# Patient Record
Sex: MALE | Race: OTHER | HISPANIC OR LATINO | URBAN - METROPOLITAN AREA
[De-identification: names, ages, dates, MRNs, and addresses within clinical notes are randomized per-mention and may not be internally consistent; named-entity substitution may affect disease eponyms.]

---

## 2016-12-23 RX ORDER — GENTAMICIN SULFATE 40 MG/ML
100 VIAL (ML) INJECTION
Qty: 0 | Refills: 0 | COMMUNITY
Start: 2016-12-23

## 2017-01-02 ENCOUNTER — EMERGENCY (EMERGENCY)
Facility: HOSPITAL | Age: 37
LOS: 1 days | Discharge: HOME | End: 2017-01-02
Attending: EMERGENCY MEDICINE | Admitting: EMERGENCY MEDICINE
Payer: MEDICARE

## 2017-01-02 VITALS
OXYGEN SATURATION: 97 % | TEMPERATURE: 99 F | RESPIRATION RATE: 18 BRPM | HEART RATE: 60 BPM | DIASTOLIC BLOOD PRESSURE: 88 MMHG | SYSTOLIC BLOOD PRESSURE: 208 MMHG

## 2017-01-02 VITALS
RESPIRATION RATE: 20 BRPM | OXYGEN SATURATION: 96 % | DIASTOLIC BLOOD PRESSURE: 76 MMHG | SYSTOLIC BLOOD PRESSURE: 179 MMHG | TEMPERATURE: 98 F | HEART RATE: 71 BPM

## 2017-01-02 DIAGNOSIS — E78.5 HYPERLIPIDEMIA, UNSPECIFIED: ICD-10-CM

## 2017-01-02 DIAGNOSIS — Z79.891 LONG TERM (CURRENT) USE OF OPIATE ANALGESIC: ICD-10-CM

## 2017-01-02 DIAGNOSIS — R06.02 SHORTNESS OF BREATH: ICD-10-CM

## 2017-01-02 DIAGNOSIS — N18.6 END STAGE RENAL DISEASE: ICD-10-CM

## 2017-01-02 DIAGNOSIS — I12.0 HYPERTENSIVE CHRONIC KIDNEY DISEASE WITH STAGE 5 CHRONIC KIDNEY DISEASE OR END STAGE RENAL DISEASE: ICD-10-CM

## 2017-01-02 DIAGNOSIS — Z91.013 ALLERGY TO SEAFOOD: ICD-10-CM

## 2017-01-02 DIAGNOSIS — Z88.5 ALLERGY STATUS TO NARCOTIC AGENT: ICD-10-CM

## 2017-01-02 DIAGNOSIS — I77.0 ARTERIOVENOUS FISTULA, ACQUIRED: ICD-10-CM

## 2017-01-02 DIAGNOSIS — Z88.8 ALLERGY STATUS TO OTHER DRUGS, MEDICAMENTS AND BIOLOGICAL SUBSTANCES STATUS: ICD-10-CM

## 2017-01-02 DIAGNOSIS — Z79.82 LONG TERM (CURRENT) USE OF ASPIRIN: ICD-10-CM

## 2017-01-02 DIAGNOSIS — Z98.89 OTHER SPECIFIED POSTPROCEDURAL STATES: Chronic | ICD-10-CM

## 2017-01-02 DIAGNOSIS — Z79.899 OTHER LONG TERM (CURRENT) DRUG THERAPY: ICD-10-CM

## 2017-01-02 LAB
ALBUMIN SERPL ELPH-MCNC: 2.8 G/DL — LOW (ref 3.4–5)
ALP SERPL-CCNC: 56 U/L — SIGNIFICANT CHANGE UP (ref 40–120)
ALT FLD-CCNC: 14 U/L — SIGNIFICANT CHANGE UP (ref 12–42)
ANION GAP SERPL CALC-SCNC: 10 MMOL/L — SIGNIFICANT CHANGE UP (ref 9–16)
APTT BLD: 55.8 SEC — HIGH (ref 27.5–37.4)
AST SERPL-CCNC: 23 U/L — SIGNIFICANT CHANGE UP (ref 15–37)
BASOPHILS NFR BLD AUTO: 0.9 % — SIGNIFICANT CHANGE UP (ref 0–2)
BILIRUB SERPL-MCNC: 1.3 MG/DL — HIGH (ref 0.2–1.2)
BUN SERPL-MCNC: 41 MG/DL — HIGH (ref 7–23)
CALCIUM SERPL-MCNC: 8.3 MG/DL — LOW (ref 8.5–10.5)
CHLORIDE SERPL-SCNC: 97 MMOL/L — SIGNIFICANT CHANGE UP (ref 96–108)
CK MB CFR SERPL CALC: <1 NG/ML — SIGNIFICANT CHANGE UP (ref 0.5–3.6)
CO2 SERPL-SCNC: 29 MMOL/L — SIGNIFICANT CHANGE UP (ref 22–31)
CREAT SERPL-MCNC: 9.49 MG/DL — HIGH (ref 0.5–1.3)
EOSINOPHIL NFR BLD AUTO: 8.5 % — HIGH (ref 0–6)
GLUCOSE SERPL-MCNC: 91 MG/DL — SIGNIFICANT CHANGE UP (ref 70–99)
HCT VFR BLD CALC: 28 % — LOW (ref 39–50)
HGB BLD-MCNC: 9.3 G/DL — LOW (ref 13–17)
INR BLD: 1.37 — HIGH (ref 0.88–1.16)
LYMPHOCYTES # BLD AUTO: 24.9 % — SIGNIFICANT CHANGE UP (ref 13–44)
MCHC RBC-ENTMCNC: 33.2 G/DL — SIGNIFICANT CHANGE UP (ref 32–36)
MCHC RBC-ENTMCNC: 33.6 PG — SIGNIFICANT CHANGE UP (ref 27–34)
MCV RBC AUTO: 101.1 FL — HIGH (ref 80–100)
MONOCYTES NFR BLD AUTO: 10.3 % — SIGNIFICANT CHANGE UP (ref 2–14)
NEUTROPHILS NFR BLD AUTO: 55.4 % — SIGNIFICANT CHANGE UP (ref 43–77)
NT-PROBNP SERPL-SCNC: HIGH PG/ML
PLATELET # BLD AUTO: 114 K/UL — LOW (ref 150–400)
POTASSIUM SERPL-MCNC: 4.1 MMOL/L — SIGNIFICANT CHANGE UP (ref 3.5–5.3)
POTASSIUM SERPL-SCNC: 4.1 MMOL/L — SIGNIFICANT CHANGE UP (ref 3.5–5.3)
PROT SERPL-MCNC: 7.9 G/DL — SIGNIFICANT CHANGE UP (ref 6.4–8.2)
PROTHROM AB SERPL-ACNC: 15.3 SEC — HIGH (ref 10–13.1)
RBC # BLD: 2.77 M/UL — LOW (ref 4.2–5.8)
RBC # FLD: 20.1 % — HIGH (ref 10.3–16.9)
SODIUM SERPL-SCNC: 136 MMOL/L — SIGNIFICANT CHANGE UP (ref 135–145)
TROPONIN I SERPL-MCNC: 0.06 NG/ML — HIGH (ref 0.01–0.04)
WBC # BLD: 3.3 K/UL — LOW (ref 3.8–10.5)
WBC # FLD AUTO: 3.3 K/UL — LOW (ref 3.8–10.5)

## 2017-01-02 PROCEDURE — 36415 COLL VENOUS BLD VENIPUNCTURE: CPT

## 2017-01-02 PROCEDURE — 85610 PROTHROMBIN TIME: CPT

## 2017-01-02 PROCEDURE — 85730 THROMBOPLASTIN TIME PARTIAL: CPT

## 2017-01-02 PROCEDURE — 93010 ELECTROCARDIOGRAM REPORT: CPT

## 2017-01-02 PROCEDURE — 85025 COMPLETE CBC W/AUTO DIFF WBC: CPT

## 2017-01-02 PROCEDURE — 87040 BLOOD CULTURE FOR BACTERIA: CPT

## 2017-01-02 PROCEDURE — 93005 ELECTROCARDIOGRAM TRACING: CPT | Mod: 77

## 2017-01-02 PROCEDURE — 80053 COMPREHEN METABOLIC PANEL: CPT

## 2017-01-02 PROCEDURE — 71010: CPT | Mod: 26

## 2017-01-02 PROCEDURE — 99284 EMERGENCY DEPT VISIT MOD MDM: CPT | Mod: 25

## 2017-01-02 PROCEDURE — 83880 ASSAY OF NATRIURETIC PEPTIDE: CPT

## 2017-01-02 PROCEDURE — 99285 EMERGENCY DEPT VISIT HI MDM: CPT | Mod: 25

## 2017-01-02 PROCEDURE — 82550 ASSAY OF CK (CPK): CPT

## 2017-01-02 PROCEDURE — 82553 CREATINE MB FRACTION: CPT

## 2017-01-02 PROCEDURE — 84484 ASSAY OF TROPONIN QUANT: CPT

## 2017-01-02 PROCEDURE — 71045 X-RAY EXAM CHEST 1 VIEW: CPT

## 2017-01-02 RX ORDER — SODIUM CHLORIDE 9 MG/ML
3 INJECTION INTRAMUSCULAR; INTRAVENOUS; SUBCUTANEOUS ONCE
Qty: 0 | Refills: 0 | Status: COMPLETED | OUTPATIENT
Start: 2017-01-02 | End: 2017-01-02

## 2017-01-02 RX ADMIN — SODIUM CHLORIDE 3 MILLILITER(S): 9 INJECTION INTRAMUSCULAR; INTRAVENOUS; SUBCUTANEOUS at 02:00

## 2017-01-02 NOTE — ED PROVIDER NOTE - CARE PLAN
Principal Discharge DX:	AV fistula  Secondary Diagnosis:	Chronic renal disease  Secondary Diagnosis:	SOB (shortness of breath)

## 2017-01-02 NOTE — ED PROVIDER NOTE - CONDUCTED A DETAILED DISCUSSION WITH PATIENT AND/OR GUARDIAN REGARDING, MDM
need for outpatient follow-up/lab results/return to ED if symptoms worsen, persist or questions arise/radiology results

## 2017-01-02 NOTE — ED PROVIDER NOTE - PSYCHIATRIC, MLM
Alert and oriented to person, place, time/situation. pt appears under influence of drugs - sleeping comfortable, when wakes up demanding narcotics

## 2017-01-02 NOTE — ED PROVIDER NOTE - ATTENDING CONTRIBUTION TO CARE
37 y/o M, BIBA c/o chronic SOB and L fistula infection. Pt had AV fistula revision on 12/23 by vascular and claims that for the past few days he has seen some redness and purulent dc. ?fevers. Also c/o chronic pain and has taken percocet for it but is asking for dilaudid now. Pt is AAO, NAD. RRR, CTA b/l, AV fistula site C/D/I.. Labs noted. Vascular consulted and saw pt in ED with no acute issues. Pt to be discharged home with outpt f/up.

## 2017-01-02 NOTE — ED ADULT NURSE NOTE - OBJECTIVE STATEMENT
Pt c/o if sob since yesterday. C/o chronic chest pain near old surgical site with breathing. Pt states his pain med doesn't give him any relief. Pt states he is due for Dialysis tomorrow and goes Monday, Wednesday, and Friday. O2 sat on RA is 97%. Pt has a left arm dialysis shunt and Right arm single lumen PICC line.

## 2017-01-02 NOTE — ED PROVIDER NOTE - MEDICAL DECISION MAKING DETAILS
pt claiming fistula inf -- dressing removed and no pus, no objective signs of inf - no pus/swelling/warmth, + sutures in place from recent revised fistula, seen by vasc and agree no acute inf; also claiming chronic sob and pain - pt appears to be under influence of drugs and admits to taking narcotics - requesting further pain meds - offered tyl or motrin which he refused, is in NAD. ekg unchanged from prior, labs baseline for pt (trop is less then pt's baseline - always high risk 2/2 to kidney failure and bnp elevated but in line w/prior value), no fluid overload on exam and cxr w/o failure, was hypertensive but bp decreased w/o meds and pt asymptomatic, no indication for admission at this time for all values stable and pt's baseline, behavior highly concerning for drug seeking, discussed w/pt's renal md and advised to d/c for outpatient dialysis, pt angry about not getting pain meds but not objecting to d/c plan

## 2017-01-02 NOTE — ED PROVIDER NOTE - MUSCULOSKELETAL, MLM
L UE: + av fistula in place w/revised fistula below it w/sutures in place, + thrill, no erythema, no pus, no bleeding, no tend, soft compartments

## 2017-01-02 NOTE — ED PROVIDER NOTE - PROGRESS NOTE DETAILS
spoke to dr kovacs who is covering for dr ledbetter -- all labs / findings discussed, and pt deemed stable to d/c for outpatient dialysis in am, f/u w/office; fistula jacquelyn'd by vascular - to f/u in clinic for suture removal and further f/u - agree that no inf

## 2017-01-02 NOTE — ED ADULT TRIAGE NOTE - CHIEF COMPLAINT QUOTE
pt BIBA coming from home ,is a dialysis pt , last had dialysis on Friday , pt c/o SOB since yesterday had chest yesterday but not today , pt received radioactive isotope GA67 2 weeks ago , [employer] sensor In the ambulance went off pt BIBA coming from home ,is a dialysis pt , last had dialysis on Friday , pt c/o SOB since yesterday had chest yesterday but not today , pt received radioactive isotope GA67 2 weeks ago , [employer] sensor In the ambulance went off, left AV fistula look infected

## 2017-01-02 NOTE — ED ADULT NURSE NOTE - CHIEF COMPLAINT QUOTE
pt BIBA coming from home ,is a dialysis pt , last had dialysis on Friday , pt c/o SOB since yesterday had chest yesterday but not today , pt received radioactive isotope GA67 2 weeks ago , [employer] sensor In the ambulance went off, left AV fistula look infected
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
## 2017-01-02 NOTE — ED PROVIDER NOTE - OBJECTIVE STATEMENT
The pt is a 37 y/o M, BIBA, c/o L fistula infection - had av fistula revised on 12/23 by dr macedo, claims that it's infected for few d. Claims purulent d/c and redness, also reports chronic sob - no acute changes today, HD on Mon - Wed - Fri, and demanding narcotics for his "pain" -- states chronic pain to L side of body, admits to taking percocet PTA. Denies fever, cp, cough, leg swelling, dizziness, syncope, n/v/d, abd pain

## 2017-01-07 LAB
CULTURE RESULTS: SIGNIFICANT CHANGE UP
CULTURE RESULTS: SIGNIFICANT CHANGE UP
SPECIMEN SOURCE: SIGNIFICANT CHANGE UP
SPECIMEN SOURCE: SIGNIFICANT CHANGE UP

## 2017-01-09 ENCOUNTER — RESULT REVIEW (OUTPATIENT)
Age: 37
End: 2017-01-09

## 2017-01-09 PROBLEM — Z09 SURGICAL FOLLOW-UP CARE: Status: ACTIVE | Noted: 2017-01-09

## 2017-01-09 PROBLEM — A49.01 MSSA (METHICILLIN SUSCEPTIBLE STAPHYLOCOCCUS AUREUS) INFECTION: Status: ACTIVE | Noted: 2017-01-09

## 2017-01-09 PROBLEM — Z98.890 S/P AORTIC DISSECTION REPAIR: Status: ACTIVE | Noted: 2017-01-09

## 2017-01-10 ENCOUNTER — APPOINTMENT (OUTPATIENT)
Dept: VASCULAR SURGERY | Facility: CLINIC | Age: 37
End: 2017-01-10

## 2017-01-10 ENCOUNTER — INPATIENT (INPATIENT)
Facility: HOSPITAL | Age: 37
LOS: 3 days | Discharge: HOME | DRG: 252 | End: 2017-01-14
Attending: SURGERY | Admitting: SURGERY
Payer: MEDICARE

## 2017-01-10 VITALS
OXYGEN SATURATION: 100 % | RESPIRATION RATE: 16 BRPM | SYSTOLIC BLOOD PRESSURE: 179 MMHG | DIASTOLIC BLOOD PRESSURE: 82 MMHG | TEMPERATURE: 98 F | HEIGHT: 71 IN | HEART RATE: 60 BPM | WEIGHT: 192.9 LBS
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
## 2017-01-10 DIAGNOSIS — N18.9 CHRONIC KIDNEY DISEASE, UNSPECIFIED: ICD-10-CM
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
## 2017-01-10 DIAGNOSIS — Z98.89 OTHER SPECIFIED POSTPROCEDURAL STATES: Chronic | ICD-10-CM

## 2017-01-10 LAB
ALBUMIN SERPL ELPH-MCNC: 2.9 G/DL — LOW (ref 3.4–5)
ALP SERPL-CCNC: 50 U/L — SIGNIFICANT CHANGE UP (ref 40–120)
ALT FLD-CCNC: 13 U/L — SIGNIFICANT CHANGE UP (ref 12–42)
ANION GAP SERPL CALC-SCNC: 10 MMOL/L — SIGNIFICANT CHANGE UP (ref 9–16)
APTT BLD: 54.9 SEC — HIGH (ref 27.5–37.4)
AST SERPL-CCNC: 21 U/L — SIGNIFICANT CHANGE UP (ref 15–37)
BASOPHILS NFR BLD AUTO: 1 % — SIGNIFICANT CHANGE UP (ref 0–2)
BILIRUB SERPL-MCNC: 1.2 MG/DL — SIGNIFICANT CHANGE UP (ref 0.2–1.2)
BLD GP AB SCN SERPL QL: NEGATIVE — SIGNIFICANT CHANGE UP
BUN SERPL-MCNC: 29 MG/DL — HIGH (ref 7–23)
CALCIUM SERPL-MCNC: 8.6 MG/DL — SIGNIFICANT CHANGE UP (ref 8.5–10.5)
CHLORIDE SERPL-SCNC: 100 MMOL/L — SIGNIFICANT CHANGE UP (ref 96–108)
CO2 SERPL-SCNC: 29 MMOL/L — SIGNIFICANT CHANGE UP (ref 22–31)
CREAT SERPL-MCNC: 6.6 MG/DL — HIGH (ref 0.5–1.3)
EOSINOPHIL NFR BLD AUTO: 6.1 % — HIGH (ref 0–6)
GLUCOSE SERPL-MCNC: 95 MG/DL — SIGNIFICANT CHANGE UP (ref 70–99)
HCT VFR BLD CALC: 28.9 % — LOW (ref 39–50)
HGB BLD-MCNC: 9.5 G/DL — LOW (ref 13–17)
INR BLD: 1.39 — HIGH (ref 0.88–1.16)
LYMPHOCYTES # BLD AUTO: 19.7 % — SIGNIFICANT CHANGE UP (ref 13–44)
MAGNESIUM SERPL-MCNC: 2.2 MG/DL — SIGNIFICANT CHANGE UP (ref 1.6–2.4)
MCHC RBC-ENTMCNC: 32.9 G/DL — SIGNIFICANT CHANGE UP (ref 32–36)
MCHC RBC-ENTMCNC: 33.5 PG — SIGNIFICANT CHANGE UP (ref 27–34)
MCV RBC AUTO: 101.8 FL — HIGH (ref 80–100)
MONOCYTES NFR BLD AUTO: 14.8 % — HIGH (ref 2–14)
NEUTROPHILS NFR BLD AUTO: 58.4 % — SIGNIFICANT CHANGE UP (ref 43–77)
NT-PROBNP SERPL-SCNC: HIGH PG/ML
PHOSPHATE SERPL-MCNC: 4.9 MG/DL — HIGH (ref 2.5–4.5)
PLATELET # BLD AUTO: 108 K/UL — LOW (ref 150–400)
POTASSIUM SERPL-MCNC: 4 MMOL/L — SIGNIFICANT CHANGE UP (ref 3.5–5.3)
POTASSIUM SERPL-SCNC: 4 MMOL/L — SIGNIFICANT CHANGE UP (ref 3.5–5.3)
PROT SERPL-MCNC: 7.4 G/DL — SIGNIFICANT CHANGE UP (ref 6.4–8.2)
PROTHROM AB SERPL-ACNC: 15.5 SEC — HIGH (ref 10–13.1)
RBC # BLD: 2.84 M/UL — LOW (ref 4.2–5.8)
RBC # FLD: 16.9 % — SIGNIFICANT CHANGE UP (ref 10.3–16.9)
RH IG SCN BLD-IMP: POSITIVE — SIGNIFICANT CHANGE UP
SODIUM SERPL-SCNC: 139 MMOL/L — SIGNIFICANT CHANGE UP (ref 135–145)
WBC # BLD: 3.1 K/UL — LOW (ref 3.8–10.5)
WBC # FLD AUTO: 3.1 K/UL — LOW (ref 3.8–10.5)

## 2017-01-10 PROCEDURE — 71010: CPT | Mod: 26

## 2017-01-10 PROCEDURE — 99285 EMERGENCY DEPT VISIT HI MDM: CPT | Mod: 25

## 2017-01-10 PROCEDURE — 93010 ELECTROCARDIOGRAM REPORT: CPT

## 2017-01-10 PROCEDURE — 99222 1ST HOSP IP/OBS MODERATE 55: CPT | Mod: 57

## 2017-01-10 RX ORDER — LOSARTAN POTASSIUM 100 MG/1
50 TABLET, FILM COATED ORAL DAILY
Qty: 0 | Refills: 0 | Status: DISCONTINUED | OUTPATIENT
Start: 2017-01-10 | End: 2017-01-11

## 2017-01-10 RX ORDER — GABAPENTIN 400 MG/1
300 CAPSULE ORAL DAILY
Qty: 0 | Refills: 0 | Status: DISCONTINUED | OUTPATIENT
Start: 2017-01-10 | End: 2017-01-11

## 2017-01-10 RX ORDER — DOCUSATE SODIUM 100 MG
100 CAPSULE ORAL DAILY
Qty: 0 | Refills: 0 | Status: DISCONTINUED | OUTPATIENT
Start: 2017-01-10 | End: 2017-01-11

## 2017-01-10 RX ORDER — CARVEDILOL PHOSPHATE 80 MG/1
12.5 CAPSULE, EXTENDED RELEASE ORAL EVERY 12 HOURS
Qty: 0 | Refills: 0 | Status: DISCONTINUED | OUTPATIENT
Start: 2017-01-10 | End: 2017-01-11

## 2017-01-10 RX ORDER — HYDROMORPHONE HYDROCHLORIDE 2 MG/ML
0.5 INJECTION INTRAMUSCULAR; INTRAVENOUS; SUBCUTANEOUS ONCE
Qty: 0 | Refills: 0 | Status: DISCONTINUED | OUTPATIENT
Start: 2017-01-10 | End: 2017-01-10

## 2017-01-10 RX ORDER — ATORVASTATIN CALCIUM 80 MG/1
10 TABLET, FILM COATED ORAL AT BEDTIME
Qty: 0 | Refills: 0 | Status: DISCONTINUED | OUTPATIENT
Start: 2017-01-10 | End: 2017-01-11

## 2017-01-10 RX ORDER — HYDRALAZINE HCL 50 MG
25 TABLET ORAL DAILY
Qty: 0 | Refills: 0 | Status: DISCONTINUED | OUTPATIENT
Start: 2017-01-10 | End: 2017-01-11

## 2017-01-10 RX ORDER — ASPIRIN/CALCIUM CARB/MAGNESIUM 324 MG
81 TABLET ORAL DAILY
Qty: 0 | Refills: 0 | Status: DISCONTINUED | OUTPATIENT
Start: 2017-01-10 | End: 2017-01-11

## 2017-01-10 RX ORDER — AMLODIPINE BESYLATE 2.5 MG/1
10 TABLET ORAL DAILY
Qty: 0 | Refills: 0 | Status: DISCONTINUED | OUTPATIENT
Start: 2017-01-10 | End: 2017-01-11

## 2017-01-10 RX ORDER — SENNA PLUS 8.6 MG/1
1 TABLET ORAL DAILY
Qty: 0 | Refills: 0 | Status: DISCONTINUED | OUTPATIENT
Start: 2017-01-10 | End: 2017-01-11

## 2017-01-10 RX ORDER — SEVELAMER CARBONATE 2400 MG/1
1600 POWDER, FOR SUSPENSION ORAL
Qty: 0 | Refills: 0 | Status: DISCONTINUED | OUTPATIENT
Start: 2017-01-10 | End: 2017-01-11

## 2017-01-10 RX ADMIN — ATORVASTATIN CALCIUM 10 MILLIGRAM(S): 80 TABLET, FILM COATED ORAL at 23:26

## 2017-01-10 RX ADMIN — HYDROMORPHONE HYDROCHLORIDE 0.5 MILLIGRAM(S): 2 INJECTION INTRAMUSCULAR; INTRAVENOUS; SUBCUTANEOUS at 22:23

## 2017-01-10 RX ADMIN — HYDROMORPHONE HYDROCHLORIDE 0.5 MILLIGRAM(S): 2 INJECTION INTRAMUSCULAR; INTRAVENOUS; SUBCUTANEOUS at 22:08

## 2017-01-10 RX ADMIN — Medication 0.3 MILLIGRAM(S): at 22:09

## 2017-01-10 RX ADMIN — HYDROMORPHONE HYDROCHLORIDE 0.5 MILLIGRAM(S): 2 INJECTION INTRAMUSCULAR; INTRAVENOUS; SUBCUTANEOUS at 16:36

## 2017-01-10 RX ADMIN — HYDROMORPHONE HYDROCHLORIDE 0.5 MILLIGRAM(S): 2 INJECTION INTRAMUSCULAR; INTRAVENOUS; SUBCUTANEOUS at 18:21

## 2017-01-10 RX ADMIN — HYDROMORPHONE HYDROCHLORIDE 0.5 MILLIGRAM(S): 2 INJECTION INTRAMUSCULAR; INTRAVENOUS; SUBCUTANEOUS at 17:43

## 2017-01-10 NOTE — H&P ADULT. - HISTORY OF PRESENT ILLNESS
Pt is a 36M with ESRD on HD (M/W/F - last dialyzed yesterday), malignant HTN, s/p Type A dissection repair with Dr. Moreno in 2015, pericardial window, sternal wound reconstruction with pec flap, and type B dissection repair at [facility] in 04/2016, recent admission in December 2016 for malfunctioning Left AVF, s/p AVF revision 12/22/16 who was sent in from Dr. Riley's office today with Left 1st to 3rd finger numbness and pain at the LUE AVF sight.  Pt states that LUE pain and numbness has been present for about one month.  Pt was diagnosed with LUE steal syndrome at the office and sent to ED for admission and pre-op for LUE AVF ligation. Pt currently has perm-cath that was place during his last admission.  Pt states that LUE "falls asleep after inactivity" denies chest pain, SOB, fever or chills. Pt is a 36M with ESRD on HD (M/W/F - last dialyzed yesterday), malignant HTN, s/p Type A dissection repair with Dr. Moreno in 2015, pericardial window, sternal wound reconstruction with pec flap, and type B dissection repair at [facility] in 04/2016, recent admission in December 2016 for malfunctioning Left AVF, s/p AVF revision 12/22/16 who was sent in from Dr. Riley's office today with Left 1st to 3rd finger numbness and pain at the LUE AVF sight.  Pt states that LUE pain and numbness has been present for about one month.  Pt was diagnosed with LUE steal syndrome at the office and sent to ED for admission and pre-op for LUE AVF ligation. Pt currently has perm-cath that was place during his last admission.  Pt states that LUE "falls asleep after inactivity" denies chest pain, SOB, dizziness, fever or chills.

## 2017-01-10 NOTE — ED PROVIDER NOTE - OBJECTIVE STATEMENT
36M with ESRD on HD (M/W/F - last dialyzed yday), malignant HTN, s/p Type A dissection repair with Dr. Moreno in 2015, pericardial window, sternal wound reconstruction with pec flap, and type B dissection repair at [facility] in 04/2016, recent admission in December 2016 for malfunctioning LAVF, placement of HD catheter placed who was sent in from Dr. Riley's office today for intractable pain, steal syndrome in fistula, and need for LAVF repair again. Pt complains of 10/10 pain over thoracotomy scar, and numbness to left arm and first 3 fingers x 1 month.  Denies SOb, fevers, or chills.

## 2017-01-10 NOTE — ED PROVIDER NOTE - MEDICAL DECISION MAKING DETAILS
37M with above PMHx sent in for admission to vascular for LAVF repair and pain control. Will give Dilaudid for pain (pt reports no allergy to Dilaudid) and check preop labs. Pt HTN've but otherwise stable VS, no resp distress, pt received full dialysis yesterday.

## 2017-01-10 NOTE — ED PROVIDER NOTE - MUSCULOSKELETAL, MLM
Spine appears normal, range of motion is not limited, no muscle or joint tenderness. LUE AV fistula with +thrill. R chest dialysis cath c/d/i.

## 2017-01-10 NOTE — H&P ADULT. - ASSESSMENT
38 y/o M with PMHx malignant HTN, s/p Type A dissection repair, ESRD on HD (M/W/F - last dialyzed yesterday) with LUE AVF now with LUE steal syndrome

## 2017-01-10 NOTE — ED ADULT NURSE NOTE - OBJECTIVE STATEMENT
37 year old male patient sent in from PMD for admission to [facility] fistula repair and pain management.  He states his fistula was recently repaired, stitches noted, but he is experiencing pain to L arm and numbness to his 1st 2nd and 3rd digit.  Positive bruit noted to fistula, last dialysis yesterday. PICC noted to R upper arm, flushing well with good blood return, states placed 1 month ago.  NO distress noted.  Breathing easily and unlabored.  Denies n/v/d/f, chills.

## 2017-01-10 NOTE — ED ADULT TRIAGE NOTE - CHIEF COMPLAINT QUOTE
sent in by dr. higgins s/p avf revision of uppper left arm requires admission for HD and AVF ligation pain management

## 2017-01-10 NOTE — H&P ADULT. - EXTREMITIES COMMENTS
LUE AVF with palpable thrill and sutures in place at the surgical site, AVF site with pseudoaneurysm, LUE with palpable radial and ulna pulse and warm hand, no sensation left 1st to 3rd fingers.

## 2017-01-11 ENCOUNTER — TRANSCRIPTION ENCOUNTER (OUTPATIENT)
Age: 37
End: 2017-01-11

## 2017-01-11 ENCOUNTER — APPOINTMENT (OUTPATIENT)
Dept: CARDIOTHORACIC SURGERY | Facility: CLINIC | Age: 37
End: 2017-01-11

## 2017-01-11 DIAGNOSIS — N18.6 END STAGE RENAL DISEASE: ICD-10-CM

## 2017-01-11 DIAGNOSIS — E78.5 HYPERLIPIDEMIA, UNSPECIFIED: ICD-10-CM

## 2017-01-11 DIAGNOSIS — T82.590A OTHER MECHANICAL COMPLICATION OF SURGICALLY CREATED ARTERIOVENOUS FISTULA, INITIAL ENCOUNTER: ICD-10-CM

## 2017-01-11 DIAGNOSIS — A49.01 METHICILLIN SUSCEPTIBLE STAPHYLOCOCCUS AUREUS INFECTION, UNSPECIFIED SITE: ICD-10-CM

## 2017-01-11 DIAGNOSIS — Z98.890 OTHER SPECIFIED POSTPROCEDURAL STATES: ICD-10-CM

## 2017-01-11 DIAGNOSIS — I71.9 AORTIC ANEURYSM OF UNSPECIFIED SITE, W/OUT RUPTURE: ICD-10-CM

## 2017-01-11 DIAGNOSIS — T82.898A OTHER SPECIFIED COMPLICATION OF VASCULAR PROSTHETIC DEVICES, IMPLANTS AND GRAFTS, INITIAL ENCOUNTER: ICD-10-CM

## 2017-01-11 DIAGNOSIS — Z09 ENCOUNTER FOR FOLLOW-UP EXAMINATION AFTER COMPLETED TREATMENT FOR CONDITIONS OTHER THAN MALIGNANT NEOPLASM: ICD-10-CM

## 2017-01-11 DIAGNOSIS — I10 ESSENTIAL (PRIMARY) HYPERTENSION: ICD-10-CM

## 2017-01-11 DIAGNOSIS — R20.8 OTHER DISTURBANCES OF SKIN SENSATION: ICD-10-CM

## 2017-01-11 LAB
ANION GAP SERPL CALC-SCNC: 10 MMOL/L — SIGNIFICANT CHANGE UP (ref 9–16)
APTT BLD: 52.4 SEC — HIGH (ref 27.5–37.4)
BUN SERPL-MCNC: 35 MG/DL — HIGH (ref 7–23)
CALCIUM SERPL-MCNC: 7.9 MG/DL — LOW (ref 8.5–10.5)
CHLORIDE SERPL-SCNC: 98 MMOL/L — SIGNIFICANT CHANGE UP (ref 96–108)
CO2 SERPL-SCNC: 29 MMOL/L — SIGNIFICANT CHANGE UP (ref 22–31)
CREAT SERPL-MCNC: 7.63 MG/DL — HIGH (ref 0.5–1.3)
GLUCOSE SERPL-MCNC: 88 MG/DL — SIGNIFICANT CHANGE UP (ref 70–99)
HBV SURFACE AG SER-ACNC: SIGNIFICANT CHANGE UP
HCT VFR BLD CALC: 28.1 % — LOW (ref 39–50)
HCV AB S/CO SERPL IA: 0.15 S/CO — SIGNIFICANT CHANGE UP
HCV AB SERPL-IMP: SIGNIFICANT CHANGE UP
HGB BLD-MCNC: 9.1 G/DL — LOW (ref 13–17)
INR BLD: 1.44 — HIGH (ref 0.88–1.16)
MAGNESIUM SERPL-MCNC: 2.2 MG/DL — SIGNIFICANT CHANGE UP (ref 1.6–2.4)
MCHC RBC-ENTMCNC: 32.4 G/DL — SIGNIFICANT CHANGE UP (ref 32–36)
MCHC RBC-ENTMCNC: 33.1 PG — SIGNIFICANT CHANGE UP (ref 27–34)
MCV RBC AUTO: 102.2 FL — HIGH (ref 80–100)
PHOSPHATE SERPL-MCNC: 5.5 MG/DL — HIGH (ref 2.5–4.5)
PLATELET # BLD AUTO: 105 K/UL — LOW (ref 150–400)
POTASSIUM SERPL-MCNC: 4.1 MMOL/L — SIGNIFICANT CHANGE UP (ref 3.5–5.3)
POTASSIUM SERPL-SCNC: 4.1 MMOL/L — SIGNIFICANT CHANGE UP (ref 3.5–5.3)
PROTHROM AB SERPL-ACNC: 16 SEC — HIGH (ref 10–13.1)
RBC # BLD: 2.75 M/UL — LOW (ref 4.2–5.8)
RBC # FLD: 16.9 % — SIGNIFICANT CHANGE UP (ref 10.3–16.9)
SODIUM SERPL-SCNC: 137 MMOL/L — SIGNIFICANT CHANGE UP (ref 135–145)
WBC # BLD: 3.2 K/UL — LOW (ref 3.8–10.5)
WBC # FLD AUTO: 3.2 K/UL — LOW (ref 3.8–10.5)

## 2017-01-11 PROCEDURE — 37607 LIG/BANDING ANGIOACS AV FSTL: CPT | Mod: GC

## 2017-01-11 RX ORDER — HYDRALAZINE HCL 50 MG
25 TABLET ORAL DAILY
Qty: 0 | Refills: 0 | Status: DISCONTINUED | OUTPATIENT
Start: 2017-01-11 | End: 2017-01-13

## 2017-01-11 RX ORDER — GENTAMICIN SULFATE 40 MG/ML
100 VIAL (ML) INJECTION ONCE
Qty: 0 | Refills: 0 | Status: COMPLETED | OUTPATIENT
Start: 2017-01-11 | End: 2017-01-12

## 2017-01-11 RX ORDER — HYDROMORPHONE HYDROCHLORIDE 2 MG/ML
1.5 INJECTION INTRAMUSCULAR; INTRAVENOUS; SUBCUTANEOUS ONCE
Qty: 0 | Refills: 0 | Status: DISCONTINUED | OUTPATIENT
Start: 2017-01-11 | End: 2017-01-11

## 2017-01-11 RX ORDER — HEPARIN SODIUM 5000 [USP'U]/ML
5000 INJECTION INTRAVENOUS; SUBCUTANEOUS EVERY 8 HOURS
Qty: 0 | Refills: 0 | Status: DISCONTINUED | OUTPATIENT
Start: 2017-01-13 | End: 2017-01-14

## 2017-01-11 RX ORDER — GABAPENTIN 400 MG/1
300 CAPSULE ORAL DAILY
Qty: 0 | Refills: 0 | Status: DISCONTINUED | OUTPATIENT
Start: 2017-01-11 | End: 2017-01-14

## 2017-01-11 RX ORDER — HYDROMORPHONE HYDROCHLORIDE 2 MG/ML
0.5 INJECTION INTRAMUSCULAR; INTRAVENOUS; SUBCUTANEOUS EVERY 4 HOURS
Qty: 0 | Refills: 0 | Status: DISCONTINUED | OUTPATIENT
Start: 2017-01-11 | End: 2017-01-11

## 2017-01-11 RX ORDER — DOCUSATE SODIUM 100 MG
100 CAPSULE ORAL DAILY
Qty: 0 | Refills: 0 | Status: DISCONTINUED | OUTPATIENT
Start: 2017-01-11 | End: 2017-01-14

## 2017-01-11 RX ORDER — OXYCODONE HYDROCHLORIDE 5 MG/1
5 TABLET ORAL EVERY 4 HOURS
Qty: 0 | Refills: 0 | Status: DISCONTINUED | OUTPATIENT
Start: 2017-01-11 | End: 2017-01-13

## 2017-01-11 RX ORDER — OXACILLIN SODIUM 2 G
2 INTRAVENOUS SOLUTION, PIGGYBACK (EA) INTRAVENOUS ONCE
Qty: 0 | Refills: 0 | Status: COMPLETED | OUTPATIENT
Start: 2017-01-11 | End: 2017-01-11

## 2017-01-11 RX ORDER — HYDROMORPHONE HYDROCHLORIDE 2 MG/ML
1 INJECTION INTRAMUSCULAR; INTRAVENOUS; SUBCUTANEOUS ONCE
Qty: 0 | Refills: 0 | Status: DISCONTINUED | OUTPATIENT
Start: 2017-01-11 | End: 2017-01-11

## 2017-01-11 RX ORDER — LOSARTAN POTASSIUM 100 MG/1
50 TABLET, FILM COATED ORAL DAILY
Qty: 0 | Refills: 0 | Status: DISCONTINUED | OUTPATIENT
Start: 2017-01-11 | End: 2017-01-14

## 2017-01-11 RX ORDER — SEVELAMER CARBONATE 2400 MG/1
1600 POWDER, FOR SUSPENSION ORAL
Qty: 0 | Refills: 0 | Status: DISCONTINUED | OUTPATIENT
Start: 2017-01-11 | End: 2017-01-14

## 2017-01-11 RX ORDER — OXACILLIN SODIUM 2 G
2 INTRAVENOUS SOLUTION, PIGGYBACK (EA) INTRAVENOUS EVERY 4 HOURS
Qty: 0 | Refills: 0 | Status: DISCONTINUED | OUTPATIENT
Start: 2017-01-11 | End: 2017-01-14

## 2017-01-11 RX ORDER — HYDROMORPHONE HYDROCHLORIDE 2 MG/ML
0.5 INJECTION INTRAMUSCULAR; INTRAVENOUS; SUBCUTANEOUS ONCE
Qty: 0 | Refills: 0 | Status: DISCONTINUED | OUTPATIENT
Start: 2017-01-11 | End: 2017-01-11

## 2017-01-11 RX ORDER — OXYCODONE HYDROCHLORIDE 5 MG/1
10 TABLET ORAL EVERY 4 HOURS
Qty: 0 | Refills: 0 | Status: DISCONTINUED | OUTPATIENT
Start: 2017-01-11 | End: 2017-01-13

## 2017-01-11 RX ORDER — FAMOTIDINE 10 MG/ML
40 INJECTION INTRAVENOUS DAILY
Qty: 0 | Refills: 0 | Status: DISCONTINUED | OUTPATIENT
Start: 2017-01-11 | End: 2017-01-11

## 2017-01-11 RX ORDER — HYDROMORPHONE HYDROCHLORIDE 2 MG/ML
0.5 INJECTION INTRAMUSCULAR; INTRAVENOUS; SUBCUTANEOUS
Qty: 0 | Refills: 0 | Status: DISCONTINUED | OUTPATIENT
Start: 2017-01-11 | End: 2017-01-11

## 2017-01-11 RX ORDER — FAMOTIDINE 10 MG/ML
40 INJECTION INTRAVENOUS DAILY
Qty: 0 | Refills: 0 | Status: DISCONTINUED | OUTPATIENT
Start: 2017-01-11 | End: 2017-01-14

## 2017-01-11 RX ORDER — OXACILLIN SODIUM 2 G
INTRAVENOUS SOLUTION, PIGGYBACK (EA) INTRAVENOUS
Qty: 0 | Refills: 0 | Status: DISCONTINUED | OUTPATIENT
Start: 2017-01-11 | End: 2017-01-14

## 2017-01-11 RX ORDER — CARVEDILOL PHOSPHATE 80 MG/1
12.5 CAPSULE, EXTENDED RELEASE ORAL EVERY 12 HOURS
Qty: 0 | Refills: 0 | Status: DISCONTINUED | OUTPATIENT
Start: 2017-01-11 | End: 2017-01-14

## 2017-01-11 RX ORDER — HYDROMORPHONE HYDROCHLORIDE 2 MG/ML
1 INJECTION INTRAMUSCULAR; INTRAVENOUS; SUBCUTANEOUS EVERY 4 HOURS
Qty: 0 | Refills: 0 | Status: DISCONTINUED | OUTPATIENT
Start: 2017-01-11 | End: 2017-01-11

## 2017-01-11 RX ORDER — HYDROMORPHONE HYDROCHLORIDE 2 MG/ML
2 INJECTION INTRAMUSCULAR; INTRAVENOUS; SUBCUTANEOUS EVERY 4 HOURS
Qty: 0 | Refills: 0 | Status: DISCONTINUED | OUTPATIENT
Start: 2017-01-11 | End: 2017-01-13

## 2017-01-11 RX ORDER — HYDROMORPHONE HYDROCHLORIDE 2 MG/ML
0.5 INJECTION INTRAMUSCULAR; INTRAVENOUS; SUBCUTANEOUS ONCE
Qty: 0 | Refills: 0 | Status: DISCONTINUED | OUTPATIENT
Start: 2017-01-11 | End: 2017-01-13

## 2017-01-11 RX ORDER — ASPIRIN/CALCIUM CARB/MAGNESIUM 324 MG
81 TABLET ORAL DAILY
Qty: 0 | Refills: 0 | Status: DISCONTINUED | OUTPATIENT
Start: 2017-01-11 | End: 2017-01-14

## 2017-01-11 RX ORDER — AMLODIPINE BESYLATE 2.5 MG/1
10 TABLET ORAL DAILY
Qty: 0 | Refills: 0 | Status: DISCONTINUED | OUTPATIENT
Start: 2017-01-11 | End: 2017-01-14

## 2017-01-11 RX ORDER — SENNA PLUS 8.6 MG/1
1 TABLET ORAL DAILY
Qty: 0 | Refills: 0 | Status: DISCONTINUED | OUTPATIENT
Start: 2017-01-11 | End: 2017-01-14

## 2017-01-11 RX ORDER — ATORVASTATIN CALCIUM 80 MG/1
10 TABLET, FILM COATED ORAL AT BEDTIME
Qty: 0 | Refills: 0 | Status: DISCONTINUED | OUTPATIENT
Start: 2017-01-11 | End: 2017-01-14

## 2017-01-11 RX ADMIN — HYDROMORPHONE HYDROCHLORIDE 1.5 MILLIGRAM(S): 2 INJECTION INTRAMUSCULAR; INTRAVENOUS; SUBCUTANEOUS at 20:26

## 2017-01-11 RX ADMIN — Medication 100 GRAM(S): at 23:11

## 2017-01-11 RX ADMIN — Medication 25 MILLIGRAM(S): at 23:56

## 2017-01-11 RX ADMIN — HYDROMORPHONE HYDROCHLORIDE 2 MILLIGRAM(S): 2 INJECTION INTRAMUSCULAR; INTRAVENOUS; SUBCUTANEOUS at 23:11

## 2017-01-11 RX ADMIN — AMLODIPINE BESYLATE 10 MILLIGRAM(S): 2.5 TABLET ORAL at 05:16

## 2017-01-11 RX ADMIN — HYDROMORPHONE HYDROCHLORIDE 0.5 MILLIGRAM(S): 2 INJECTION INTRAMUSCULAR; INTRAVENOUS; SUBCUTANEOUS at 05:31

## 2017-01-11 RX ADMIN — HYDROMORPHONE HYDROCHLORIDE 0.5 MILLIGRAM(S): 2 INJECTION INTRAMUSCULAR; INTRAVENOUS; SUBCUTANEOUS at 13:40

## 2017-01-11 RX ADMIN — HYDROMORPHONE HYDROCHLORIDE 1 MILLIGRAM(S): 2 INJECTION INTRAMUSCULAR; INTRAVENOUS; SUBCUTANEOUS at 10:03

## 2017-01-11 RX ADMIN — HYDROMORPHONE HYDROCHLORIDE 1 MILLIGRAM(S): 2 INJECTION INTRAMUSCULAR; INTRAVENOUS; SUBCUTANEOUS at 15:00

## 2017-01-11 RX ADMIN — Medication 25 MILLIGRAM(S): at 05:16

## 2017-01-11 RX ADMIN — HYDROMORPHONE HYDROCHLORIDE 0.5 MILLIGRAM(S): 2 INJECTION INTRAMUSCULAR; INTRAVENOUS; SUBCUTANEOUS at 13:20

## 2017-01-11 RX ADMIN — CARVEDILOL PHOSPHATE 12.5 MILLIGRAM(S): 80 CAPSULE, EXTENDED RELEASE ORAL at 20:50

## 2017-01-11 RX ADMIN — Medication 0.3 MILLIGRAM(S): at 05:16

## 2017-01-11 RX ADMIN — LOSARTAN POTASSIUM 50 MILLIGRAM(S): 100 TABLET, FILM COATED ORAL at 05:16

## 2017-01-11 RX ADMIN — CARVEDILOL PHOSPHATE 12.5 MILLIGRAM(S): 80 CAPSULE, EXTENDED RELEASE ORAL at 05:16

## 2017-01-11 RX ADMIN — HYDROMORPHONE HYDROCHLORIDE 2 MILLIGRAM(S): 2 INJECTION INTRAMUSCULAR; INTRAVENOUS; SUBCUTANEOUS at 23:25

## 2017-01-11 RX ADMIN — HYDROMORPHONE HYDROCHLORIDE 0.5 MILLIGRAM(S): 2 INJECTION INTRAMUSCULAR; INTRAVENOUS; SUBCUTANEOUS at 05:16

## 2017-01-11 RX ADMIN — Medication 0.3 MILLIGRAM(S): at 23:59

## 2017-01-11 RX ADMIN — HYDROMORPHONE HYDROCHLORIDE 0.5 MILLIGRAM(S): 2 INJECTION INTRAMUSCULAR; INTRAVENOUS; SUBCUTANEOUS at 20:23

## 2017-01-11 NOTE — DISCHARGE NOTE ADULT - CARE PROVIDERS DIRECT ADDRESSES
,[email or web address],[email or web address] ,[email or web address],[email or web address] ,[email or web address],DirectAddress_Unknown,DirectAddress_Unknown

## 2017-01-11 NOTE — PROGRESS NOTE ADULT - SUBJECTIVE AND OBJECTIVE BOX
Pre-op Diagnosis: ESRD  Procedure: LUE AVF ligation  Surgeon: Juan Jose    Consent in chart                          9.1    3.2   )-----------( 105      ( 11 Jan 2017 02:49 )             28.1     11 Jan 2017 02:49    137    |  98     |  35     ----------------------------<  88     4.1     |  29     |  7.63     Ca    7.9        11 Jan 2017 02:49  Phos  5.5       11 Jan 2017 02:49  Mg     2.2       11 Jan 2017 02:49    TPro  7.4    /  Alb  2.9    /  TBili  1.2    /  DBili  x      /  AST  21     /  ALT  13     /  AlkPhos  50     10 Chucho 2017 16:55    PT/INR - ( 11 Jan 2017 02:49 )   PT: 16.0 sec;   INR: 1.44     PTT - ( 11 Jan 2017 02:49 )  PTT:52.4 sec    Type & Screen: A+ Ab Neg  CXR: Mild pulmonary vascular prominence. No definite pleural effusion. No pneumothorax. No other change.  EKG: Normal sinus rhythm; Possible Left atrial enlargement; Left axis deviation; Pulmonary disease pattern; ST & T wave abnormality, consider lateral ischemia    A/P: 37yMale planned for above procedure  1. NPO   2. Pain control  3. Home medication  4. [x ] Blood on hold, Units: 2u PRBC

## 2017-01-11 NOTE — DISCHARGE NOTE ADULT - CARE PLAN
Principal Discharge DX:	Dialysis AV fistula malfunction  Goal:	see below  Instructions for follow-up, activity and diet:	see below  Secondary Diagnosis:	Aneurysm of thoracic aorta  Secondary Diagnosis:	Aortic aneurysm  Secondary Diagnosis:	End-stage renal disease on hemodialysis  Secondary Diagnosis:	Essential hypertension  Secondary Diagnosis:	Hyperlipidemia
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
## 2017-01-11 NOTE — DISCHARGE NOTE ADULT - CARE PROVIDER_API CALL
Anil Ingram), Surgery; Vascular Surgery  [address] [place] [address]  Phone: [phone]  Fax: [phone] Anil Ingram), Surgery; Vascular Surgery  [address] [place] [address]  Phone: [phone]  Fax: [phone]    Nakul Moreno (MD), Surgery; Thoracic and Cardiac Surgery  [address] [place] [address]  Phone: [phone]  Fax: [phone]    Israel Francisco), Critical Care Medicine; Infectious Disease; Internal Medicine  [address] [place] [address]  Phone: [phone]  Fax: [phone] Anil Ingram), Surgery; Vascular Surgery  [address] [place] [address]  Phone: [phone]  Fax: [phone]    Nakul Moreno), Surgery; Thoracic and Cardiac Surgery  [address] [place] [address]  Phone: [phone]  Fax: [phone]    Israel Francisco), Critical Care Medicine; Infectious Disease; Internal Medicine  [address] [place] [address]  Phone: [phone]  Fax: [phone]    Andi Marr), Anesthesiology [facility]  [address]  [place] [address]  Phone: [phone]  Fax: [phone]

## 2017-01-11 NOTE — DISCHARGE NOTE ADULT - HOSPITAL COURSE
Pt is a 36M with ESRD on HD (M/W/F - last dialyzed yesterday), malignant HTN, s/p Type A dissection repair with Dr. Moreno in 2015, pericardial window, sternal wound reconstruction with pec flap, and type B dissection repair at [facility] in 04/2016, recent admission in December 2016 for malfunctioning Left AVF, s/p AVF revision 12/22/16 who was sent in from Dr. Riley's office today with Left 1st to 3rd finger numbness and pain at the LUE AVF sight.  Pt states that LUE pain and numbness has been present for about one month.  Pt was diagnosed with LUE steal syndrome at the office and sent to ED for admission and pre-op for LUE AVF ligation. Pt currently has perm-cath that was place during his last admission.  Pt states that LUE "falls asleep after inactivity" denies chest pain, SOB, dizziness, fever or chills.  On 1/11/17, his AVfistula was ligated. He was then dialyzed through his permcath and discharged home when medically stable. Pt is a 36M with ESRD on HD (M/W/F - last dialyzed yesterday), malignant HTN, s/p Type A dissection repair with Dr. Moreno in 2015, pericardial window, sternal wound reconstruction with pec flap, and type B dissection repair at [facility] in 04/2016, recent admission in December 2016 for malfunctioning Left AVF, s/p AVF revision 12/22/16 who was sent in from Dr. Riley's office today with Left 1st to 3rd finger numbness and pain at the LUE AVF sight.  Pt states that LUE pain and numbness has been present for about one month.  Pt was diagnosed with LUE steal syndrome at the office and sent to ED for admission and pre-op for LUE AVF ligation. Pt currently has perm-cath that was place during his last admission.  Pt states that LUE "falls asleep after inactivity" denies chest pain, SOB, dizziness, fever or chills.  On 1/11/17, his AVfistula was ligated. He was then dialyzed through his permcath and discharged home when medically stable. His Home IV infusion was reinstated through [facility]. Dialysis was reinstated for Fri 1/13/17 at [facility]. Pt is a 36M with ESRD on HD (M/W/F - last dialyzed yesterday), malignant HTN, s/p Type A dissection repair with Dr. Moreno in 2015, pericardial window, sternal wound reconstruction with pec flap, and type B dissection repair at [facility] in 04/2016, recent admission in December 2016 for malfunctioning Left AVF, s/p AVF revision 12/22/16 who was sent in from Dr. Riley's office today with Left 1st to 3rd finger numbness and pain at the LUE AVF sight.  Pt states that LUE pain and numbness has been present for about one month.  Pt was diagnosed with LUE steal syndrome at the office and sent to ED for admission and pre-op for LUE AVF ligation. Pt currently has perm-cath that was place during his last admission.  Pt states that LUE "falls asleep after inactivity" denies chest pain, SOB, dizziness, fever or chills.  On 1/11/17, his AVfistula was ligated. He was then dialyzed through his permcath and discharged home when medically stable. His Home IV infusion was reinstated through [facility] for 1/13/17. Dialysis was reinstated for Mon 1/16/17 at [facility]. Pt is a 36M with ESRD on HD (M/W/F - last dialyzed yesterday), malignant HTN, s/p Type A dissection repair with Dr. Moreno in 2015, pericardial window, sternal wound reconstruction with pec flap, and type B dissection repair at [facility] in 04/2016, recent admission in December 2016 for malfunctioning Left AVF, s/p AVF revision 12/22/16 who was sent in from Dr. Riley's office today with Left 1st to 3rd finger numbness and pain at the LUE AVF sight.  Pt states that LUE pain and numbness has been present for about one month.  Pt was diagnosed with LUE steal syndrome at the office and sent to ED for admission and pre-op for LUE AVF ligation. Pt currently has perm-cath that was place during his last admission.  Pt states that LUE "falls asleep after inactivity" denies chest pain, SOB, dizziness, fever or chills.  On 1/11/17, his AVfistula was ligated. He was then dialyzed through his permcath and discharged home when medically stable. His Home IV infusion was reinstated through [facility] for 1/13/17. Dialysis had been reinstated for Mon 1/16/17 at [facility]. Just when patient was going to leave, his blood pressure went up. Hydralazine was given, but patient refused to leave. Pt is a 36M with ESRD on HD (M/W/F - last dialyzed yesterday), malignant HTN, s/p Type A dissection repair with Dr. Moreno in 2015, pericardial window, sternal wound reconstruction with pec flap, and type B dissection repair at [facility] in 04/2016, recent admission in December 2016 for malfunctioning Left AVF, s/p AVF revision 12/22/16 who was sent in from Dr. Riley's office today with Left 1st to 3rd finger numbness and pain at the LUE AVF sight.  Pt states that LUE pain and numbness has been present for about one month.  Pt was diagnosed with LUE steal syndrome at the office and sent to ED for admission and pre-op for LUE AVF ligation. Pt currently has perm-cath that was place during his last admission.  Pt states that LUE "falls asleep after inactivity" denies chest pain, SOB, dizziness, fever or chills.  On 1/11/17, his AVfistula was ligated. He was then dialyzed through his permcath and discharged home when medically stable. His Home IV infusion was reinstated through [facility] for 1/13/17. Dialysis had been reinstated for Mon 1/16/17 at [facility]. Just when patient was going to leave, his blood pressure went up. Hydralazine was given. On 1/14, he refused dialysis, but his blood pressure normalized, and he was discharged home in stable condition.

## 2017-01-11 NOTE — PROGRESS NOTE ADULT - SUBJECTIVE AND OBJECTIVE BOX
24Hr Events:  O/N: Admitted, NPO, pain control, pre-oped, added on to OR schedule    Assessment/Plan:  36 y/o M with PMHx malignant HTN, s/p Type A dissection repair, ESRD on HD (M/W/F - last dialyzed yesterday) with LUE AVF now with LUE steal syndrome    Pain control  NPO for LUE AVF ligation  Home medication  F/u AM labs.

## 2017-01-11 NOTE — DISCHARGE NOTE ADULT - ADDITIONAL INSTRUCTIONS
Follow up with Dr. Ingram in 1-2 weeks in office at [phone]. Dry gauze to incisions daily. Please call your doctor if you have a fever of over 101.9 or swelling/bleeding at incisions or puncture sites. Follow-up with your regular dialysis unit regarding your next session. You will be dialyzed through your permcath. Follow up with Dr. Ingram in 1-2 weeks in office at [phone]. Dry gauze to incisions daily. Please call your doctor if you have a fever of over 101.9 or swelling/bleeding at incisions or puncture sites. Follow-up with your regular dialysis unit regarding your next session. You will be dialyzed through your permcath. Follow-up with your Home IV infusion Team about finishing the present course of antibiotics via PICC line (Oxacillin). Follow-up with Pain Medicine for your pain needs Follow up with Dr. Ingram in 1-2 weeks in office at [phone]. Dry gauze to incisions daily. Please call your doctor if you have a fever of over 101.9 or swelling/bleeding at incisions or puncture sites. Follow-up with your regular dialysis unit regarding your next session. You will be dialyzed through your permcath. Follow-up with your Home IV infusion Team about finishing the present course of antibiotics via PICC line (Oxacillin). Follow-up with Pain Medicine for your pain needs. Follow-up with Corrine Moreno /Nolan re: PICC line antibiotics Follow up with Dr. Ingram in 1-2 weeks in office at [phone]. Dry gauze to incisions daily. Please call your doctor if you have a fever of over 101.9 or swelling/bleeding at incisions or puncture sites. Follow-up with your regular dialysis unit regarding your next session. You will be dialyzed through your permcath. Follow-up with your Home IV infusion Team about finishing the present course of antibiotics via PICC line (Oxacillin). Follow-up with Pain Medicine for your pain needs (Dr. Marr-phone # below) Follow-up with Corrine Moreno /Nolan re: PICC line antibiotics

## 2017-01-11 NOTE — DISCHARGE NOTE ADULT - MEDICATION SUMMARY - MEDICATIONS TO TAKE
I will START or STAY ON the medications listed below when I get home from the hospital:    gentamicin  -- 100 milligram(s) intravenous every 48 hours at dialysis  -- Indication: For Antibiotics    aspirin 81 mg oral delayed release tablet  -- 1 tab(s) by mouth once a day  -- Indication: For circulation    losartan 50 mg oral tablet  -- 2 tab(s) by mouth once a day  -- Do not take this drug if you are pregnant.  It is very important that you take or use this exactly as directed.  Do not skip doses or discontinue unless directed by your doctor.  Some non-prescription drugs may aggravate your condition.  Read all labels carefully.  If a warning appears, check with your doctor before taking.    -- Indication: For BP    Catapres 0.3 mg oral tablet  -- 1 tab(s) by mouth 3 times a day  -- It is very important that you take or use this exactly as directed.  Do not skip doses or discontinue unless directed by your doctor.  May cause drowsiness.  Alcohol may intensify this effect.  Use care when operating dangerous machinery.  Some non-prescription drugs may aggravate your condition.  Read all labels carefully.  If a warning appears, check with your doctor before taking.    -- Indication: For cardiac    doxazosin 2 mg oral tablet  -- 1 tab(s) by mouth once a day (at bedtime)  -- Indication: For cardiac    isosorbide mononitrate 60 mg oral tablet, extended release  -- 1 tab(s) by mouth once a day  -- Indication: For cardiac    gabapentin 300 mg oral capsule  -- 1 cap(s) by mouth once a day  -- It is very important that you take or use this exactly as directed.  Do not skip doses or discontinue unless directed by your doctor.  May cause drowsiness.  Alcohol may intensify this effect.  Use care when operating dangerous machinery.    -- Indication: For neuropathy    diphenhydrAMINE 50 mg oral capsule  -- 1 cap(s) by mouth once a day (at bedtime), As needed, Insomnia  -- Indication: For vertigo    atorvastatin 10 mg oral tablet  -- 1 tab(s) by mouth once a day (at bedtime)  -- Indication: For cholesterol    rifAMPin 300 mg oral capsule  -- 2 cap(s) by mouth once a day  -- Indication: For Anti infective    carvedilol 25 mg oral tablet  -- 2 tab(s) by mouth every 12 hours  -- Indication: For cardiac    amLODIPine 10 mg oral tablet  -- 1 tab(s) by mouth once a day  -- Indication: For BP    famotidine 20 mg oral tablet  -- 1 tab(s) by mouth once a day  -- Indication: For GI    docusate sodium 100 mg oral capsule  -- 1 cap(s) by mouth once a day  -- Indication: For constipation    polyethylene glycol 3350 oral powder for reconstitution  -- 17 gram(s) by mouth once a day, As needed, Constipation  -- Indication: For constipation    senna oral tablet  -- 2 tab(s) by mouth once a day (at bedtime)  -- Indication: For constipation    oxacillin 2 g/50 mL intravenous solution  -- 2 gram(s) intravenous every 4 hours  -- Indication: For Antibiotic    Renagel 800 mg oral tablet  -- 2 tab(s) by mouth 3 times a day  -- Indication: For phosphate    minoxidil 10 mg oral tablet  -- 1 tab(s) by mouth 3 times a day  -- Indication: For cardiac    hydrALAZINE 25 mg oral tablet  -- 4 tab(s) by mouth every 8 hours  -- It is very important that you take or use this exactly as directed.  Do not skip doses or discontinue unless directed by your doctor.  Some non-prescription drugs may aggravate your condition.  Read all labels carefully.  If a warning appears, check with your doctor before taking.    -- Indication: For cardiac I will START or STAY ON the medications listed below when I get home from the hospital:    gentamicin  -- 100 milligram(s) intravenous every 48 hours at dialysis  -- Indication: For Antibiotics    oxyCODONE 5 mg oral tablet  -- 1 tab(s) by mouth every 6 hours MDD:4 tabs  Patient has been on this while in hospital without any allergic problems  -- Caution federal law prohibits the transfer of this drug to any person other  than the person for whom it was prescribed.  It is very important that you take or use this exactly as directed.  Do not skip doses or discontinue unless directed by your doctor.  May cause drowsiness.  Alcohol may intensify this effect.  Use care when operating dangerous machinery.  This prescription cannot be refilled.  Using more of this medication than prescribed may cause serious breathing problems.    -- Indication: For pain-eprescribed    aspirin 81 mg oral delayed release tablet  -- 1 tab(s) by mouth once a day  -- Indication: For circulation    losartan 50 mg oral tablet  -- 2 tab(s) by mouth once a day  -- Do not take this drug if you are pregnant.  It is very important that you take or use this exactly as directed.  Do not skip doses or discontinue unless directed by your doctor.  Some non-prescription drugs may aggravate your condition.  Read all labels carefully.  If a warning appears, check with your doctor before taking.    -- Indication: For BP    Catapres 0.3 mg oral tablet  -- 1 tab(s) by mouth 3 times a day  -- It is very important that you take or use this exactly as directed.  Do not skip doses or discontinue unless directed by your doctor.  May cause drowsiness.  Alcohol may intensify this effect.  Use care when operating dangerous machinery.  Some non-prescription drugs may aggravate your condition.  Read all labels carefully.  If a warning appears, check with your doctor before taking.    -- Indication: For cardiac    doxazosin 2 mg oral tablet  -- 1 tab(s) by mouth once a day (at bedtime)  -- Indication: For cardiac    isosorbide mononitrate 60 mg oral tablet, extended release  -- 1 tab(s) by mouth once a day  -- Indication: For cardiac    gabapentin 300 mg oral capsule  -- 1 cap(s) by mouth once a day  -- It is very important that you take or use this exactly as directed.  Do not skip doses or discontinue unless directed by your doctor.  May cause drowsiness.  Alcohol may intensify this effect.  Use care when operating dangerous machinery.    -- Indication: For neuropathy    diphenhydrAMINE 50 mg oral capsule  -- 1 cap(s) by mouth once a day (at bedtime), As needed, Insomnia  -- Indication: For vertigo    atorvastatin 10 mg oral tablet  -- 1 tab(s) by mouth once a day (at bedtime)  -- Indication: For cholesterol    rifAMPin 300 mg oral capsule  -- 2 cap(s) by mouth once a day  -- Indication: For Anti infective    carvedilol 25 mg oral tablet  -- 2 tab(s) by mouth every 12 hours  -- Indication: For cardiac    amLODIPine 10 mg oral tablet  -- 1 tab(s) by mouth once a day  -- Indication: For BP    famotidine 20 mg oral tablet  -- 1 tab(s) by mouth once a day  -- Indication: For GI    docusate sodium 100 mg oral capsule  -- 1 cap(s) by mouth once a day  -- Indication: For constipation    polyethylene glycol 3350 oral powder for reconstitution  -- 17 gram(s) by mouth once a day, As needed, Constipation  -- Indication: For constipation    senna oral tablet  -- 2 tab(s) by mouth once a day (at bedtime)  -- Indication: For constipation    oxacillin 2 g/50 mL intravenous solution  -- 2 gram(s) intravenous every 4 hours  -- Indication: For Antibiotic    Renagel 800 mg oral tablet  -- 2 tab(s) by mouth 3 times a day  -- Indication: For phosphate    minoxidil 10 mg oral tablet  -- 1 tab(s) by mouth 3 times a day  -- Indication: For cardiac    hydrALAZINE 25 mg oral tablet  -- 4 tab(s) by mouth every 8 hours  -- It is very important that you take or use this exactly as directed.  Do not skip doses or discontinue unless directed by your doctor.  Some non-prescription drugs may aggravate your condition.  Read all labels carefully.  If a warning appears, check with your doctor before taking.    -- Indication: For cardiac I will START or STAY ON the medications listed below when I get home from the hospital:    gentamicin  -- 100 milligram(s) intravenous every 48 hours at dialysis  -- Indication: For Antibiotics    Percocet 5/325 oral tablet  -- 1 tab(s) by mouth every 6 hours, As Needed MDD:4 tabs  -- Caution federal law prohibits the transfer of this drug to any person other  than the person for whom it was prescribed.  May cause drowsiness.  Alcohol may intensify this effect.  Use care when operating dangerous machinery.  This prescription cannot be refilled.  This product contains acetaminophen.  Do not use  with any other product containing acetaminophen to prevent possible liver damage.  Using more of this medication than prescribed may cause serious breathing problems.    -- Indication: For pain    aspirin 81 mg oral delayed release tablet  -- 1 tab(s) by mouth once a day  -- Indication: For circulation    losartan 50 mg oral tablet  -- 2 tab(s) by mouth once a day  -- Do not take this drug if you are pregnant.  It is very important that you take or use this exactly as directed.  Do not skip doses or discontinue unless directed by your doctor.  Some non-prescription drugs may aggravate your condition.  Read all labels carefully.  If a warning appears, check with your doctor before taking.    -- Indication: For BP    Catapres 0.3 mg oral tablet  -- 1 tab(s) by mouth 3 times a day  -- It is very important that you take or use this exactly as directed.  Do not skip doses or discontinue unless directed by your doctor.  May cause drowsiness.  Alcohol may intensify this effect.  Use care when operating dangerous machinery.  Some non-prescription drugs may aggravate your condition.  Read all labels carefully.  If a warning appears, check with your doctor before taking.    -- Indication: For cardiac    doxazosin 2 mg oral tablet  -- 1 tab(s) by mouth once a day (at bedtime)  -- Indication: For cardiac    isosorbide mononitrate 60 mg oral tablet, extended release  -- 1 tab(s) by mouth once a day  -- Indication: For cardiac    gabapentin 300 mg oral capsule  -- 1 cap(s) by mouth once a day  -- It is very important that you take or use this exactly as directed.  Do not skip doses or discontinue unless directed by your doctor.  May cause drowsiness.  Alcohol may intensify this effect.  Use care when operating dangerous machinery.    -- Indication: For neuropathy    diphenhydrAMINE 50 mg oral capsule  -- 1 cap(s) by mouth once a day (at bedtime), As needed, Insomnia  -- Indication: For vertigo    atorvastatin 10 mg oral tablet  -- 1 tab(s) by mouth once a day (at bedtime)  -- Indication: For cholesterol    rifAMPin 300 mg oral capsule  -- 2 cap(s) by mouth once a day  -- Indication: For Anti infective    carvedilol 25 mg oral tablet  -- 2 tab(s) by mouth every 12 hours  -- Indication: For cardiac    amLODIPine 10 mg oral tablet  -- 1 tab(s) by mouth once a day  -- Indication: For BP    famotidine 20 mg oral tablet  -- 1 tab(s) by mouth once a day  -- Indication: For GI    docusate sodium 100 mg oral capsule  -- 1 cap(s) by mouth once a day  -- Indication: For constipation    polyethylene glycol 3350 oral powder for reconstitution  -- 17 gram(s) by mouth once a day, As needed, Constipation  -- Indication: For constipation    senna oral tablet  -- 2 tab(s) by mouth once a day (at bedtime)  -- Indication: For constipation    oxacillin 2 g/50 mL intravenous solution  -- 2 gram(s) intravenous every 4 hours  -- Indication: For Antibiotic    Renagel 800 mg oral tablet  -- 2 tab(s) by mouth 3 times a day  -- Indication: For phosphate    minoxidil 10 mg oral tablet  -- 1 tab(s) by mouth 3 times a day  -- Indication: For cardiac    hydrALAZINE 25 mg oral tablet  -- 4 tab(s) by mouth every 8 hours  -- It is very important that you take or use this exactly as directed.  Do not skip doses or discontinue unless directed by your doctor.  Some non-prescription drugs may aggravate your condition.  Read all labels carefully.  If a warning appears, check with your doctor before taking.    -- Indication: For cardiac
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
## 2017-01-11 NOTE — PROGRESS NOTE ADULT - SUBJECTIVE AND OBJECTIVE BOX
Procedure: Left arm AVF ligation    Diagnosis/Indication: Steal syndrome    Surgeon: Dr Ingram    S: Pain and numbness have improved.  Pain in upper arm at surgery site better with dilaudid 2mg.. Numbness of left fingers 1,2 &3 better.    O:  T(C): 36.9, Max: 36.9 (01-11 @ 09:35)  T(F): 98.4, Max: 98.4 (01-11 @ 09:35)  HR: 48 (46 - 53)  BP: 178/92 (158/70 - 189/75)  RR: 16 (16 - 16)  SpO2: 98% (95% - 100%)  Wt(kg): --                        9.1    3.2   )-----------( 105      ( 11 Jan 2017 02:49 )             28.1     11 Jan 2017 02:49    137    |  98     |  35     ----------------------------<  88     4.1     |  29     |  7.63     Ca    7.9        11 Jan 2017 02:49  Phos  5.5       11 Jan 2017 02:49  Mg     2.2       11 Jan 2017 02:49    TPro  7.4    /  Alb  2.9    /  TBili  1.2    /  DBili  x      /  AST  21     /  ALT  13     /  AlkPhos  50     10 Chucho 2017 16:55      Gen:  w/d w/n 38 y/o M NAD  C/V: RRR  Pulm: Clear  Abd:   Extremities: Left arm dressing blood stained. Radial pulse 2+.  strength 4/5.      A/P: 37yMale s/p above procedure  Diet  Dr Veloz called to HD.  HD today  Pain management consult  Pain/nausea control  DVT ppx:  Dispo plan: discharge tonight or tomorrow morning.

## 2017-01-11 NOTE — CONSULT NOTE ADULT - ATTENDING COMMENTS
AVF successfully ligated.   Dialysis today.   Dispo pending primary team. Next HD likely Fri if in house

## 2017-01-11 NOTE — CONSULT NOTE ADULT - RS GEN PE MLT RESP DETAILS PC
good air movement/airway patent/respirations non-labored/clear to auscultation bilaterally/breath sounds equal/normal

## 2017-01-11 NOTE — DISCHARGE NOTE ADULT - PROVIDER TOKENS
TOKCHICO:'[id number]' TOKEN:'[id number]:'[id number]' TOKEN:'[id number]:'[id number]',TOKEN:'[id number]'

## 2017-01-11 NOTE — DISCHARGE NOTE ADULT - NS AS DC FOLLOWUP STROKE INST
Smoking Cessation Smoking Cessation/Micromedex CareNotes: Arteriovenous Fistula Creating for Hemodialysis

## 2017-01-11 NOTE — CONSULT NOTE ADULT - MS EXT PE MLT D E PC
normal/no pedal edema/left upper extremity with bandage that is slightly soaked with blood, radial pulse palpable; right chest permcath

## 2017-01-11 NOTE — CONSULT NOTE ADULT - PROBLEM SELECTOR RECOMMENDATION 9
Patient is a 37 year old male with a history of ESRD on HD M/W/F whom presented to the hospital for ligation of AVF for steal syndrome. Patient is due for hemodialysis today via permcath.     P - Optiflux 180   3K bath   Goal EDW 79 kg over 4 hours   Monitor blood pressure during dialysis

## 2017-01-11 NOTE — DISCHARGE NOTE ADULT - SECONDARY DIAGNOSIS.
Aneurysm of thoracic aorta Aortic aneurysm End-stage renal disease on hemodialysis Essential hypertension Hyperlipidemia

## 2017-01-11 NOTE — DISCHARGE NOTE ADULT - MEDICATION SUMMARY - MEDICATIONS TO CHANGE
I will SWITCH the dose or number of times a day I take the medications listed below when I get home from the hospital:  None I will SWITCH the dose or number of times a day I take the medications listed below when I get home from the hospital:    oxyCODONE 15 mg oral tablet  -- 1 tab(s) by mouth every 4 hours MDD:6 tabs  -- Caution federal law prohibits the transfer of this drug to any person other  than the person for whom it was prescribed.  It is very important that you take or use this exactly as directed.  Do not skip doses or discontinue unless directed by your doctor.  May cause drowsiness.  Alcohol may intensify this effect.  Use care when operating dangerous machinery.  Swallow whole.  Do not crush.  This prescription cannot be refilled.  Using more of this medication than prescribed may cause serious breathing problems.

## 2017-01-11 NOTE — PROGRESS NOTE ADULT - SUBJECTIVE AND OBJECTIVE BOX
Patient was seen and evaluated on dialysis.   Patient is tolerating the procedure well.   HR: 57  BP: 157/67  Continue dialysis:   Optiflux 180   3K bath   Goal EDW 79 kg over 4 hours

## 2017-01-11 NOTE — DISCHARGE NOTE ADULT - PATIENT PORTAL LINK FT
“You can access the [employer] Patient Portal, offered by [facility], by registering with the following website: [email or web address]”

## 2017-01-12 LAB
HBV CORE AB SER-ACNC: SIGNIFICANT CHANGE UP
HBV SURFACE AB SER-ACNC: SIGNIFICANT CHANGE UP

## 2017-01-12 RX ORDER — ACETAMINOPHEN 500 MG
650 TABLET ORAL ONCE
Qty: 0 | Refills: 0 | Status: COMPLETED | OUTPATIENT
Start: 2017-01-12 | End: 2017-01-12

## 2017-01-12 RX ORDER — HYDRALAZINE HCL 50 MG
20 TABLET ORAL ONCE
Qty: 0 | Refills: 0 | Status: COMPLETED | OUTPATIENT
Start: 2017-01-12 | End: 2017-01-12

## 2017-01-12 RX ORDER — HYDRALAZINE HCL 50 MG
10 TABLET ORAL ONCE
Qty: 0 | Refills: 0 | Status: COMPLETED | OUTPATIENT
Start: 2017-01-12 | End: 2017-01-12

## 2017-01-12 RX ADMIN — Medication 100 GRAM(S): at 07:05

## 2017-01-12 RX ADMIN — Medication 100 GRAM(S): at 23:02

## 2017-01-12 RX ADMIN — Medication 0.3 MILLIGRAM(S): at 05:51

## 2017-01-12 RX ADMIN — Medication 100 GRAM(S): at 15:23

## 2017-01-12 RX ADMIN — HYDROMORPHONE HYDROCHLORIDE 2 MILLIGRAM(S): 2 INJECTION INTRAMUSCULAR; INTRAVENOUS; SUBCUTANEOUS at 11:18

## 2017-01-12 RX ADMIN — HYDROMORPHONE HYDROCHLORIDE 2 MILLIGRAM(S): 2 INJECTION INTRAMUSCULAR; INTRAVENOUS; SUBCUTANEOUS at 11:51

## 2017-01-12 RX ADMIN — CARVEDILOL PHOSPHATE 12.5 MILLIGRAM(S): 80 CAPSULE, EXTENDED RELEASE ORAL at 05:51

## 2017-01-12 RX ADMIN — ATORVASTATIN CALCIUM 10 MILLIGRAM(S): 80 TABLET, FILM COATED ORAL at 21:53

## 2017-01-12 RX ADMIN — Medication 10 MILLIGRAM(S): at 01:21

## 2017-01-12 RX ADMIN — SEVELAMER CARBONATE 1600 MILLIGRAM(S): 2400 POWDER, FOR SUSPENSION ORAL at 17:49

## 2017-01-12 RX ADMIN — HYDROMORPHONE HYDROCHLORIDE 2 MILLIGRAM(S): 2 INJECTION INTRAMUSCULAR; INTRAVENOUS; SUBCUTANEOUS at 19:36

## 2017-01-12 RX ADMIN — Medication 100 MILLIGRAM(S): at 11:18

## 2017-01-12 RX ADMIN — Medication 100 GRAM(S): at 19:20

## 2017-01-12 RX ADMIN — Medication 200 MILLIGRAM(S): at 00:40

## 2017-01-12 RX ADMIN — SENNA PLUS 1 TABLET(S): 8.6 TABLET ORAL at 11:18

## 2017-01-12 RX ADMIN — LOSARTAN POTASSIUM 50 MILLIGRAM(S): 100 TABLET, FILM COATED ORAL at 05:51

## 2017-01-12 RX ADMIN — HYDROMORPHONE HYDROCHLORIDE 2 MILLIGRAM(S): 2 INJECTION INTRAMUSCULAR; INTRAVENOUS; SUBCUTANEOUS at 23:08

## 2017-01-12 RX ADMIN — Medication 100 GRAM(S): at 23:01

## 2017-01-12 RX ADMIN — Medication 100 GRAM(S): at 03:32

## 2017-01-12 RX ADMIN — AMLODIPINE BESYLATE 10 MILLIGRAM(S): 2.5 TABLET ORAL at 05:51

## 2017-01-12 RX ADMIN — GABAPENTIN 300 MILLIGRAM(S): 400 CAPSULE ORAL at 11:16

## 2017-01-12 RX ADMIN — HYDROMORPHONE HYDROCHLORIDE 2 MILLIGRAM(S): 2 INJECTION INTRAMUSCULAR; INTRAVENOUS; SUBCUTANEOUS at 07:20

## 2017-01-12 RX ADMIN — SEVELAMER CARBONATE 1600 MILLIGRAM(S): 2400 POWDER, FOR SUSPENSION ORAL at 07:57

## 2017-01-12 RX ADMIN — CARVEDILOL PHOSPHATE 12.5 MILLIGRAM(S): 80 CAPSULE, EXTENDED RELEASE ORAL at 17:48

## 2017-01-12 RX ADMIN — Medication 0.3 MILLIGRAM(S): at 21:53

## 2017-01-12 RX ADMIN — HYDROMORPHONE HYDROCHLORIDE 2 MILLIGRAM(S): 2 INJECTION INTRAMUSCULAR; INTRAVENOUS; SUBCUTANEOUS at 03:30

## 2017-01-12 RX ADMIN — HYDROMORPHONE HYDROCHLORIDE 2 MILLIGRAM(S): 2 INJECTION INTRAMUSCULAR; INTRAVENOUS; SUBCUTANEOUS at 19:20

## 2017-01-12 RX ADMIN — HYDROMORPHONE HYDROCHLORIDE 2 MILLIGRAM(S): 2 INJECTION INTRAMUSCULAR; INTRAVENOUS; SUBCUTANEOUS at 03:16

## 2017-01-12 RX ADMIN — SEVELAMER CARBONATE 1600 MILLIGRAM(S): 2400 POWDER, FOR SUSPENSION ORAL at 11:19

## 2017-01-12 RX ADMIN — Medication 0.3 MILLIGRAM(S): at 13:26

## 2017-01-12 RX ADMIN — Medication 20 MILLIGRAM(S): at 21:53

## 2017-01-12 RX ADMIN — Medication 81 MILLIGRAM(S): at 11:18

## 2017-01-12 RX ADMIN — Medication 100 GRAM(S): at 11:16

## 2017-01-12 RX ADMIN — Medication 650 MILLIGRAM(S): at 22:36

## 2017-01-12 RX ADMIN — HYDROMORPHONE HYDROCHLORIDE 2 MILLIGRAM(S): 2 INJECTION INTRAMUSCULAR; INTRAVENOUS; SUBCUTANEOUS at 23:21

## 2017-01-12 RX ADMIN — Medication 20 MILLIGRAM(S): at 04:23

## 2017-01-12 RX ADMIN — HYDROMORPHONE HYDROCHLORIDE 2 MILLIGRAM(S): 2 INJECTION INTRAMUSCULAR; INTRAVENOUS; SUBCUTANEOUS at 15:09

## 2017-01-12 RX ADMIN — Medication 20 MILLIGRAM(S): at 19:20

## 2017-01-12 RX ADMIN — HYDROMORPHONE HYDROCHLORIDE 2 MILLIGRAM(S): 2 INJECTION INTRAMUSCULAR; INTRAVENOUS; SUBCUTANEOUS at 15:32

## 2017-01-12 RX ADMIN — HYDROMORPHONE HYDROCHLORIDE 2 MILLIGRAM(S): 2 INJECTION INTRAMUSCULAR; INTRAVENOUS; SUBCUTANEOUS at 07:05

## 2017-01-12 RX ADMIN — FAMOTIDINE 20 MILLIGRAM(S): 10 INJECTION INTRAVENOUS at 11:17

## 2017-01-12 RX ADMIN — ATORVASTATIN CALCIUM 10 MILLIGRAM(S): 80 TABLET, FILM COATED ORAL at 00:40

## 2017-01-12 RX ADMIN — Medication 25 MILLIGRAM(S): at 05:51

## 2017-01-12 NOTE — PROGRESS NOTE ADULT - RS GEN PE MLT RESP DETAILS PC
respirations non-labored/airway patent/breath sounds equal/normal/good air movement/clear to auscultation bilaterally

## 2017-01-12 NOTE — PROGRESS NOTE ADULT - SUBJECTIVE AND OBJECTIVE BOX
ON - No events.    S. No complaints. Left hand feels better.    oxacillin IVPB   rifampin 600  oxacillin IVPB 2  oxacillin IVPB   rifampin 600  oxacillin IVPB 2  hydrALAZINE 25  amLODIPine   Tablet 10  carvedilol 12.5  cloNIDine 0.3  losartan 50  aspirin  chewable 81      Allergies    morphine (Other)  nitroglycerin (Anaphylaxis)  shellfish (Anaphylaxis)    Intolerances        Vital Signs Last 24 Hrs  T(C): 37.2, Max: 37.3 (01-12 @ 01:01)  T(F): 98.9, Max: 99.2 (01-12 @ 01:01)  HR: 60 (46 - 83)  BP: 150/60 (150/60 - 204/99)  BP(mean): 97 (97 - 113)  RR: 19 (15 - 19)  SpO2: 94% (94% - 100%)    Physical Exam:  General: awake, alert. NAD  Pulmonary:  Cardiovascular:  Abdominal:  Extremities: Left arm dressing changed last PM. Wound cdi.   Pulses:   Right:                                                                           Left:  FEM [ ]2+ [ ]1+ [ ] doppler                                            FEM [ ]2+ [ ]1+ [ ] doppler    POP [ ]2+ [ ]1+ [ ] doppler                                            POP [ ]2+ [ ]1+ [ ] doppler    DP [ ]2+ [ ]1+ [ ] doppler                                               DP [ ]2+ [ ]1+ [ ] doppler  PT[ ]2+ [ ]1+ [ ] doppler                                                 PT [ ]2+ [ ]1+ [ ] doppler      LABS:                        9.1    3.2   )-----------( 105      ( 11 Jan 2017 02:49 )             28.1     11 Jan 2017 02:49    137    |  98     |  35     ----------------------------<  88     4.1     |  29     |  7.63     Ca    7.9        11 Jan 2017 02:49  Phos  5.5       11 Jan 2017 02:49  Mg     2.2       11 Jan 2017 02:49    TPro  7.4    /  Alb  2.9    /  TBili  1.2    /  DBili  x      /  AST  21     /  ALT  13     /  AlkPhos  50     10 Chucho 2017 16:55    PT/INR - ( 11 Jan 2017 02:49 )   PT: 16.0 sec;   INR: 1.44          PTT - ( 11 Jan 2017 02:49 )  PTT:52.4 sec      RADIOLOGY & ADDITIONAL TESTS:

## 2017-01-12 NOTE — DIETITIAN INITIAL EVALUATION ADULT. - OTHER INFO
Pt admitted w/ steal syndrome of hand; s/p ligitation of LUE AV fistula (1/11); on renal restrictions per dialysis, tolerates diet well w/ > 75% po intake; denies N/V/D/C; Follows diet restrictions at home, takes phos binder; has been getting dialysis x 2 years; education revised; pt w/ NKFA; skin w/ L arm at AV fistula site, w/no edema.

## 2017-01-12 NOTE — PROGRESS NOTE ADULT - SUBJECTIVE AND OBJECTIVE BOX
Patient seen and examined at bedside. Patient denies any shortness of breath, nausea, or vomiting. Patient was last dialyzed 1/11.     oxyCODONE IR 5milliGRAM(s) every 4 hours PRN  oxyCODONE IR 10milliGRAM(s) every 4 hours PRN  oxacillin IVPB    rifampin 600milliGRAM(s) daily  oxacillin IVPB 2Gram(s) every 4 hours  gabapentin 300milliGRAM(s) daily  atorvastatin 10milliGRAM(s) at bedtime  hydrALAZINE 25milliGRAM(s) daily  sevelamer hydrochloride 1600milliGRAM(s) three times a day with meals  amLODIPine   Tablet 10milliGRAM(s) daily  carvedilol 12.5milliGRAM(s) every 12 hours  cloNIDine 0.3milliGRAM(s) three times a day  docusate sodium 100milliGRAM(s) daily  senna 1Tablet(s) daily  losartan 50milliGRAM(s) daily  aspirin  chewable 81milliGRAM(s) daily  famotidine    Tablet 40milliGRAM(s) daily  HYDROmorphone  Injectable 2milliGRAM(s) every 4 hours PRN  HYDROmorphone  Injectable 0.5milliGRAM(s) once    Allergies    morphine (Other)  nitroglycerin (Anaphylaxis)  shellfish (Anaphylaxis)    Intolerances    T(C): , Max: 37.3 (01-12 @ 01:01)  T(F): , Max: 99.2 (01-12 @ 01:01)  HR: 55  BP: 191/84  BP(mean): 97  RR: 16  SpO2: 96%    I & Os for 24h ending 01-12 @ 07:00  =============================================  IN:    IV PiggyBack: 250 ml    Oral Fluid: 120 ml    Total IN: 370 ml  ---------------------------------------------  OUT:    Other: 4800 ml    Total OUT: 4800 ml  ---------------------------------------------  Total NET: -4430 ml    I & Os for current day (as of 01-12 @ 18:26)  =============================================  IN:    Oral Fluid: 180 ml    Total IN: 180 ml  ---------------------------------------------  OUT:    Total OUT: 0 ml  ---------------------------------------------  Total NET: 180 ml    LABS:                        9.1    3.2   )-----------( 105      ( 11 Jan 2017 02:49 )             28.1     11 Jan 2017 02:49    137    |  98     |  35     ----------------------------<  88     4.1     |  29     |  7.63     Ca    7.9        11 Jan 2017 02:49  Phos  5.5       11 Jan 2017 02:49  Mg     2.2       11 Jan 2017 02:49    PT/INR - ( 11 Jan 2017 02:49 )   PT: 16.0 sec;   INR: 1.44       PTT - ( 11 Jan 2017 02:49 )  PTT:52.4 sec

## 2017-01-12 NOTE — DIETITIAN INITIAL EVALUATION ADULT. - ENERGY NEEDS
Usual dry wt: 189# (86kg) , ABW: 87.5kg, no dry wt noted on this admission yet-- IBW: 78.2kg %IBW:  112% BMI:269; needs per ABW since Pt is within % of IBW; needs increased per HD; fluid needs:  UOP+ 1000ml

## 2017-01-12 NOTE — PROGRESS NOTE ADULT - ASSESSMENT
36 y/o M with PMHx malignant HTN, s/p Type A dissection repair, ESRD on HD (M/W/F - last dialyzed yesterday) with LUE AVF now with LUE steal syndrome s/p ligation of LUE AV Fistula 1/11    Pain control  renal diet  Home medication  F/u AM labs. 36 y/o M with PMHx malignant HTN, s/p Type A dissection repair, ESRD on HD (M/W/F - last dialyzed yesterday) with LUE AVF now with LUE steal syndrome s/p ligation of LUE AV Fistula 1/11  d/c home today with Abx infusion service reinstated.  Pain control  renal diet  Home medication

## 2017-01-13 LAB
ANION GAP SERPL CALC-SCNC: 11 MMOL/L — SIGNIFICANT CHANGE UP (ref 9–16)
BUN SERPL-MCNC: 18 MG/DL — SIGNIFICANT CHANGE UP (ref 7–23)
BUN SERPL-MCNC: 50 MG/DL — HIGH (ref 7–23)
CALCIUM SERPL-MCNC: 8.4 MG/DL — LOW (ref 8.5–10.5)
CHLORIDE SERPL-SCNC: 99 MMOL/L — SIGNIFICANT CHANGE UP (ref 96–108)
CO2 SERPL-SCNC: 25 MMOL/L — SIGNIFICANT CHANGE UP (ref 22–31)
CREAT SERPL-MCNC: 8.35 MG/DL — HIGH (ref 0.5–1.3)
GLUCOSE SERPL-MCNC: 108 MG/DL — HIGH (ref 70–99)
HCT VFR BLD CALC: 28.7 % — LOW (ref 39–50)
HGB BLD-MCNC: 9.4 G/DL — LOW (ref 13–17)
MCHC RBC-ENTMCNC: 32.8 G/DL — SIGNIFICANT CHANGE UP (ref 32–36)
MCHC RBC-ENTMCNC: 33.1 PG — SIGNIFICANT CHANGE UP (ref 27–34)
MCV RBC AUTO: 101.1 FL — HIGH (ref 80–100)
PLATELET # BLD AUTO: 122 K/UL — LOW (ref 150–400)
POTASSIUM SERPL-MCNC: 4.8 MMOL/L — SIGNIFICANT CHANGE UP (ref 3.5–5.3)
POTASSIUM SERPL-SCNC: 4.8 MMOL/L — SIGNIFICANT CHANGE UP (ref 3.5–5.3)
RBC # BLD: 2.84 M/UL — LOW (ref 4.2–5.8)
RBC # FLD: 17 % — HIGH (ref 10.3–16.9)
SODIUM SERPL-SCNC: 135 MMOL/L — SIGNIFICANT CHANGE UP (ref 135–145)
SURGICAL PATHOLOGY STUDY: SIGNIFICANT CHANGE UP
WBC # BLD: 3.7 K/UL — LOW (ref 3.8–10.5)
WBC # FLD AUTO: 3.7 K/UL — LOW (ref 3.8–10.5)

## 2017-01-13 PROCEDURE — 71010: CPT | Mod: 26

## 2017-01-13 RX ORDER — OXYCODONE HYDROCHLORIDE 5 MG/1
1 TABLET ORAL
Qty: 12 | Refills: 0 | OUTPATIENT
Start: 2017-01-13 | End: 2017-01-16

## 2017-01-13 RX ORDER — LIDOCAINE 4 G/100G
1 CREAM TOPICAL ONCE
Qty: 0 | Refills: 0 | Status: COMPLETED | OUTPATIENT
Start: 2017-01-13 | End: 2017-01-13

## 2017-01-13 RX ORDER — HYDROMORPHONE HYDROCHLORIDE 2 MG/ML
2 INJECTION INTRAMUSCULAR; INTRAVENOUS; SUBCUTANEOUS ONCE
Qty: 0 | Refills: 0 | Status: DISCONTINUED | OUTPATIENT
Start: 2017-01-13 | End: 2017-01-13

## 2017-01-13 RX ORDER — HYDRALAZINE HCL 50 MG
20 TABLET ORAL ONCE
Qty: 0 | Refills: 0 | Status: DISCONTINUED | OUTPATIENT
Start: 2017-01-13 | End: 2017-01-14

## 2017-01-13 RX ORDER — HYDRALAZINE HCL 50 MG
20 TABLET ORAL ONCE
Qty: 0 | Refills: 0 | Status: COMPLETED | OUTPATIENT
Start: 2017-01-13 | End: 2017-01-13

## 2017-01-13 RX ORDER — ACETAMINOPHEN 500 MG
650 TABLET ORAL ONCE
Qty: 0 | Refills: 0 | Status: COMPLETED | OUTPATIENT
Start: 2017-01-13 | End: 2017-01-13

## 2017-01-13 RX ORDER — HYDROMORPHONE HYDROCHLORIDE 2 MG/ML
1 INJECTION INTRAMUSCULAR; INTRAVENOUS; SUBCUTANEOUS ONCE
Qty: 0 | Refills: 0 | Status: DISCONTINUED | OUTPATIENT
Start: 2017-01-13 | End: 2017-01-13

## 2017-01-13 RX ORDER — HYDRALAZINE HCL 50 MG
100 TABLET ORAL THREE TIMES A DAY
Qty: 0 | Refills: 0 | Status: DISCONTINUED | OUTPATIENT
Start: 2017-01-13 | End: 2017-01-13

## 2017-01-13 RX ORDER — ERYTHROPOIETIN 10000 [IU]/ML
10000 INJECTION, SOLUTION INTRAVENOUS; SUBCUTANEOUS ONCE
Qty: 0 | Refills: 0 | Status: DISCONTINUED | OUTPATIENT
Start: 2017-01-13 | End: 2017-01-14

## 2017-01-13 RX ORDER — MINOXIDIL 10 MG
25 TABLET ORAL THREE TIMES A DAY
Qty: 0 | Refills: 0 | Status: DISCONTINUED | OUTPATIENT
Start: 2017-01-13 | End: 2017-01-14

## 2017-01-13 RX ORDER — HYDRALAZINE HCL 50 MG
100 TABLET ORAL EVERY 8 HOURS
Qty: 0 | Refills: 0 | Status: DISCONTINUED | OUTPATIENT
Start: 2017-01-13 | End: 2017-01-13

## 2017-01-13 RX ORDER — MINOXIDIL 10 MG
10 TABLET ORAL THREE TIMES A DAY
Qty: 0 | Refills: 0 | Status: DISCONTINUED | OUTPATIENT
Start: 2017-01-13 | End: 2017-01-13

## 2017-01-13 RX ORDER — HYDRALAZINE HCL 50 MG
100 TABLET ORAL EVERY 8 HOURS
Qty: 0 | Refills: 0 | Status: DISCONTINUED | OUTPATIENT
Start: 2017-01-13 | End: 2017-01-14

## 2017-01-13 RX ORDER — HYDROMORPHONE HYDROCHLORIDE 2 MG/ML
4 INJECTION INTRAMUSCULAR; INTRAVENOUS; SUBCUTANEOUS ONCE
Qty: 0 | Refills: 0 | Status: DISCONTINUED | OUTPATIENT
Start: 2017-01-13 | End: 2017-01-13

## 2017-01-13 RX ADMIN — HYDROMORPHONE HYDROCHLORIDE 1 MILLIGRAM(S): 2 INJECTION INTRAMUSCULAR; INTRAVENOUS; SUBCUTANEOUS at 03:44

## 2017-01-13 RX ADMIN — HYDROMORPHONE HYDROCHLORIDE 2 MILLIGRAM(S): 2 INJECTION INTRAMUSCULAR; INTRAVENOUS; SUBCUTANEOUS at 03:30

## 2017-01-13 RX ADMIN — Medication 650 MILLIGRAM(S): at 23:00

## 2017-01-13 RX ADMIN — Medication 100 GRAM(S): at 03:07

## 2017-01-13 RX ADMIN — Medication 20 MILLIGRAM(S): at 03:33

## 2017-01-13 RX ADMIN — AMLODIPINE BESYLATE 10 MILLIGRAM(S): 2.5 TABLET ORAL at 05:39

## 2017-01-13 RX ADMIN — CARVEDILOL PHOSPHATE 12.5 MILLIGRAM(S): 80 CAPSULE, EXTENDED RELEASE ORAL at 05:39

## 2017-01-13 RX ADMIN — GABAPENTIN 300 MILLIGRAM(S): 400 CAPSULE ORAL at 15:02

## 2017-01-13 RX ADMIN — Medication 650 MILLIGRAM(S): at 19:53

## 2017-01-13 RX ADMIN — LIDOCAINE 1 PATCH: 4 CREAM TOPICAL at 22:23

## 2017-01-13 RX ADMIN — OXYCODONE HYDROCHLORIDE 10 MILLIGRAM(S): 5 TABLET ORAL at 15:02

## 2017-01-13 RX ADMIN — Medication 0.3 MILLIGRAM(S): at 05:39

## 2017-01-13 RX ADMIN — Medication 0.2 MILLIGRAM(S): at 15:10

## 2017-01-13 RX ADMIN — OXYCODONE HYDROCHLORIDE 10 MILLIGRAM(S): 5 TABLET ORAL at 15:32

## 2017-01-13 RX ADMIN — HYDROMORPHONE HYDROCHLORIDE 2 MILLIGRAM(S): 2 INJECTION INTRAMUSCULAR; INTRAVENOUS; SUBCUTANEOUS at 03:08

## 2017-01-13 RX ADMIN — Medication 0.3 MILLIGRAM(S): at 21:59

## 2017-01-13 RX ADMIN — Medication 81 MILLIGRAM(S): at 15:02

## 2017-01-13 RX ADMIN — Medication 25 MILLIGRAM(S): at 19:54

## 2017-01-13 RX ADMIN — HYDROMORPHONE HYDROCHLORIDE 2 MILLIGRAM(S): 2 INJECTION INTRAMUSCULAR; INTRAVENOUS; SUBCUTANEOUS at 11:45

## 2017-01-13 RX ADMIN — FAMOTIDINE 40 MILLIGRAM(S): 10 INJECTION INTRAVENOUS at 15:03

## 2017-01-13 RX ADMIN — Medication 100 GRAM(S): at 15:02

## 2017-01-13 RX ADMIN — HYDROMORPHONE HYDROCHLORIDE 4 MILLIGRAM(S): 2 INJECTION INTRAMUSCULAR; INTRAVENOUS; SUBCUTANEOUS at 17:26

## 2017-01-13 RX ADMIN — Medication 650 MILLIGRAM(S): at 22:06

## 2017-01-13 RX ADMIN — HYDROMORPHONE HYDROCHLORIDE 2 MILLIGRAM(S): 2 INJECTION INTRAMUSCULAR; INTRAVENOUS; SUBCUTANEOUS at 06:45

## 2017-01-13 RX ADMIN — HYDROMORPHONE HYDROCHLORIDE 1 MILLIGRAM(S): 2 INJECTION INTRAMUSCULAR; INTRAVENOUS; SUBCUTANEOUS at 04:00

## 2017-01-13 RX ADMIN — Medication 25 MILLIGRAM(S): at 05:39

## 2017-01-13 RX ADMIN — Medication 100 GRAM(S): at 06:45

## 2017-01-13 RX ADMIN — SEVELAMER CARBONATE 1600 MILLIGRAM(S): 2400 POWDER, FOR SUSPENSION ORAL at 17:25

## 2017-01-13 RX ADMIN — HYDROMORPHONE HYDROCHLORIDE 2 MILLIGRAM(S): 2 INJECTION INTRAMUSCULAR; INTRAVENOUS; SUBCUTANEOUS at 11:34

## 2017-01-13 RX ADMIN — HYDROMORPHONE HYDROCHLORIDE 4 MILLIGRAM(S): 2 INJECTION INTRAMUSCULAR; INTRAVENOUS; SUBCUTANEOUS at 17:43

## 2017-01-13 RX ADMIN — LOSARTAN POTASSIUM 50 MILLIGRAM(S): 100 TABLET, FILM COATED ORAL at 05:39

## 2017-01-13 RX ADMIN — Medication 20 MILLIGRAM(S): at 06:15

## 2017-01-13 RX ADMIN — Medication 100 MILLIGRAM(S): at 19:54

## 2017-01-13 RX ADMIN — Medication 100 GRAM(S): at 19:56

## 2017-01-13 RX ADMIN — ATORVASTATIN CALCIUM 10 MILLIGRAM(S): 80 TABLET, FILM COATED ORAL at 21:59

## 2017-01-13 RX ADMIN — Medication 20 MILLIGRAM(S): at 16:34

## 2017-01-13 RX ADMIN — HYDROMORPHONE HYDROCHLORIDE 2 MILLIGRAM(S): 2 INJECTION INTRAMUSCULAR; INTRAVENOUS; SUBCUTANEOUS at 07:01

## 2017-01-13 RX ADMIN — SEVELAMER CARBONATE 1600 MILLIGRAM(S): 2400 POWDER, FOR SUSPENSION ORAL at 08:35

## 2017-01-13 RX ADMIN — Medication 100 GRAM(S): at 23:03

## 2017-01-13 RX ADMIN — CARVEDILOL PHOSPHATE 12.5 MILLIGRAM(S): 80 CAPSULE, EXTENDED RELEASE ORAL at 17:25

## 2017-01-13 RX ADMIN — Medication 20 MILLIGRAM(S): at 00:25

## 2017-01-13 NOTE — PROVIDER CONTACT NOTE (OTHER) - ASSESSMENT
Pt with old surgical scar in area. Pt with increased tenderness to LUQ upon palpation.
V/S Temp 99.1, B/P 201/88, HR 61, RR 17,O2 95%.

## 2017-01-13 NOTE — PROGRESS NOTE ADULT - SUBJECTIVE AND OBJECTIVE BOX
o/n: SBP 220s hydralazine 20 x4 given; t- 100.1 tylenol x1 given  1/12: small seroma over incision ACE applied Vascular Surgery Progress Note    o/n: SBP 220s hydralazine 20 x4 given; t- 100.1 tylenol x1 given  1/12: small seroma over incision ACE applied    oxacillin IVPB   rifampin 600  oxacillin IVPB 2  heparin  Injectable 5000  oxacillin IVPB   rifampin 600  oxacillin IVPB 2  hydrALAZINE 25  amLODIPine   Tablet 10  carvedilol 12.5  cloNIDine 0.3  losartan 50  aspirin  chewable 81      Allergies  morphine (Other)  nitroglycerin (Anaphylaxis)  shellfish (Anaphylaxis)        Vital Signs Last 24 Hrs  T(C): 37.3, Max: 37.8 (01-12 @ 22:19)  T(F): 99.1, Max: 100.1 (01-12 @ 22:19)  HR: 62 (55 - 67)  BP: 194/72 (172/81 - 235/98)  BP(mean): --  RR: 16 (16 - 18)  SpO2: 98% (95% - 100%)  I&O's Summary      Physical Exam:  General: AAOx3, NAD  Extremities: LUE incision c/d/i, no surrounding erythema or wamrth, +seroma and some swelling - arm wrapped with kerlix and ACE, resume elevation  Pulses: +radial pulse, +ulnar pulse
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
## 2017-01-13 NOTE — PROVIDER CONTACT NOTE (OTHER) - ACTION/TREATMENT ORDERED:
PA informed of above and stated will assess pt. Pain relieved with IV dilaudid.
PA states pt got hydralazine 20 mg IV before transfer so to do vitals again within 30 minutes

## 2017-01-13 NOTE — PROGRESS NOTE ADULT - ASSESSMENT
38 y/o M with PMHx malignant HTN, s/p Type A dissection repair, ESRD on HD (M/W/F - last dialyzed yesterday) with LUE AVF now with LUE steal syndrome s/p ligation of LUE AV Fistula 1/11  d/c home today with Abx infusion service reinstated.  Pain control  renal diet  Home medication 36 y/o M with PMHx malignant HTN, s/p Type A dissection repair, ESRD on HD (M/W/F - last dialyzed yesterday) with LUE AVF now with LUE steal syndrome s/p ligation of LUE AV Fistula 1/11  d/c home today with Abx infusion service reinstated.  Pain control  renal diet  Home medication  HD today

## 2017-01-14 VITALS
RESPIRATION RATE: 16 BRPM | TEMPERATURE: 98 F | DIASTOLIC BLOOD PRESSURE: 62 MMHG | OXYGEN SATURATION: 95 % | HEART RATE: 59 BPM | SYSTOLIC BLOOD PRESSURE: 118 MMHG

## 2017-01-14 PROCEDURE — 85610 PROTHROMBIN TIME: CPT

## 2017-01-14 PROCEDURE — 96374 THER/PROPH/DIAG INJ IV PUSH: CPT

## 2017-01-14 PROCEDURE — 90935 HEMODIALYSIS ONE EVALUATION: CPT

## 2017-01-14 PROCEDURE — 86803 HEPATITIS C AB TEST: CPT

## 2017-01-14 PROCEDURE — 83880 ASSAY OF NATRIURETIC PEPTIDE: CPT

## 2017-01-14 PROCEDURE — 83735 ASSAY OF MAGNESIUM: CPT

## 2017-01-14 PROCEDURE — 96376 TX/PRO/DX INJ SAME DRUG ADON: CPT

## 2017-01-14 PROCEDURE — 85027 COMPLETE CBC AUTOMATED: CPT

## 2017-01-14 PROCEDURE — 85025 COMPLETE CBC W/AUTO DIFF WBC: CPT

## 2017-01-14 PROCEDURE — 36415 COLL VENOUS BLD VENIPUNCTURE: CPT

## 2017-01-14 PROCEDURE — 80053 COMPREHEN METABOLIC PANEL: CPT

## 2017-01-14 PROCEDURE — C1889: CPT

## 2017-01-14 PROCEDURE — 86901 BLOOD TYPING SEROLOGIC RH(D): CPT

## 2017-01-14 PROCEDURE — 80048 BASIC METABOLIC PNL TOTAL CA: CPT

## 2017-01-14 PROCEDURE — 88304 TISSUE EXAM BY PATHOLOGIST: CPT

## 2017-01-14 PROCEDURE — 99285 EMERGENCY DEPT VISIT HI MDM: CPT | Mod: 25

## 2017-01-14 PROCEDURE — 84100 ASSAY OF PHOSPHORUS: CPT

## 2017-01-14 PROCEDURE — 86900 BLOOD TYPING SEROLOGIC ABO: CPT

## 2017-01-14 PROCEDURE — 85730 THROMBOPLASTIN TIME PARTIAL: CPT

## 2017-01-14 PROCEDURE — 87340 HEPATITIS B SURFACE AG IA: CPT

## 2017-01-14 PROCEDURE — 86850 RBC ANTIBODY SCREEN: CPT

## 2017-01-14 PROCEDURE — 86706 HEP B SURFACE ANTIBODY: CPT

## 2017-01-14 PROCEDURE — 93005 ELECTROCARDIOGRAM TRACING: CPT

## 2017-01-14 PROCEDURE — 71045 X-RAY EXAM CHEST 1 VIEW: CPT

## 2017-01-14 PROCEDURE — 84520 ASSAY OF UREA NITROGEN: CPT

## 2017-01-14 PROCEDURE — 86704 HEP B CORE ANTIBODY TOTAL: CPT

## 2017-01-14 RX ADMIN — Medication 25 MILLIGRAM(S): at 06:15

## 2017-01-14 RX ADMIN — Medication 81 MILLIGRAM(S): at 11:35

## 2017-01-14 RX ADMIN — Medication 100 MILLIGRAM(S): at 06:16

## 2017-01-14 RX ADMIN — SENNA PLUS 1 TABLET(S): 8.6 TABLET ORAL at 11:36

## 2017-01-14 RX ADMIN — SEVELAMER CARBONATE 1600 MILLIGRAM(S): 2400 POWDER, FOR SUSPENSION ORAL at 11:36

## 2017-01-14 RX ADMIN — LOSARTAN POTASSIUM 50 MILLIGRAM(S): 100 TABLET, FILM COATED ORAL at 06:16

## 2017-01-14 RX ADMIN — SEVELAMER CARBONATE 1600 MILLIGRAM(S): 2400 POWDER, FOR SUSPENSION ORAL at 08:34

## 2017-01-14 RX ADMIN — Medication 100 GRAM(S): at 06:17

## 2017-01-14 RX ADMIN — Medication 100 GRAM(S): at 02:40

## 2017-01-14 RX ADMIN — GABAPENTIN 300 MILLIGRAM(S): 400 CAPSULE ORAL at 11:35

## 2017-01-14 RX ADMIN — Medication 0.3 MILLIGRAM(S): at 06:15

## 2017-01-14 RX ADMIN — Medication 100 MILLIGRAM(S): at 11:36

## 2017-01-14 RX ADMIN — AMLODIPINE BESYLATE 10 MILLIGRAM(S): 2.5 TABLET ORAL at 06:16

## 2017-01-14 RX ADMIN — FAMOTIDINE 40 MILLIGRAM(S): 10 INJECTION INTRAVENOUS at 11:36

## 2017-01-14 RX ADMIN — CARVEDILOL PHOSPHATE 12.5 MILLIGRAM(S): 80 CAPSULE, EXTENDED RELEASE ORAL at 06:16

## 2017-01-14 NOTE — PROGRESS NOTE ADULT - PROBLEM SELECTOR PLAN 2
c/w current antihypertensive regimen
- Patient hypertensive overnight with sbp in the 200s  - should improve with HD and additional UF  - PLEASE MAKE SURE PATIENT GETS BP MEDS ON TIME. MISSING DOSES CAN CAUSE REBOUND HTN.  - ALSO ok to give patient bp meds before HD if they are scheduled at that time.    c/w current antihypertensive regimen for now
continue home antihypertensive medications   will attempt more UF during dialysis tomorrow  Fluid restriction

## 2017-01-14 NOTE — PROGRESS NOTE ADULT - SUBJECTIVE AND OBJECTIVE BOX
o/n: TATE  1/13: started on minoxidil and increased hydralazine which are home meds now sbp stable o/n: TATE  1/13: started on minoxidil and increased hydralazine which are home meds now sbp stable    PT IS REFUSING DIALYSIS TODAY, WANTS TO GO HOME        MEDICATIONS  (STANDING):  heparin  Injectable 5000Unit(s) SubCutaneous every 8 hours  oxacillin IVPB  IV Intermittent   rifampin 600milliGRAM(s) Oral daily  oxacillin IVPB 2Gram(s) IV Intermittent every 4 hours  gabapentin 300milliGRAM(s) Oral daily  atorvastatin 10milliGRAM(s) Oral at bedtime  sevelamer hydrochloride 1600milliGRAM(s) Oral three times a day with meals  amLODIPine   Tablet 10milliGRAM(s) Oral daily  carvedilol 12.5milliGRAM(s) Oral every 12 hours  cloNIDine 0.3milliGRAM(s) Oral three times a day  docusate sodium 100milliGRAM(s) Oral daily  senna 1Tablet(s) Oral daily  losartan 50milliGRAM(s) Oral daily  aspirin  chewable 81milliGRAM(s) Oral daily  famotidine    Tablet 40milliGRAM(s) Oral daily  epoetin maite Injectable 51399Himk(s) IV Push once  hydrALAZINE Injectable 20milliGRAM(s) IV Push once  minoxidil 25milliGRAM(s) Oral three times a day  hydrALAZINE 100milliGRAM(s) Oral every 8 hours    MEDICATIONS  (PRN):      Allergies    morphine (Other)  nitroglycerin (Anaphylaxis)  shellfish (Anaphylaxis)    Intolerances        Vital Signs Last 24 Hrs  T(C): 36.6, Max: 37.6 (01-13 @ 14:57)  T(F): 97.8, Max: 99.7 (01-13 @ 14:57)  HR: 59 (59 - 105)  BP: 118/62 (118/62 - 236/86)  BP(mean): --  RR: 16 (16 - 18)  SpO2: 95% (93% - 100%)    I&O's Summary      Physical Exam:  General: NAD, resting comfortably  Pulmonary: normal resp effort, CTA-B  Cardiovascular: NSR  Abdominal: soft, NT/ND  Extremities: WWP, normal strength. R PICC line removed. Has R IJ Permacath for HD. LLE hand swollen (s/p fistula ligation), re-wrapped with ACE bandage.     LABS:                        9.4    3.7   )-----------( 122      ( 13 Jan 2017 10:13 )             28.7     13 Jan 2017 14:33    x      |  x      |  18     ----------------------------<  x      x       |  x      |  x        Ca    8.4        13 Jan 2017 10:12            CAPILLARY BLOOD GLUCOSE      RADIOLOGY & ADDITIONAL TESTS:

## 2017-01-14 NOTE — PROGRESS NOTE ADULT - PROBLEM SELECTOR PLAN 1
- patient tolerating HD  - however, high weight gain and bp elevated  - /87, HR - 59  Goal UF increased to 5 L + clearance in 2K+ bath  - qb - 400, qd - 500. HD time also extended by 30 min  - recommend fluid restriction to 1.5 L/day
- Plan for additional HD today for UF + clearance  - Patient's volume status is hypervolemic
wilda is a 37 year old male with a history of ESRD on HD M/W/F whom presented to the hospital for ligation of AVF for steal syndrome. Patient was last dialyzed 1/11 via permcath     P - no need for dialysis today   planned for dialysis tomorrow

## 2017-01-14 NOTE — PROGRESS NOTE ADULT - ASSESSMENT
36 y/o M with PMHx malignant HTN, s/p Type A dissection repair, ESRD on HD (M/W/F - last dialyzed yesterday) with LUE AVF now with LUE steal syndrome s/p ligation of LUE AV Fistula 1/11  d/c home today with Abx infusion service reinstated.  Pain control  renal diet  Home medication  HD today 38 y/o M with PMHx malignant HTN, s/p Type A dissection repair, ESRD on HD (M/W/F - last dialyzed yesterday) with LUE AVF now with LUE steal syndrome s/p ligation of LUE AV Fistula 1/11  d/c home today  Pain control  renal diet  Home medication  Refusing HD today. I have emphasized importance of attending HD on MWF as normal, starting this Monday (1/16/17).

## 2017-01-14 NOTE — PROGRESS NOTE ADULT - SUBJECTIVE AND OBJECTIVE BOX
Addendum:  Patient refusing HD  I explained to him importance of volume control to help lower BP.  Explained the risks of not achieving BP control.     However, patient refused repeat session today.  Stated he wants to be discharged.
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
## 2017-01-14 NOTE — PROGRESS NOTE ADULT - PROBLEM SELECTOR PROBLEM 1
End-stage renal disease on hemodialysis

## 2017-01-14 NOTE — PROGRESS NOTE ADULT - PROBLEM SELECTOR PLAN 3
Patient underwent ligation of AVF   Management as per primary team.

## 2017-01-18 DIAGNOSIS — I12.0 HYPERTENSIVE CHRONIC KIDNEY DISEASE WITH STAGE 5 CHRONIC KIDNEY DISEASE OR END STAGE RENAL DISEASE: ICD-10-CM

## 2017-01-18 DIAGNOSIS — T82.898A OTHER SPECIFIED COMPLICATION OF VASCULAR PROSTHETIC DEVICES, IMPLANTS AND GRAFTS, INITIAL ENCOUNTER: ICD-10-CM

## 2017-01-18 DIAGNOSIS — Z91.013 ALLERGY TO SEAFOOD: ICD-10-CM

## 2017-01-18 DIAGNOSIS — N18.6 END STAGE RENAL DISEASE: ICD-10-CM

## 2017-01-18 DIAGNOSIS — Z79.82 LONG TERM (CURRENT) USE OF ASPIRIN: ICD-10-CM

## 2017-01-18 DIAGNOSIS — E78.5 HYPERLIPIDEMIA, UNSPECIFIED: ICD-10-CM

## 2017-01-18 DIAGNOSIS — Z99.2 DEPENDENCE ON RENAL DIALYSIS: ICD-10-CM

## 2017-01-18 DIAGNOSIS — Z88.6 ALLERGY STATUS TO ANALGESIC AGENT: ICD-10-CM

## 2017-01-24 ENCOUNTER — APPOINTMENT (OUTPATIENT)
Dept: VASCULAR SURGERY | Facility: CLINIC | Age: 37
End: 2017-01-24

## 2017-01-31 ENCOUNTER — MEDICATION RENEWAL (OUTPATIENT)
Age: 37
End: 2017-01-31

## 2017-01-31 RX ORDER — ISOSORBIDE MONONITRATE 60 MG/1
60 TABLET, EXTENDED RELEASE ORAL DAILY
Qty: 30 | Refills: 5 | Status: ACTIVE | COMMUNITY
Start: 2017-01-31 | End: 1900-01-01

## 2017-01-31 RX ORDER — DOXAZOSIN 2 MG/1
2 TABLET ORAL DAILY
Qty: 90 | Refills: 3 | Status: ACTIVE | COMMUNITY
Start: 2017-01-31 | End: 1900-01-01

## 2017-01-31 RX ORDER — CLONIDINE HYDROCHLORIDE 0.3 MG/1
0.3 TABLET ORAL 3 TIMES DAILY
Qty: 90 | Refills: 5 | Status: ACTIVE | COMMUNITY
Start: 2017-01-31 | End: 1900-01-01

## 2017-02-16 ENCOUNTER — RECORD ABSTRACTING (OUTPATIENT)
Age: 37
End: 2017-02-16

## 2017-02-16 RX ORDER — RENAGEL 800 MG/1
800 TABLET ORAL 3 TIMES DAILY
Qty: 180 | Refills: 5 | Status: DISCONTINUED | COMMUNITY
Start: 2017-01-31 | End: 2017-02-16

## 2017-06-20 ENCOUNTER — APPOINTMENT (OUTPATIENT)
Dept: VASCULAR SURGERY | Facility: CLINIC | Age: 37
End: 2017-06-20

## 2017-06-20 DIAGNOSIS — T82.898A OTHER SPECIFIED COMPLICATION OF VASCULAR PROSTHETIC DEVICES, IMPLANTS AND GRAFTS, INITIAL ENCOUNTER: ICD-10-CM

## 2017-06-20 RX ORDER — SEVELAMER CARBONATE 800 MG/1
800 TABLET, FILM COATED ORAL
Refills: 0 | Status: ACTIVE | COMMUNITY

## 2017-06-21 VITALS
SYSTOLIC BLOOD PRESSURE: 170 MMHG | WEIGHT: 180 LBS | BODY MASS INDEX: 25.2 KG/M2 | RESPIRATION RATE: 12 BRPM | HEIGHT: 71 IN | DIASTOLIC BLOOD PRESSURE: 88 MMHG | OXYGEN SATURATION: 99 % | HEART RATE: 76 BPM
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
## 2017-06-22 ENCOUNTER — APPOINTMENT (OUTPATIENT)
Dept: VASCULAR SURGERY | Facility: HOSPITAL | Age: 37
End: 2017-06-22

## 2017-06-22 ENCOUNTER — OUTPATIENT (OUTPATIENT)
Dept: OUTPATIENT SERVICES | Facility: HOSPITAL | Age: 37
LOS: 1 days | Discharge: HOME | End: 2017-06-22
Payer: MEDICARE

## 2017-06-22 VITALS
HEIGHT: 71 IN | WEIGHT: 188.94 LBS | OXYGEN SATURATION: 97 % | DIASTOLIC BLOOD PRESSURE: 58 MMHG | SYSTOLIC BLOOD PRESSURE: 121 MMHG | TEMPERATURE: 98 F | RESPIRATION RATE: 16 BRPM

## 2017-06-22 DIAGNOSIS — Z98.89 OTHER SPECIFIED POSTPROCEDURAL STATES: Chronic | ICD-10-CM

## 2017-06-22 LAB — POTASSIUM BLDV-SCNC: 5 MMOL/L — HIGH (ref 3.5–4.9)

## 2017-06-22 PROCEDURE — 36818 AV FUSE UPPR ARM CEPHALIC: CPT | Mod: GC

## 2017-06-22 RX ORDER — HYDROMORPHONE HYDROCHLORIDE 2 MG/ML
1 INJECTION INTRAMUSCULAR; INTRAVENOUS; SUBCUTANEOUS EVERY 4 HOURS
Qty: 0 | Refills: 0 | Status: DISCONTINUED | OUTPATIENT
Start: 2017-06-22 | End: 2017-06-23

## 2017-06-22 RX ORDER — HYDROMORPHONE HYDROCHLORIDE 2 MG/ML
1 INJECTION INTRAMUSCULAR; INTRAVENOUS; SUBCUTANEOUS ONCE
Qty: 0 | Refills: 0 | Status: DISCONTINUED | OUTPATIENT
Start: 2017-06-22 | End: 2017-06-22

## 2017-06-22 RX ORDER — ONDANSETRON 8 MG/1
4 TABLET, FILM COATED ORAL ONCE
Qty: 0 | Refills: 0 | Status: DISCONTINUED | OUTPATIENT
Start: 2017-06-22 | End: 2017-06-23

## 2017-06-22 RX ORDER — HYDROMORPHONE HYDROCHLORIDE 2 MG/ML
1 INJECTION INTRAMUSCULAR; INTRAVENOUS; SUBCUTANEOUS
Qty: 0 | Refills: 0 | Status: DISCONTINUED | OUTPATIENT
Start: 2017-06-22 | End: 2017-06-22

## 2017-06-22 RX ADMIN — HYDROMORPHONE HYDROCHLORIDE 1 MILLIGRAM(S): 2 INJECTION INTRAMUSCULAR; INTRAVENOUS; SUBCUTANEOUS at 17:15

## 2017-06-22 RX ADMIN — HYDROMORPHONE HYDROCHLORIDE 1 MILLIGRAM(S): 2 INJECTION INTRAMUSCULAR; INTRAVENOUS; SUBCUTANEOUS at 22:02

## 2017-06-22 RX ADMIN — HYDROMORPHONE HYDROCHLORIDE 1 MILLIGRAM(S): 2 INJECTION INTRAMUSCULAR; INTRAVENOUS; SUBCUTANEOUS at 18:40

## 2017-06-22 RX ADMIN — HYDROMORPHONE HYDROCHLORIDE 1 MILLIGRAM(S): 2 INJECTION INTRAMUSCULAR; INTRAVENOUS; SUBCUTANEOUS at 21:41

## 2017-06-22 RX ADMIN — HYDROMORPHONE HYDROCHLORIDE 1 MILLIGRAM(S): 2 INJECTION INTRAMUSCULAR; INTRAVENOUS; SUBCUTANEOUS at 18:06

## 2017-06-22 RX ADMIN — HYDROMORPHONE HYDROCHLORIDE 1 MILLIGRAM(S): 2 INJECTION INTRAMUSCULAR; INTRAVENOUS; SUBCUTANEOUS at 16:42

## 2017-06-22 RX ADMIN — HYDROMORPHONE HYDROCHLORIDE 1 MILLIGRAM(S): 2 INJECTION INTRAMUSCULAR; INTRAVENOUS; SUBCUTANEOUS at 15:00

## 2017-06-22 RX ADMIN — HYDROMORPHONE HYDROCHLORIDE 1 MILLIGRAM(S): 2 INJECTION INTRAMUSCULAR; INTRAVENOUS; SUBCUTANEOUS at 15:26

## 2017-06-23 VITALS
SYSTOLIC BLOOD PRESSURE: 153 MMHG | RESPIRATION RATE: 16 BRPM | OXYGEN SATURATION: 95 % | TEMPERATURE: 99 F | DIASTOLIC BLOOD PRESSURE: 86 MMHG | HEART RATE: 84 BPM

## 2017-06-23 DIAGNOSIS — D64.9 ANEMIA, UNSPECIFIED: ICD-10-CM

## 2017-06-23 DIAGNOSIS — I10 ESSENTIAL (PRIMARY) HYPERTENSION: ICD-10-CM

## 2017-06-23 DIAGNOSIS — N18.6 END STAGE RENAL DISEASE: ICD-10-CM

## 2017-06-23 PROCEDURE — C1889: CPT

## 2017-06-23 PROCEDURE — 86850 RBC ANTIBODY SCREEN: CPT

## 2017-06-23 PROCEDURE — C1781: CPT

## 2017-06-23 PROCEDURE — 86900 BLOOD TYPING SEROLOGIC ABO: CPT

## 2017-06-23 PROCEDURE — 93010 ELECTROCARDIOGRAM REPORT: CPT

## 2017-06-23 PROCEDURE — 86901 BLOOD TYPING SEROLOGIC RH(D): CPT

## 2017-06-23 PROCEDURE — 86923 COMPATIBILITY TEST ELECTRIC: CPT

## 2017-06-23 PROCEDURE — 84132 ASSAY OF SERUM POTASSIUM: CPT

## 2017-06-23 PROCEDURE — 90935 HEMODIALYSIS ONE EVALUATION: CPT

## 2017-06-23 PROCEDURE — 36819 AV FUSE UPPR ARM BASILIC: CPT | Mod: RT

## 2017-06-23 PROCEDURE — C1768: CPT

## 2017-06-23 PROCEDURE — 93005 ELECTROCARDIOGRAM TRACING: CPT

## 2017-06-23 RX ORDER — OXYCODONE HYDROCHLORIDE 5 MG/1
2 TABLET ORAL
Qty: 24 | Refills: 0 | OUTPATIENT
Start: 2017-06-23 | End: 2017-06-26

## 2017-06-23 RX ORDER — OXYCODONE HYDROCHLORIDE 5 MG/1
1 TABLET ORAL
Qty: 12 | Refills: 0 | OUTPATIENT
Start: 2017-06-23 | End: 2017-06-26

## 2017-06-23 RX ORDER — ERYTHROPOIETIN 10000 [IU]/ML
10000 INJECTION, SOLUTION INTRAVENOUS; SUBCUTANEOUS ONCE
Qty: 0 | Refills: 0 | Status: COMPLETED | OUTPATIENT
Start: 2017-06-23 | End: 2017-06-23

## 2017-06-23 RX ORDER — HYDROMORPHONE HYDROCHLORIDE 2 MG/ML
2 INJECTION INTRAMUSCULAR; INTRAVENOUS; SUBCUTANEOUS ONCE
Qty: 0 | Refills: 0 | Status: DISCONTINUED | OUTPATIENT
Start: 2017-06-23 | End: 2017-06-23

## 2017-06-23 RX ADMIN — HYDROMORPHONE HYDROCHLORIDE 1 MILLIGRAM(S): 2 INJECTION INTRAMUSCULAR; INTRAVENOUS; SUBCUTANEOUS at 15:14

## 2017-06-23 RX ADMIN — HYDROMORPHONE HYDROCHLORIDE 1 MILLIGRAM(S): 2 INJECTION INTRAMUSCULAR; INTRAVENOUS; SUBCUTANEOUS at 10:52

## 2017-06-23 RX ADMIN — HYDROMORPHONE HYDROCHLORIDE 1 MILLIGRAM(S): 2 INJECTION INTRAMUSCULAR; INTRAVENOUS; SUBCUTANEOUS at 15:15

## 2017-06-23 RX ADMIN — HYDROMORPHONE HYDROCHLORIDE 2 MILLIGRAM(S): 2 INJECTION INTRAMUSCULAR; INTRAVENOUS; SUBCUTANEOUS at 02:27

## 2017-06-23 RX ADMIN — HYDROMORPHONE HYDROCHLORIDE 1 MILLIGRAM(S): 2 INJECTION INTRAMUSCULAR; INTRAVENOUS; SUBCUTANEOUS at 11:07

## 2017-06-23 RX ADMIN — HYDROMORPHONE HYDROCHLORIDE 2 MILLIGRAM(S): 2 INJECTION INTRAMUSCULAR; INTRAVENOUS; SUBCUTANEOUS at 03:30

## 2017-06-23 RX ADMIN — ERYTHROPOIETIN 10000 UNIT(S): 10000 INJECTION, SOLUTION INTRAVENOUS; SUBCUTANEOUS at 18:20

## 2017-06-23 NOTE — CONSULT NOTE ADULT - PROBLEM SELECTOR RECOMMENDATION 9
Patient is a 37 year old male with ESRD on HD M/W/F whom was last dialyzed 6/21.     P - dialysis today   Optiflux 200, , , 2K bath   goal EDW 85.5kg over 4 hours

## 2017-06-23 NOTE — PROGRESS NOTE ADULT - SUBJECTIVE AND OBJECTIVE BOX
Patient was seen and evaluated on dialysis.   Patient is tolerating the procedure well.   HR: 58  BP: 174/95  Continue dialysis:   Optiflux 200, , , 2K bath   goal EDW 85.5kg over 4 hours.

## 2017-06-23 NOTE — PROGRESS NOTE ADULT - SUBJECTIVE AND OBJECTIVE BOX
o/n: s/p RUE Brachiocephalic fistula with vein patch 6/22 for extended recovery; dressing changed once      37yoM s/p RUE Brachiocephalic fistula with vein patch 6/22    renal diet  pain control  HD today  d/c home 6/23

## 2017-06-23 NOTE — PROGRESS NOTE ADULT - ATTENDING COMMENTS
seen and evaluated while on dialysis   tolerating the procedure well  continue full treatment as prescribed    Right arm AVF + bruit--arm swollen
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
## 2017-06-23 NOTE — CONSULT NOTE ADULT - ASSESSMENT
Patient is a 37 year old male with ESRD on HD M/W/F and hypertension whom presented to the hospital for right brachiocephalic fistula placement.

## 2017-06-23 NOTE — CONSULT NOTE ADULT - RS GEN PE MLT RESP DETAILS PC
airway patent/clear to auscultation bilaterally/normal/good air movement/respirations non-labored/breath sounds equal
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
## 2017-06-26 DIAGNOSIS — Z86.718 PERSONAL HISTORY OF OTHER VENOUS THROMBOSIS AND EMBOLISM: ICD-10-CM

## 2017-06-26 DIAGNOSIS — E11.9 TYPE 2 DIABETES MELLITUS WITHOUT COMPLICATIONS: ICD-10-CM

## 2017-06-26 DIAGNOSIS — E78.5 HYPERLIPIDEMIA, UNSPECIFIED: ICD-10-CM

## 2017-06-26 DIAGNOSIS — N18.6 END STAGE RENAL DISEASE: ICD-10-CM

## 2017-06-26 DIAGNOSIS — F17.210 NICOTINE DEPENDENCE, CIGARETTES, UNCOMPLICATED: ICD-10-CM

## 2017-06-26 DIAGNOSIS — I12.0 HYPERTENSIVE CHRONIC KIDNEY DISEASE WITH STAGE 5 CHRONIC KIDNEY DISEASE OR END STAGE RENAL DISEASE: ICD-10-CM

## 2017-06-28 ENCOUNTER — RX RENEWAL (OUTPATIENT)
Age: 37
End: 2017-06-28

## 2017-07-21 ENCOUNTER — RX RENEWAL (OUTPATIENT)
Age: 37
End: 2017-07-21

## 2017-08-01 ENCOUNTER — RX RENEWAL (OUTPATIENT)
Age: 37
End: 2017-08-01

## 2017-08-02 RX ORDER — AMLODIPINE BESYLATE 10 MG/1
10 TABLET ORAL DAILY
Qty: 30 | Refills: 5 | Status: ACTIVE | COMMUNITY
Start: 2017-01-31 | End: 1900-01-01

## 2017-08-28 ENCOUNTER — MEDICATION RENEWAL (OUTPATIENT)
Age: 37
End: 2017-08-28

## 2017-10-31 ENCOUNTER — APPOINTMENT (OUTPATIENT)
Dept: VASCULAR SURGERY | Facility: CLINIC | Age: 37
End: 2017-10-31
Payer: MEDICARE

## 2017-10-31 VITALS — DIASTOLIC BLOOD PRESSURE: 85 MMHG | OXYGEN SATURATION: 99 % | HEART RATE: 65 BPM | SYSTOLIC BLOOD PRESSURE: 138 MMHG

## 2017-10-31 PROCEDURE — 99213 OFFICE O/P EST LOW 20 MIN: CPT

## 2017-11-13 ENCOUNTER — INPATIENT (INPATIENT)
Facility: HOSPITAL | Age: 37
LOS: 1 days | Discharge: HOME | DRG: 252 | End: 2017-11-15
Attending: SURGERY | Admitting: SURGERY
Payer: MEDICARE

## 2017-11-13 VITALS
WEIGHT: 188.94 LBS | RESPIRATION RATE: 18 BRPM | SYSTOLIC BLOOD PRESSURE: 160 MMHG | HEART RATE: 58 BPM | DIASTOLIC BLOOD PRESSURE: 93 MMHG | TEMPERATURE: 99 F | OXYGEN SATURATION: 97 %

## 2017-11-13 DIAGNOSIS — Z98.89 OTHER SPECIFIED POSTPROCEDURAL STATES: Chronic | ICD-10-CM

## 2017-11-13 DIAGNOSIS — N18.9 CHRONIC KIDNEY DISEASE, UNSPECIFIED: ICD-10-CM

## 2017-11-13 DIAGNOSIS — R10.9 UNSPECIFIED ABDOMINAL PAIN: ICD-10-CM

## 2017-11-13 DIAGNOSIS — I10 ESSENTIAL (PRIMARY) HYPERTENSION: ICD-10-CM

## 2017-11-13 DIAGNOSIS — N18.6 END STAGE RENAL DISEASE: ICD-10-CM
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
## 2017-11-13 DIAGNOSIS — I77.0 ARTERIOVENOUS FISTULA, ACQUIRED: ICD-10-CM

## 2017-11-13 LAB
ALBUMIN SERPL ELPH-MCNC: 4.4 G/DL — SIGNIFICANT CHANGE UP (ref 3.3–5)
ALP SERPL-CCNC: 41 U/L — SIGNIFICANT CHANGE UP (ref 40–120)
ALT FLD-CCNC: <5 U/L — LOW (ref 10–45)
ANION GAP SERPL CALC-SCNC: 18 MMOL/L — HIGH (ref 5–17)
APTT BLD: 42.9 SEC — HIGH (ref 27.5–37.4)
AST SERPL-CCNC: 12 U/L — SIGNIFICANT CHANGE UP (ref 10–40)
BILIRUB SERPL-MCNC: 0.8 MG/DL — SIGNIFICANT CHANGE UP (ref 0.2–1.2)
BUN SERPL-MCNC: 66 MG/DL — HIGH (ref 7–23)
CALCIUM SERPL-MCNC: 9.4 MG/DL — SIGNIFICANT CHANGE UP (ref 8.4–10.5)
CHLORIDE SERPL-SCNC: 92 MMOL/L — LOW (ref 96–108)
CO2 SERPL-SCNC: 27 MMOL/L — SIGNIFICANT CHANGE UP (ref 22–31)
CREAT SERPL-MCNC: 12.9 MG/DL — HIGH (ref 0.5–1.3)
GLUCOSE SERPL-MCNC: 86 MG/DL — SIGNIFICANT CHANGE UP (ref 70–99)
HCT VFR BLD CALC: 23.6 % — LOW (ref 39–50)
HGB BLD-MCNC: 7.7 G/DL — LOW (ref 13–17)
INR BLD: 1.17 — HIGH (ref 0.88–1.16)
MCHC RBC-ENTMCNC: 31.2 PG — SIGNIFICANT CHANGE UP (ref 27–34)
MCHC RBC-ENTMCNC: 32.6 G/DL — SIGNIFICANT CHANGE UP (ref 32–36)
MCV RBC AUTO: 95.5 FL — SIGNIFICANT CHANGE UP (ref 80–100)
PLATELET # BLD AUTO: 120 K/UL — LOW (ref 150–400)
POTASSIUM SERPL-MCNC: 5.2 MMOL/L — SIGNIFICANT CHANGE UP (ref 3.5–5.3)
POTASSIUM SERPL-SCNC: 5.2 MMOL/L — SIGNIFICANT CHANGE UP (ref 3.5–5.3)
PROT SERPL-MCNC: 7.5 G/DL — SIGNIFICANT CHANGE UP (ref 6–8.3)
PROTHROM AB SERPL-ACNC: 13 SEC — HIGH (ref 9.8–12.7)
RBC # BLD: 2.47 M/UL — LOW (ref 4.2–5.8)
RBC # FLD: 13.8 % — SIGNIFICANT CHANGE UP (ref 10.3–16.9)
SODIUM SERPL-SCNC: 137 MMOL/L — SIGNIFICANT CHANGE UP (ref 135–145)
WBC # BLD: 4.3 K/UL — SIGNIFICANT CHANGE UP (ref 3.8–10.5)
WBC # FLD AUTO: 4.3 K/UL — SIGNIFICANT CHANGE UP (ref 3.8–10.5)

## 2017-11-13 PROCEDURE — 99232 SBSQ HOSP IP/OBS MODERATE 35: CPT | Mod: GC

## 2017-11-13 PROCEDURE — 93010 ELECTROCARDIOGRAM REPORT: CPT

## 2017-11-13 PROCEDURE — 71020: CPT | Mod: 26

## 2017-11-13 PROCEDURE — 99285 EMERGENCY DEPT VISIT HI MDM: CPT | Mod: 25

## 2017-11-13 RX ORDER — OXYCODONE AND ACETAMINOPHEN 5; 325 MG/1; MG/1
1 TABLET ORAL ONCE
Qty: 0 | Refills: 0 | Status: DISCONTINUED | OUTPATIENT
Start: 2017-11-13 | End: 2017-11-13

## 2017-11-13 RX ADMIN — OXYCODONE AND ACETAMINOPHEN 1 TABLET(S): 5; 325 TABLET ORAL at 23:20

## 2017-11-13 NOTE — CONSULT NOTE ADULT - PROBLEM SELECTOR RECOMMENDATION 4
Will dose Epogen on HD when HD is reinstituted  Will also need to give Venofer as he is not iron replete on outpatient labs

## 2017-11-13 NOTE — H&P ADULT - NSHPPHYSICALEXAM_GEN_ALL_CORE
Vital Signs Last 24 Hrs  T(C): 36.8 (14 Nov 2017 01:23), Max: 37.2 (13 Nov 2017 20:18)  T(F): 98.3 (14 Nov 2017 01:23), Max: 99 (13 Nov 2017 20:18)  HR: 58 (14 Nov 2017 01:23) (58 - 58)  BP: 147/81 (14 Nov 2017 01:23) (147/81 - 160/93)  BP(mean): --  RR: 18 (14 Nov 2017 01:23) (18 - 18)  SpO2: 99% (14 Nov 2017 01:23) (97% - 99%)    General: NAD, Awake, alert  Pulm: No respiratory distress, unlabored breathing, CTA  Cards: RRR  Extremity: Warm extremities b/l, RUE AVF with pulsatile thrill, dialysis puncture site with bandage in place, no bleeding, able to move all fingers, sensation intact, palpable right radial and ulna pulses

## 2017-11-13 NOTE — CONSULT NOTE ADULT - ASSESSMENT
37M PMhx of ESRD on HD M/W/F since 11/2014 via RIGHT AVF (started use approximately 1.5 months prior, previously had a RIJ permacath and prior left brachiocephalic AVF), HTN, anemia of CKD, CKD-MBD, HLD, thoracoabdominal aortic aneurysm repair, steal syndrome associated with the AV fistula, prior PC associated bacteremia, who presents from [facility] dialysis with incomplete dialysis today due to AVF malfunction likely due to a venous outflow stenosis based on the clinical presenting history.

## 2017-11-13 NOTE — H&P ADULT - ASSESSMENT
Pt is a 36 y/o M with ESRD on HD (M/W/F - last full dialysis 11/10/17 ), malignant HTN, s/p Type A dissection repair with Dr. Moreno in 2015, pericardial window, sternal wound reconstruction with pec flap, and type B dissection repair at [facility] in 04/2016, LUE steal syndrome associated with the AV fistula, s/p Left AVF ligation and s/p Right brachiocephalic fistula with vein patch 6/22/17 now with bleeding from right upper AVF during dialysis    NPO after midnight  Home medication  Pre-op  OR tomorrow for fistulogram  F/u renal recs  F/u 2am labs
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
## 2017-11-13 NOTE — ED ADULT NURSE NOTE - OBJECTIVE STATEMENT
pt was in hemodialysis and his right arm graft was punctured and he was connected to the hemo machine and began bleeding and did not stop.  20 mins into treatment they pulled him off the machine.  pt arrives with no bleeding from graft site.  pt does c/o left upper abd pain radiating around back.  pt states this pain is chronic and he took his percocet and it did not help.

## 2017-11-13 NOTE — H&P ADULT - HISTORY OF PRESENT ILLNESS
Pt is a 36 y/o M with ESRD on HD (M/W/F - last full dialysis 11/10/17 ), malignant HTN, s/p Type A dissection repair with Dr. Moreno in 2015, pericardial window, sternal wound reconstruction with pec flap, and type B dissection repair at [facility] in 04/2016, LUE steal syndrome associated with the AV fistula, s/p Left AVF ligation and s/p Right brachiocephalic fistula with vein patch 6/22/17 who was sent to [facility] ED from dialysis due to bleeding from RUE AVF site. Pt only received 20minutes of dialysis today due to bleeding from both the arterial inflow and venous outflow needing sites during dialysis.  Bleeding could not be stopped with pressure and so dialysis was discontinued and pressure was held at bleeding site. Bleeding subsequently resolved and Pt was sent to [facility] ED to be pre-op for fistulogram tomorrow.  Pt admits to hand feeling numb but denies tingling, SOB, dizziness, nausea, vomiting,  fever, or chills.  Pt also c/o left abdominal, flank and back pain. Pt states that pain has been present since his last aortic aneurysm repair in 2016; pain is typically intermittent and he has been prescribed percocet for pain. Pt sees a pain management specialist for the pain.

## 2017-11-13 NOTE — ED PROVIDER NOTE - OBJECTIVE STATEMENT
37y male per records h/o ESRD on HD (M/W/F), malignant HTN, s/p Type A dissection repair with Dr. Moreno in 2015, pericardial window, sternal wound reconstruction with pec flap, and type B dissection repair at [facility] in 04/2016, recent admission in December 2016 for malfunctioning Left AVF, s/p AVF revision 12/22/16; in ED today as patient was unable to complete HD today 2/2 AV fistual malfunction - developed bleeding at the site, no current bleeding.

## 2017-11-13 NOTE — CONSULT NOTE ADULT - PROBLEM SELECTOR RECOMMENDATION 9
In light of incomplete HD, his labs may be anticipated to be deranged given his last HD would have been last Friday.     Check BMP.  The only parameter of importance tonight is the potassium.  If K < 6.0, then treatment only dictated by acute ECG changes  If K > 6.0, to contact renal fellow [phone] to discuss further strategies regarding medical management followed by IR for fistulogram / permacath in the AM versus need for temporary HD line and acute urgent HD tonight.     Volume status is hypervolemic but not in florid pulmonary edema     But patient will not be able to receive HD via AV fistula tonight.

## 2017-11-13 NOTE — CONSULT NOTE ADULT - ATTENDING COMMENTS
AVF with intractable oozing during HD --likely high pressures due to stenosis  comfortable, volume ok'  f/u labs  plan HD tomorrow -- ideally after fistulogram

## 2017-11-13 NOTE — CONSULT NOTE ADULT - NEGATIVE CARDIOVASCULAR SYMPTOMS
no palpitations/no dyspnea on exertion/no peripheral edema/no orthopnea/no paroxysmal nocturnal dyspnea/no chest pain

## 2017-11-13 NOTE — ED ADULT TRIAGE NOTE - CHIEF COMPLAINT QUOTE
bleeding from dialysis site  ( right arm) started 20 minds ago while having hemodialysis;   also with left lower abdominal pain started this morning (took 2 Percocet)

## 2017-11-13 NOTE — ED ADULT NURSE NOTE - FAMILY HISTORY
Family history of diabetes mellitus, mother.     No pertinent family history, No significant family history

## 2017-11-13 NOTE — CONSULT NOTE ADULT - PROBLEM SELECTOR RECOMMENDATION 2
His outpatient labs while using the AVF has had suboptimal clearances and high normal potassium. This was a sign there was likely recirculation occurring in the AV fistula and now the current presenting issues suggest.    Please consult vascular surgery Dr. Riley's team.   Anticipate need for IR fistulogram in AM  Keep NPO after MN in case fistulogram can be schedule (provided vascular surgery agrees with this course of action)

## 2017-11-13 NOTE — CONSULT NOTE ADULT - VASCULAR DETAILS
right brachiocephalic AVF positive thrill and bruit noted  No further evidence of oozing or bleeding noted right now

## 2017-11-13 NOTE — ED PROVIDER NOTE - MEDICAL DECISION MAKING DETAILS
37y male extensive PMH reviewed with malfunctioning AV fistula. 37y male with malfunctioning AV fistula. Vascular consulted, evaluated pt in ED, and recommends inpatient admission for further treatment.

## 2017-11-13 NOTE — CONSULT NOTE ADULT - PROBLEM SELECTOR RECOMMENDATION 3
BP is elevated but somewhat controlled right now without HD.   Would continue all of his prescribed medications as above as patient has demonstrated resistant hypertension.

## 2017-11-14 DIAGNOSIS — I77.0 ARTERIOVENOUS FISTULA, ACQUIRED: Chronic | ICD-10-CM

## 2017-11-14 LAB
ANION GAP SERPL CALC-SCNC: 19 MMOL/L — HIGH (ref 5–17)
APTT BLD: 45.4 SEC — HIGH (ref 27.5–37.4)
BUN SERPL-MCNC: 75 MG/DL — HIGH (ref 7–23)
CALCIUM SERPL-MCNC: 9.5 MG/DL — SIGNIFICANT CHANGE UP (ref 8.4–10.5)
CHLORIDE SERPL-SCNC: 93 MMOL/L — LOW (ref 96–108)
CO2 SERPL-SCNC: 27 MMOL/L — SIGNIFICANT CHANGE UP (ref 22–31)
CREAT SERPL-MCNC: 13.31 MG/DL — HIGH (ref 0.5–1.3)
GLUCOSE SERPL-MCNC: 117 MG/DL — HIGH (ref 70–99)
HCT VFR BLD CALC: 22.8 % — LOW (ref 39–50)
HGB BLD-MCNC: 7.5 G/DL — LOW (ref 13–17)
INR BLD: 1.19 — HIGH (ref 0.88–1.16)
MAGNESIUM SERPL-MCNC: 2.6 MG/DL — SIGNIFICANT CHANGE UP (ref 1.6–2.6)
MCHC RBC-ENTMCNC: 30.7 PG — SIGNIFICANT CHANGE UP (ref 27–34)
MCHC RBC-ENTMCNC: 32.9 G/DL — SIGNIFICANT CHANGE UP (ref 32–36)
MCV RBC AUTO: 93.4 FL — SIGNIFICANT CHANGE UP (ref 80–100)
PHOSPHATE SERPL-MCNC: 6.9 MG/DL — HIGH (ref 2.5–4.5)
PLATELET # BLD AUTO: 135 K/UL — LOW (ref 150–400)
POTASSIUM SERPL-MCNC: 5.4 MMOL/L — HIGH (ref 3.5–5.3)
POTASSIUM SERPL-SCNC: 5.4 MMOL/L — HIGH (ref 3.5–5.3)
PROTHROM AB SERPL-ACNC: 13.3 SEC — HIGH (ref 9.8–12.7)
RBC # BLD: 2.44 M/UL — LOW (ref 4.2–5.8)
RBC # FLD: 14.2 % — SIGNIFICANT CHANGE UP (ref 10.3–16.9)
SODIUM SERPL-SCNC: 139 MMOL/L — SIGNIFICANT CHANGE UP (ref 135–145)
WBC # BLD: 4.9 K/UL — SIGNIFICANT CHANGE UP (ref 3.8–10.5)
WBC # FLD AUTO: 4.9 K/UL — SIGNIFICANT CHANGE UP (ref 3.8–10.5)

## 2017-11-14 PROCEDURE — 90935 HEMODIALYSIS ONE EVALUATION: CPT | Mod: GC

## 2017-11-14 PROCEDURE — 36902 INTRO CATH DIALYSIS CIRCUIT: CPT | Mod: 78,GC

## 2017-11-14 RX ORDER — AMLODIPINE BESYLATE 2.5 MG/1
10 TABLET ORAL DAILY
Qty: 0 | Refills: 0 | Status: DISCONTINUED | OUTPATIENT
Start: 2017-11-14 | End: 2017-11-15

## 2017-11-14 RX ORDER — DOCUSATE SODIUM 100 MG
100 CAPSULE ORAL THREE TIMES A DAY
Qty: 0 | Refills: 0 | Status: DISCONTINUED | OUTPATIENT
Start: 2017-11-14 | End: 2017-11-15

## 2017-11-14 RX ORDER — ASPIRIN/CALCIUM CARB/MAGNESIUM 324 MG
81 TABLET ORAL DAILY
Qty: 0 | Refills: 0 | Status: DISCONTINUED | OUTPATIENT
Start: 2017-11-14 | End: 2017-11-15

## 2017-11-14 RX ORDER — MINOXIDIL 10 MG
10 TABLET ORAL THREE TIMES A DAY
Qty: 0 | Refills: 0 | Status: DISCONTINUED | OUTPATIENT
Start: 2017-11-14 | End: 2017-11-15

## 2017-11-14 RX ORDER — OXYCODONE AND ACETAMINOPHEN 5; 325 MG/1; MG/1
2 TABLET ORAL EVERY 4 HOURS
Qty: 0 | Refills: 0 | Status: DISCONTINUED | OUTPATIENT
Start: 2017-11-14 | End: 2017-11-15

## 2017-11-14 RX ORDER — ISOSORBIDE MONONITRATE 60 MG/1
60 TABLET, EXTENDED RELEASE ORAL DAILY
Qty: 0 | Refills: 0 | Status: DISCONTINUED | OUTPATIENT
Start: 2017-11-14 | End: 2017-11-15

## 2017-11-14 RX ORDER — SEVELAMER CARBONATE 2400 MG/1
1600 POWDER, FOR SUSPENSION ORAL
Qty: 0 | Refills: 0 | Status: DISCONTINUED | OUTPATIENT
Start: 2017-11-14 | End: 2017-11-15

## 2017-11-14 RX ORDER — LOSARTAN POTASSIUM 100 MG/1
100 TABLET, FILM COATED ORAL DAILY
Qty: 0 | Refills: 0 | Status: DISCONTINUED | OUTPATIENT
Start: 2017-11-14 | End: 2017-11-15

## 2017-11-14 RX ORDER — ATORVASTATIN CALCIUM 80 MG/1
10 TABLET, FILM COATED ORAL AT BEDTIME
Qty: 0 | Refills: 0 | Status: DISCONTINUED | OUTPATIENT
Start: 2017-11-14 | End: 2017-11-15

## 2017-11-14 RX ORDER — CARVEDILOL PHOSPHATE 80 MG/1
25 CAPSULE, EXTENDED RELEASE ORAL EVERY 12 HOURS
Qty: 0 | Refills: 0 | Status: DISCONTINUED | OUTPATIENT
Start: 2017-11-14 | End: 2017-11-15

## 2017-11-14 RX ORDER — OXYCODONE AND ACETAMINOPHEN 5; 325 MG/1; MG/1
1 TABLET ORAL ONCE
Qty: 0 | Refills: 0 | Status: DISCONTINUED | OUTPATIENT
Start: 2017-11-14 | End: 2017-11-15

## 2017-11-14 RX ORDER — ERYTHROPOIETIN 10000 [IU]/ML
10000 INJECTION, SOLUTION INTRAVENOUS; SUBCUTANEOUS ONCE
Qty: 0 | Refills: 0 | Status: COMPLETED | OUTPATIENT
Start: 2017-11-14 | End: 2017-11-14

## 2017-11-14 RX ORDER — OXYCODONE AND ACETAMINOPHEN 5; 325 MG/1; MG/1
1 TABLET ORAL EVERY 4 HOURS
Qty: 0 | Refills: 0 | Status: DISCONTINUED | OUTPATIENT
Start: 2017-11-14 | End: 2017-11-15

## 2017-11-14 RX ORDER — HEPARIN SODIUM 5000 [USP'U]/ML
5000 INJECTION INTRAVENOUS; SUBCUTANEOUS EVERY 8 HOURS
Qty: 0 | Refills: 0 | Status: DISCONTINUED | OUTPATIENT
Start: 2017-11-14 | End: 2017-11-15

## 2017-11-14 RX ORDER — SENNA PLUS 8.6 MG/1
2 TABLET ORAL AT BEDTIME
Qty: 0 | Refills: 0 | Status: DISCONTINUED | OUTPATIENT
Start: 2017-11-14 | End: 2017-11-15

## 2017-11-14 RX ORDER — DOXAZOSIN MESYLATE 4 MG
2 TABLET ORAL AT BEDTIME
Qty: 0 | Refills: 0 | Status: DISCONTINUED | OUTPATIENT
Start: 2017-11-14 | End: 2017-11-15

## 2017-11-14 RX ORDER — DIPHENHYDRAMINE HCL 50 MG
25 CAPSULE ORAL ONCE
Qty: 0 | Refills: 0 | Status: COMPLETED | OUTPATIENT
Start: 2017-11-14 | End: 2017-11-14

## 2017-11-14 RX ORDER — GABAPENTIN 400 MG/1
300 CAPSULE ORAL THREE TIMES A DAY
Qty: 0 | Refills: 0 | Status: DISCONTINUED | OUTPATIENT
Start: 2017-11-14 | End: 2017-11-15

## 2017-11-14 RX ORDER — ACETAMINOPHEN 500 MG
650 TABLET ORAL ONCE
Qty: 0 | Refills: 0 | Status: DISCONTINUED | OUTPATIENT
Start: 2017-11-14 | End: 2017-11-15

## 2017-11-14 RX ADMIN — Medication 100 MILLIGRAM(S): at 22:13

## 2017-11-14 RX ADMIN — HEPARIN SODIUM 5000 UNIT(S): 5000 INJECTION INTRAVENOUS; SUBCUTANEOUS at 06:16

## 2017-11-14 RX ADMIN — OXYCODONE AND ACETAMINOPHEN 2 TABLET(S): 5; 325 TABLET ORAL at 22:22

## 2017-11-14 RX ADMIN — HEPARIN SODIUM 5000 UNIT(S): 5000 INJECTION INTRAVENOUS; SUBCUTANEOUS at 22:23

## 2017-11-14 RX ADMIN — GABAPENTIN 300 MILLIGRAM(S): 400 CAPSULE ORAL at 06:16

## 2017-11-14 RX ADMIN — OXYCODONE AND ACETAMINOPHEN 2 TABLET(S): 5; 325 TABLET ORAL at 00:38

## 2017-11-14 RX ADMIN — OXYCODONE AND ACETAMINOPHEN 2 TABLET(S): 5; 325 TABLET ORAL at 23:10

## 2017-11-14 RX ADMIN — SENNA PLUS 2 TABLET(S): 8.6 TABLET ORAL at 22:11

## 2017-11-14 RX ADMIN — LOSARTAN POTASSIUM 100 MILLIGRAM(S): 100 TABLET, FILM COATED ORAL at 13:00

## 2017-11-14 RX ADMIN — Medication 2 MILLIGRAM(S): at 23:28

## 2017-11-14 RX ADMIN — Medication 10 MILLIGRAM(S): at 23:28

## 2017-11-14 RX ADMIN — ATORVASTATIN CALCIUM 10 MILLIGRAM(S): 80 TABLET, FILM COATED ORAL at 22:12

## 2017-11-14 RX ADMIN — CARVEDILOL PHOSPHATE 25 MILLIGRAM(S): 80 CAPSULE, EXTENDED RELEASE ORAL at 06:16

## 2017-11-14 RX ADMIN — OXYCODONE AND ACETAMINOPHEN 2 TABLET(S): 5; 325 TABLET ORAL at 06:20

## 2017-11-14 RX ADMIN — Medication 25 MILLIGRAM(S): at 01:09

## 2017-11-14 RX ADMIN — GABAPENTIN 300 MILLIGRAM(S): 400 CAPSULE ORAL at 22:11

## 2017-11-14 RX ADMIN — Medication 25 MILLIGRAM(S): at 23:02

## 2017-11-14 RX ADMIN — OXYCODONE AND ACETAMINOPHEN 2 TABLET(S): 5; 325 TABLET ORAL at 16:31

## 2017-11-14 RX ADMIN — SEVELAMER CARBONATE 1600 MILLIGRAM(S): 2400 POWDER, FOR SUSPENSION ORAL at 22:11

## 2017-11-14 RX ADMIN — OXYCODONE AND ACETAMINOPHEN 1 TABLET(S): 5; 325 TABLET ORAL at 00:00

## 2017-11-14 RX ADMIN — ERYTHROPOIETIN 10000 UNIT(S): 10000 INJECTION, SOLUTION INTRAVENOUS; SUBCUTANEOUS at 20:32

## 2017-11-14 RX ADMIN — OXYCODONE AND ACETAMINOPHEN 2 TABLET(S): 5; 325 TABLET ORAL at 07:16

## 2017-11-14 RX ADMIN — CARVEDILOL PHOSPHATE 25 MILLIGRAM(S): 80 CAPSULE, EXTENDED RELEASE ORAL at 22:12

## 2017-11-14 RX ADMIN — ISOSORBIDE MONONITRATE 60 MILLIGRAM(S): 60 TABLET, EXTENDED RELEASE ORAL at 13:00

## 2017-11-14 RX ADMIN — OXYCODONE AND ACETAMINOPHEN 2 TABLET(S): 5; 325 TABLET ORAL at 01:15

## 2017-11-14 RX ADMIN — AMLODIPINE BESYLATE 10 MILLIGRAM(S): 2.5 TABLET ORAL at 06:16

## 2017-11-14 RX ADMIN — Medication 100 MILLIGRAM(S): at 06:16

## 2017-11-14 NOTE — PROGRESS NOTE ADULT - SUBJECTIVE AND OBJECTIVE BOX
Procedure:  Surgeon:    S: Pt has no complaints. Denies CP, SOB, PEARSON, calf tenderness. Pain controlled with medication.    O:  T(C): 37.2 (11-14-17 @ 22:04), Max: 37.2 (11-14-17 @ 22:04)  T(F): 98.9 (11-14-17 @ 22:04), Max: 98.9 (11-14-17 @ 22:04)  HR: 78 (11-14-17 @ 21:45) (64 - 78)  BP: 157/87 (11-14-17 @ 21:45) (157/87 - 166/84)  RR: 16 (11-14-17 @ 21:45) (16 - 16)  SpO2: 98% (11-14-17 @ 21:45) (98% - 98%)  Wt(kg): --                        7.5    4.9   )-----------( 135      ( 14 Nov 2017 02:38 )             22.8     11-14    139  |  93<L>  |  75<H>  ----------------------------<  117<H>  5.4<H>   |  27  |  13.31<H>    Ca    9.5      14 Nov 2017 02:38  Phos  6.9     11-14  Mg     2.6     11-14    TPro  7.5  /  Alb  4.4  /  TBili  0.8  /  DBili  x   /  AST  12  /  ALT  <5<L>  /  AlkPhos  41  11-13      Gen: NAD, resting comfortably in bed  C/V: NSR  Pulm: Nonlabored breathing, no respiratory distress  Abd: soft, NT/ND Incision:  Extrem: WWP, no calf edema, SCDs in place      A/P: 37yMale s/p above procedure  Diet:  IVF:  Pain/nausea control  DVT ppx:  Dispo plan: Procedure: RUE Fistulogram with balloon plasty  Surgeon: Dr Ingram    S: Pt has no complaints. Denies CP, SOB, dizziness, numbness, tingling, nausea or vomiting.  Pain controlled with medication.    O:  T(C): 37.2 (11-14-17 @ 22:04), Max: 37.2 (11-14-17 @ 22:04)  T(F): 98.9 (11-14-17 @ 22:04), Max: 98.9 (11-14-17 @ 22:04)  HR: 78 (11-14-17 @ 21:45) (64 - 78)  BP: 157/87 (11-14-17 @ 21:45) (157/87 - 166/84)  RR: 16 (11-14-17 @ 21:45) (16 - 16)  SpO2: 98% (11-14-17 @ 21:45) (98% - 98%)  Wt(kg): --                        7.5    4.9   )-----------( 135      ( 14 Nov 2017 02:38 )             22.8     11-14    139  |  93<L>  |  75<H>  ----------------------------<  117<H>  5.4<H>   |  27  |  13.31<H>    Ca    9.5      14 Nov 2017 02:38  Phos  6.9     11-14  Mg     2.6     11-14    TPro  7.5  /  Alb  4.4  /  TBili  0.8  /  DBili  x   /  AST  12  /  ALT  <5<L>  /  AlkPhos  41  11-13      Gen: NAD, resting comfortably in bed  C/V: NSR  Pulm: Nonlabored breathing, no respiratory distress  Extrem: RUE AVF with a thrill, no swelling, no bleeding, able to move all fingers, sensation intact, palpable right radial       A/P: 37yMale s/p above procedure  Diet: Consistent Carb diet  Pain/nausea control  DVT ppx: SQH  Home medication

## 2017-11-14 NOTE — PROGRESS NOTE ADULT - SUBJECTIVE AND OBJECTIVE BOX
Patient was seen and evaluated on dialysis. at 6:10  s/p AVF angioplasties of stenoses  Patient is tolerating the procedure well. -- AVF working well w/o bleeding   HR: 78 (11-14-17 @ 21:45)  BP: 157/87 (11-14-17 @ 21:45)  Wt(kg): --  Continue dialysis  Dialyzer:     F180     QB:    400    QD: 700   Goal UF 4 kg over 4 Hours   may get PRBC with HD  monitor for bleeding after HD

## 2017-11-14 NOTE — PROGRESS NOTE ADULT - SUBJECTIVE AND OBJECTIVE BOX
24hr Events:  O/N: Admitted, started on home medication, NPO, pre-op for fistulogram, added to OR schedule, 2am K+ 5.4, hbg 7.5 from 7.7      Assessment/Plan:    NPO past midnight, except medications  2. Home medication  3. Pain control  4. F/u renal recs 24hr Events:  O/N: Admitted, started on home medication, NPO, pre-op for fistulogram, added to OR schedule, 2am K+ 5.4, hbg 7.5 from 7.7      Assessment/Plan:    1. NPO  2. Home medication  3. Pain control  4. F/u renal recs  5. OR today for fistulogram SUBJECTIVE: Patient seen and examined bedside by chief resident. O/N: Admitted, started on home medication, NPO, pre-op for fistulogram, added to OR schedule, 2am K+ 5.4, hbg 7.5 from 7.7      Vital Signs Last 24 Hrs  T(C): 36.7 (14 Nov 2017 06:28), Max: 37.2 (13 Nov 2017 20:18)  T(F): 98 (14 Nov 2017 06:28), Max: 99 (13 Nov 2017 20:18)  HR: 57 (14 Nov 2017 06:28) (57 - 58)  BP: 176/90 (14 Nov 2017 06:23) (147/81 - 176/90)  BP(mean): --  RR: 18 (14 Nov 2017 06:28) (18 - 18)  SpO2: 98% (14 Nov 2017 06:28) (97% - 99%)  I&O's Detail      General: NAD, resting comfortably in bed  Pulm: Nonlabored breathing, no respiratory distress  Extrem: WWP b/l, RUE AVF with pulsatile thrill, dialysis puncture site with bandage    LABS:                        7.5    4.9   )-----------( 135      ( 14 Nov 2017 02:38 )             22.8     11-14    139  |  93<L>  |  75<H>  ----------------------------<  117<H>  5.4<H>   |  27  |  13.31<H>    Ca    9.5      14 Nov 2017 02:38  Phos  6.9     11-14  Mg     2.6     11-14    TPro  7.5  /  Alb  4.4  /  TBili  0.8  /  DBili  x   /  AST  12  /  ALT  <5<L>  /  AlkPhos  41  11-13    PT/INR - ( 14 Nov 2017 02:38 )   PT: 13.3 sec;   INR: 1.19          PTT - ( 14 Nov 2017 02:38 )  PTT:45.4 sec      RADIOLOGY & ADDITIONAL STUDIES:

## 2017-11-14 NOTE — BRIEF OPERATIVE NOTE - PROCEDURE
<<-----Click on this checkbox to enter Procedure Fistulogram  11/14/2017  with balloon plasty to 8 mm  Active  SMARZBAN

## 2017-11-14 NOTE — PROGRESS NOTE ADULT - PROBLEM SELECTOR PLAN 5
Calcium 9.6 and phosphorus 6.9 at present.  Continue renagel for now.  Obtain Vitamin D 25, Vitamin D 1,25 and Intact PTH level.

## 2017-11-14 NOTE — PROGRESS NOTE ADULT - SUBJECTIVE AND OBJECTIVE BOX
Pre-op Diagnosis: ESRD with RUE AVF  Procedure: RUE fistulogram  Surgeon: Dr. Ingram    Consent in chart                          7.5    4.9   )-----------( 135      ( 14 Nov 2017 02:38 )             22.8     11-14    139  |  93<L>  |  75<H>  ----------------------------<  117<H>  5.4<H>   |  27  |  13.31<H>    Ca    9.5      14 Nov 2017 02:38  Phos  6.9     11-14  Mg     2.6     11-14    TPro  7.5  /  Alb  4.4  /  TBili  0.8  /  DBili  x   /  AST  12  /  ALT  <5<L>  /  AlkPhos  41  11-13    PT/INR - ( 14 Nov 2017 02:38 )   PT: 13.3 sec;   INR: 1.19     PTT - ( 14 Nov 2017 02:38 )  PTT:45.4 sec      Type & Screen: A+ Ab neg  CXR: Clear lungs; official read pending  EKG: NSR@57bpm    A/P: 37yMale planned for above procedure  1. NPO past midnight, except medications  2. Home medication  3. Pain control  4. [x ] Blood on hold, Units: 2units

## 2017-11-14 NOTE — PROGRESS NOTE ADULT - SUBJECTIVE AND OBJECTIVE BOX
Patient is a 37y Male seen and evaluated at bedside.       amLODIPine   Tablet 10 daily  aspirin  chewable 81 daily  atorvastatin 10 at bedtime  carvedilol 25 every 12 hours  docusate sodium 100 three times a day  doxazosin 2 at bedtime  gabapentin 300 three times a day  heparin  Injectable 5000 every 8 hours  isosorbide   mononitrate ER Tablet (IMDUR) 60 daily  losartan 100 daily  minoxidil 10 three times a day  Nephro-liudmila 1 daily  oxyCODONE    5 mG/acetaminophen 325 mG 2 every 4 hours PRN  oxyCODONE    5 mG/acetaminophen 325 mG 1 every 4 hours PRN  oxyCODONE    5 mG/acetaminophen 325 mG 1 once  senna 2 at bedtime  sevelamer hydrochloride 1600 three times a day with meals      Allergies    morphine (Other)  nitroglycerin (Anaphylaxis)  shellfish (Anaphylaxis)    Intolerances        T(C): , Max: 37.2 (11-13-17 @ 20:18)  T(F): , Max: 99 (11-13-17 @ 20:18)  HR: 57 (11-14-17 @ 06:28)  BP: 176/90 (11-14-17 @ 06:23)  BP(mean): --  RR: 18 (11-14-17 @ 06:28)  SpO2: 98% (11-14-17 @ 06:28)  Wt(kg): --    Height (cm): 180.34 (11-14 @ 06:28)  Weight (kg): 85.7 (11-14 @ 06:28)  BMI (kg/m2): 26.4 (11-14 @ 06:28)  BSA (m2): 2.06 (11-14 @ 06:28)    Review of Systems:  CONSTITUTIONAL: No fever or chills, No fatigue or tiredness.  EYES: No blurred or double vision.  RESPIRATORY: No shortness of breath, cough, hemoptysis  CARDIOVASCULAR: No Chest pain or shortness of breath  GASTROINTESTINAL: NO abdominal or flank pain, No nausea or vomiting, No diarrhea  GENITOURINARY: No dysuria or urinary burning, No difficulty passing urine, No hematuria  NEUROLOGICAL: No headaches or blurred vision  SKIN: No skin rashes   MUSCULOSKELETAL: No arthralgia, Joint pain, leg edema, No muscle pains      PHYSICAL EXAM:  GENERAL: NAD, well-developed, well nourished, alert, awake, no acute distress at present  HEAD:  Atraumatic, Normocephalic,   EYES: Bilateral conjuctiva and sclera normal   Oral cavity: Oral mucosa dry and pink  NECK: Neck supple, No JVD  CHEST/LUNG: Clear to auscultation bilaterally; No wheeze, no rales, no crepitations  HEART: Regular rate and rhythm. CINDY II/VI at LPSB, No gallop, no rub   ABDOMEN: Soft, Nontender, BS+nt, No flank tenderness.   EXTREMITIES: No clubbing, cyanosis, or edema  Neurology: AAOx3, no focal neurological deficit  SKIN: No rashes or lesions          ACCESS:     LABS:                        7.5    4.9   )-----------( 135      ( 14 Nov 2017 02:38 )             22.8     11-14    139  |  93<L>  |  75<H>  ----------------------------<  117<H>  5.4<H>   |  27  |  13.31<H>    Ca    9.5      14 Nov 2017 02:38  Phos  6.9     11-14  Mg     2.6     11-14    TPro  7.5  /  Alb  4.4  /  TBili  0.8  /  DBili  x   /  AST  12  /  ALT  <5<L>  /  AlkPhos  41  11-13      PT/INR - ( 14 Nov 2017 02:38 )   PT: 13.3 sec;   INR: 1.19          PTT - ( 14 Nov 2017 02:38 )  PTT:45.4 sec          RADIOLOGY & ADDITIONAL STUDIES: Patient is a 37y Male seen and evaluated at bedside. Patient lying in bed in no acute distress at present. Denies any chest pain, shortness of breath, palpitations, dizziness at present. Patient with RUE AV fistula dysfunction yesterday at his HD center.       amLODIPine   Tablet 10 daily  aspirin  chewable 81 daily  atorvastatin 10 at bedtime  carvedilol 25 every 12 hours  docusate sodium 100 three times a day  doxazosin 2 at bedtime  gabapentin 300 three times a day  heparin  Injectable 5000 every 8 hours  isosorbide   mononitrate ER Tablet (IMDUR) 60 daily  losartan 100 daily  minoxidil 10 three times a day  Nephro-liudmila 1 daily  oxyCODONE    5 mG/acetaminophen 325 mG 2 every 4 hours PRN  oxyCODONE    5 mG/acetaminophen 325 mG 1 every 4 hours PRN  oxyCODONE    5 mG/acetaminophen 325 mG 1 once  senna 2 at bedtime  sevelamer hydrochloride 1600 three times a day with meals      Allergies    morphine (Other)  nitroglycerin (Anaphylaxis)  shellfish (Anaphylaxis)    Intolerances        T(C): , Max: 37.2 (11-13-17 @ 20:18)  T(F): , Max: 99 (11-13-17 @ 20:18)  HR: 57 (11-14-17 @ 06:28)  BP: 176/90 (11-14-17 @ 06:23)  BP(mean): --  RR: 18 (11-14-17 @ 06:28)  SpO2: 98% (11-14-17 @ 06:28)  Wt(kg): --    Height (cm): 180.34 (11-14 @ 06:28)  Weight (kg): 85.7 (11-14 @ 06:28)  BMI (kg/m2): 26.4 (11-14 @ 06:28)  BSA (m2): 2.06 (11-14 @ 06:28)    Review of Systems:  CONSTITUTIONAL: No fever or chills, No fatigue or tiredness.  EYES: No blurred or double vision.  RESPIRATORY: No shortness of breath, cough, hemoptysis  CARDIOVASCULAR: No Chest pain or shortness of breath  GASTROINTESTINAL: NO abdominal or flank pain, No nausea or vomiting, No diarrhea  GENITOURINARY: No dysuria or urinary burning, No difficulty passing urine, No hematuria  NEUROLOGICAL: No headaches or blurred vision  SKIN: No skin rashes   MUSCULOSKELETAL: No arthralgia, Joint pain, leg edema, No muscle pains      PHYSICAL EXAM:  GENERAL: NAD, well-developed, well nourished, alert, awake, no acute distress at present  HEAD:  Atraumatic, Normocephalic,   EYES: Bilateral conjuctiva and sclera normal   Oral cavity: Oral mucosa dry and pink  NECK: Neck supple, No JVD  CHEST/LUNG: Clear to auscultation bilaterally; No wheeze, no rales, no crepitations  HEART: Regular rate and rhythm. CINDY II/VI at LPSB, No gallop, no rub   ABDOMEN: Soft, Nontender, BS+nt, No flank tenderness.   EXTREMITIES: No clubbing, cyanosis, or edema  Neurology: AAOx3, no focal neurological deficit  SKIN: No rashes or lesions          ACCESS:     LABS:                        7.5    4.9   )-----------( 135      ( 14 Nov 2017 02:38 )             22.8     11-14    139  |  93<L>  |  75<H>  ----------------------------<  117<H>  5.4<H>   |  27  |  13.31<H>    Ca    9.5      14 Nov 2017 02:38  Phos  6.9     11-14  Mg     2.6     11-14    TPro  7.5  /  Alb  4.4  /  TBili  0.8  /  DBili  x   /  AST  12  /  ALT  <5<L>  /  AlkPhos  41  11-13      PT/INR - ( 14 Nov 2017 02:38 )   PT: 13.3 sec;   INR: 1.19          PTT - ( 14 Nov 2017 02:38 )  PTT:45.4 sec          RADIOLOGY & ADDITIONAL STUDIES: Patient is a 37y Male seen and evaluated at bedside. Patient lying in bed in no acute distress at present. Denies any chest pain, shortness of breath, palpitations, dizziness at present. Patient with RUE AV fistula dysfunction yesterday at his HD center.       amLODIPine   Tablet 10 daily  aspirin  chewable 81 daily  atorvastatin 10 at bedtime  carvedilol 25 every 12 hours  docusate sodium 100 three times a day  doxazosin 2 at bedtime  gabapentin 300 three times a day  heparin  Injectable 5000 every 8 hours  isosorbide   mononitrate ER Tablet (IMDUR) 60 daily  losartan 100 daily  minoxidil 10 three times a day  Nephro-liudmila 1 daily  oxyCODONE    5 mG/acetaminophen 325 mG 2 every 4 hours PRN  oxyCODONE    5 mG/acetaminophen 325 mG 1 every 4 hours PRN  oxyCODONE    5 mG/acetaminophen 325 mG 1 once  senna 2 at bedtime  sevelamer hydrochloride 1600 three times a day with meals      Allergies    morphine (Other)  nitroglycerin (Anaphylaxis)  shellfish (Anaphylaxis)    Intolerances        T(C): , Max: 37.2 (11-13-17 @ 20:18)  T(F): , Max: 99 (11-13-17 @ 20:18)  HR: 57 (11-14-17 @ 06:28)  BP: 176/90 (11-14-17 @ 06:23)  BP(mean): --  RR: 18 (11-14-17 @ 06:28)  SpO2: 98% (11-14-17 @ 06:28)  Wt(kg): --    Height (cm): 180.34 (11-14 @ 06:28)  Weight (kg): 85.7 (11-14 @ 06:28)  BMI (kg/m2): 26.4 (11-14 @ 06:28)  BSA (m2): 2.06 (11-14 @ 06:28)    Review of Systems:  CONSTITUTIONAL: No fever or chills, No fatigue or tiredness.  EYES: No blurred or double vision.  RESPIRATORY: No shortness of breath, cough, hemoptysis  CARDIOVASCULAR: No Chest pain or shortness of breath  GASTROINTESTINAL: NO abdominal or flank pain, No nausea or vomiting, No diarrhea  GENITOURINARY: No dysuria or urinary burning, No difficulty passing urine, No hematuria  NEUROLOGICAL: No headaches or blurred vision  SKIN: No skin rashes   MUSCULOSKELETAL: No arthralgia, Joint pain, leg edema, No muscle pains      PHYSICAL EXAM:  GENERAL: NAD, well-developed, well nourished, alert, awake, no acute distress at present  HEAD:  Atraumatic, Normocephalic,   EYES: Bilateral conjuctiva and sclera normal   Oral cavity: Oral mucosa dry and pale  NECK: Neck supple, No JVD  CHEST/LUNG: Bilateral decreased breath sounds, Bibasilar crepitations+nt, no wheezing.   HEART: Regular rate and rhythm. CINDY II/VI at LPSB, No gallop, no rub   ABDOMEN: Soft, Nontender, BS+nt, No flank tenderness.   EXTREMITIES: No clubbing, cyanosis, or edema  Neurology: AAOx3, no focal neurological deficit  SKIN: No rashes or lesions          ACCESS: RUE AV fistula with thrill+nt, No bleedin    LABS:                        7.5    4.9   )-----------( 135      ( 14 Nov 2017 02:38 )             22.8     11-14    139  |  93<L>  |  75<H>  ----------------------------<  117<H>  5.4<H>   |  27  |  13.31<H>    Ca    9.5      14 Nov 2017 02:38  Phos  6.9     11-14  Mg     2.6     11-14    TPro  7.5  /  Alb  4.4  /  TBili  0.8  /  DBili  x   /  AST  12  /  ALT  <5<L>  /  AlkPhos  41  11-13      PT/INR - ( 14 Nov 2017 02:38 )   PT: 13.3 sec;   INR: 1.19          PTT - ( 14 Nov 2017 02:38 )  PTT:45.4 sec          RADIOLOGY & ADDITIONAL STUDIES:    < from: Xray Chest 2 Views PA/Lat (11.13.17 @ 21:52) >    EXAM:  XR CHEST PA - LAT                          PROCEDURE DATE:  11/13/2017                     INTERPRETATION:  Portable Chest X-Ray dated 11/13/2017 9:52 PM    Indication: r/o infiltrate    An AP portable view of the chest is compared to 1/13/2017. Cardiomegaly.   Mild pulmonary venous congestion, improved. Mediastinal clips. Clips in   the left axilla. No pleural effusions. No consolidation. Surgical clips   in the left upper abdominal quadrant.    IMPRESSION:  Cardiomegaly and mild pulmonary venous congestion consistent with CHF.            "Thank you for the opportunity to participate in the care of this   patient."        SUSAN MOORE M.D., ATTENDING RADIOLOGIST  This document has been electronically signed. Nov 14 2017  9:04AM                 < end of copied text >

## 2017-11-15 ENCOUNTER — TRANSCRIPTION ENCOUNTER (OUTPATIENT)
Age: 37
End: 2017-11-15

## 2017-11-15 ENCOUNTER — MEDICATION RENEWAL (OUTPATIENT)
Age: 37
End: 2017-11-15

## 2017-11-15 VITALS
HEART RATE: 88 BPM | DIASTOLIC BLOOD PRESSURE: 69 MMHG | OXYGEN SATURATION: 97 % | SYSTOLIC BLOOD PRESSURE: 119 MMHG | RESPIRATION RATE: 16 BRPM

## 2017-11-15 LAB
ANION GAP SERPL CALC-SCNC: 15 MMOL/L — SIGNIFICANT CHANGE UP (ref 5–17)
BUN SERPL-MCNC: 36 MG/DL — HIGH (ref 7–23)
CALCIUM SERPL-MCNC: 9.4 MG/DL — SIGNIFICANT CHANGE UP (ref 8.4–10.5)
CHLORIDE SERPL-SCNC: 95 MMOL/L — LOW (ref 96–108)
CO2 SERPL-SCNC: 27 MMOL/L — SIGNIFICANT CHANGE UP (ref 22–31)
CREAT SERPL-MCNC: 7.42 MG/DL — HIGH (ref 0.5–1.3)
GLUCOSE SERPL-MCNC: 99 MG/DL — SIGNIFICANT CHANGE UP (ref 70–99)
HCT VFR BLD CALC: 24.1 % — LOW (ref 39–50)
HGB BLD-MCNC: 8.2 G/DL — LOW (ref 13–17)
MCHC RBC-ENTMCNC: 31.4 PG — SIGNIFICANT CHANGE UP (ref 27–34)
MCHC RBC-ENTMCNC: 34 G/DL — SIGNIFICANT CHANGE UP (ref 32–36)
MCV RBC AUTO: 92.3 FL — SIGNIFICANT CHANGE UP (ref 80–100)
PLATELET # BLD AUTO: 116 K/UL — LOW (ref 150–400)
POTASSIUM SERPL-MCNC: 4 MMOL/L — SIGNIFICANT CHANGE UP (ref 3.5–5.3)
POTASSIUM SERPL-SCNC: 4 MMOL/L — SIGNIFICANT CHANGE UP (ref 3.5–5.3)
RBC # BLD: 2.61 M/UL — LOW (ref 4.2–5.8)
RBC # FLD: 15.1 % — SIGNIFICANT CHANGE UP (ref 10.3–16.9)
SODIUM SERPL-SCNC: 137 MMOL/L — SIGNIFICANT CHANGE UP (ref 135–145)
WBC # BLD: 4.8 K/UL — SIGNIFICANT CHANGE UP (ref 3.8–10.5)
WBC # FLD AUTO: 4.8 K/UL — SIGNIFICANT CHANGE UP (ref 3.8–10.5)

## 2017-11-15 PROCEDURE — 90935 HEMODIALYSIS ONE EVALUATION: CPT | Mod: GC

## 2017-11-15 RX ORDER — DIPHENHYDRAMINE HCL 50 MG
25 CAPSULE ORAL ONCE
Qty: 0 | Refills: 0 | Status: COMPLETED | OUTPATIENT
Start: 2017-11-15 | End: 2017-11-15

## 2017-11-15 RX ORDER — SENNA PLUS 8.6 MG/1
2 TABLET ORAL
Qty: 60 | Refills: 0 | OUTPATIENT
Start: 2017-11-15 | End: 2017-12-15

## 2017-11-15 RX ADMIN — Medication 100 MILLIGRAM(S): at 06:33

## 2017-11-15 RX ADMIN — Medication 1 TABLET(S): at 14:16

## 2017-11-15 RX ADMIN — Medication 25 MILLIGRAM(S): at 11:11

## 2017-11-15 RX ADMIN — HEPARIN SODIUM 5000 UNIT(S): 5000 INJECTION INTRAVENOUS; SUBCUTANEOUS at 06:32

## 2017-11-15 RX ADMIN — CARVEDILOL PHOSPHATE 25 MILLIGRAM(S): 80 CAPSULE, EXTENDED RELEASE ORAL at 06:32

## 2017-11-15 RX ADMIN — Medication 81 MILLIGRAM(S): at 14:16

## 2017-11-15 RX ADMIN — Medication 25 MILLIGRAM(S): at 03:40

## 2017-11-15 RX ADMIN — SEVELAMER CARBONATE 1600 MILLIGRAM(S): 2400 POWDER, FOR SUSPENSION ORAL at 07:51

## 2017-11-15 RX ADMIN — Medication 100 MILLIGRAM(S): at 14:16

## 2017-11-15 RX ADMIN — Medication 10 MILLIGRAM(S): at 06:32

## 2017-11-15 RX ADMIN — GABAPENTIN 300 MILLIGRAM(S): 400 CAPSULE ORAL at 14:16

## 2017-11-15 RX ADMIN — Medication 10 MILLIGRAM(S): at 14:16

## 2017-11-15 RX ADMIN — AMLODIPINE BESYLATE 10 MILLIGRAM(S): 2.5 TABLET ORAL at 06:32

## 2017-11-15 RX ADMIN — SEVELAMER CARBONATE 1600 MILLIGRAM(S): 2400 POWDER, FOR SUSPENSION ORAL at 14:16

## 2017-11-15 RX ADMIN — ISOSORBIDE MONONITRATE 60 MILLIGRAM(S): 60 TABLET, EXTENDED RELEASE ORAL at 14:16

## 2017-11-15 RX ADMIN — GABAPENTIN 300 MILLIGRAM(S): 400 CAPSULE ORAL at 06:32

## 2017-11-15 RX ADMIN — LOSARTAN POTASSIUM 100 MILLIGRAM(S): 100 TABLET, FILM COATED ORAL at 06:32

## 2017-11-15 NOTE — PROGRESS NOTE ADULT - PROBLEM SELECTOR PLAN 2
Patient with elevated blood pressure of 140 to 170's range due to missed HD treatment yesterday.  Chest xray with congestion and CHF  Will do UF adjustment during HD treatment today.
Fistulogram today for RUE non functioning fistula.

## 2017-11-15 NOTE — PROGRESS NOTE ADULT - PROBLEM SELECTOR PLAN 1
ESRD on HD (MWF) through right arm AV fistula  Last dialyzed on 11/14/2017 with UF 4.7 L  Will do short HD treatment today as per his schedule.   Low K/Low phos/renal diet.
ESRD on HD (MWF) through right arm AV fistula admitted with AVF non functioning.  Patient planned for Fistulogram today followed by HD treatment as he missed HD treatment yesterday.  Will do HD treatment as per zia.  Low K/Low phos/renal diet.

## 2017-11-15 NOTE — DISCHARGE NOTE ADULT - CARE PLAN
Principal Discharge DX:	Arteriovenous fistula occlusion, initial encounter  Goal:	Return to normal activity  Instructions for follow-up, activity and diet:	Follow up with  in 1-2 weeks. Call the office at  to schedule your appointment. You may shower; soap and water, over access site - no wounds or incisions. Pat dry when done. May resume HD via your RUE AVF. Ambulate as tolerated, but no heavy lifting (>10lbs) or strenuous exercise. You may resume renal diet.  Call the office if you experience increasing pain, hand numbness/tingling, difficulty with dialysis access, alarms going off, prolonged bleeding, fever/chills.

## 2017-11-15 NOTE — PROGRESS NOTE ADULT - PROBLEM SELECTOR PLAN 3
Anemia of chronic renal disease with hemoglobin 7.5 at present.  Transfuse PRBC as per primary team  EPO during HD next treatment   Check repeat Iron studies
Patient with elevated blood pressure of 170 to 180's range due to missed HD treatment yesterday.  Chest xray with congestion and CHF  Will do UF adjustment during HD treatment today.

## 2017-11-15 NOTE — DISCHARGE NOTE ADULT - HOSPITAL COURSE
38 y/o M with ESRD on HD (M/W/F), malignant HTN, s/p Type A dissection repair with Dr. Moreno in 2015, pericardial window, sternal wound reconstruction with pec flap, and type B dissection repair at [facility] in 04/2016, LUE steal syndrome associated with the AV fistula, s/p Left AVF ligation and s/p Right brachiocephalic fistula with vein patch 6/22/17 who was sent to [facility] ED from dialysis due to bleeding from RUE AVF site. Pt only received 20minutes of dialysis today due to bleeding from both the arterial inflow and venous outflow needing sites during dialysis.  Bleeding could not be stopped with pressure and so dialysis was discontinued and pressure was held at bleeding site. Bleeding subsequently resolved and Pt was sent to [facility] ED to be pre-op for fistulogram. On  11/14/17 patient underwent RUE AV fistulogram and fistuloplasty without complications. Post op patient was dialyzed successfully  via AVF and noted to have anemia with a Hgb of 7.5 for which he was transfused 1 unit. Today patient is feeling well, all the VS  are within normal limits, the pain is well controlled on oral pain medication, tolerating diet without nausea, and ambulating independently - patient is stable and ready for discharge today.

## 2017-11-15 NOTE — PROGRESS NOTE ADULT - ATTENDING COMMENTS
seen on HD with Dr Dumont, agree with above  tolerating rx-- cont HD as above  EPO
HD today to put back on schedule  EPO
HD today after fistulogram for clearance and UF

## 2017-11-15 NOTE — PROGRESS NOTE ADULT - SUBJECTIVE AND OBJECTIVE BOX
O/N: TATE, POC WNL, dialized via RUE fistula successfully  11/14: OR for RUE fistulotomy      Assessment/Plan:  37M ESRD HD MWF here for right fistulogram due to bleeding at fistula site s/p RUE fistulogram and plasty    1. Renal diet  2. Home medication  3. Pain control  4. F/u renal recs  5. Possible discharge today SUBJECTIVE: Patient seen and examined bedside by chief resident. O/N: TATE, POC WNL, dialized via RUE fistula successfully    Vital Signs Last 24 Hrs  T(C): 37 (15 Nov 2017 09:02), Max: 37.5 (15 Nov 2017 00:33)  T(F): 98.6 (15 Nov 2017 09:02), Max: 99.5 (15 Nov 2017 00:33)  HR: 66 (15 Nov 2017 09:07) (53 - 78)  BP: 118/75 (15 Nov 2017 09:07) (118/75 - 190/93)  BP(mean): --  RR: 16 (15 Nov 2017 09:07) (16 - 18)  SpO2: 97% (15 Nov 2017 09:07) (97% - 98%)  I&O's Detail    14 Nov 2017 07:01  -  15 Nov 2017 07:00  --------------------------------------------------------  IN:    Oral Fluid: 300 mL  Total IN: 300 mL    OUT:    Other: 5000 mL  Total OUT: 5000 mL    Total NET: -4700 mL      15 Nov 2017 07:01  -  15 Nov 2017 12:22  --------------------------------------------------------  IN:    Oral Fluid: 330 mL  Total IN: 330 mL    OUT:  Total OUT: 0 mL    Total NET: 330 mL          General: NAD, resting comfortably in bed  Pulm: Nonlabored breathing, no respiratory distress  Extrem: RUE AVF with a thrill, no swelling, no bleeding, able to move all fingers, sensation intact, palpable right radial         LABS:                        8.2    4.8   )-----------( 116      ( 15 Nov 2017 10:32 )             24.1     11-15    137  |  95<L>  |  36<H>  ----------------------------<  99  4.0   |  27  |  7.42<H>    Ca    9.4      15 Nov 2017 10:31  Phos  6.9     11-14  Mg     2.6     11-14    TPro  7.5  /  Alb  4.4  /  TBili  0.8  /  DBili  x   /  AST  12  /  ALT  <5<L>  /  AlkPhos  41  11-13    PT/INR - ( 14 Nov 2017 02:38 )   PT: 13.3 sec;   INR: 1.19          PTT - ( 14 Nov 2017 02:38 )  PTT:45.4 sec      RADIOLOGY & ADDITIONAL STUDIES:

## 2017-11-15 NOTE — PROGRESS NOTE ADULT - SUBJECTIVE AND OBJECTIVE BOX
Patient was seen and evaluated on dialysis.   Patient is tolerating the procedure well.   HR: 66 (11-15-17 @ 09:07)  BP: 118/75 (11-15-17 @ 09:07)  Continue dialysis:   Dialyzer: 180 Optiflux         QB:   400      QD: 700   2K+  Goal UF 2.5 to 3 Kg over 3 Hours      BMp and CBC prior to HD treatment.

## 2017-11-15 NOTE — DISCHARGE NOTE ADULT - CARE PROVIDER_API CALL
Anil Ingram), Surgery; Vascular Surgery  [address]  [address]  [place], [place] [address]  Phone: [phone]  Fax: [phone]

## 2017-11-15 NOTE — PROGRESS NOTE ADULT - PROBLEM SELECTOR PLAN 4
Calcium 9.6 and phosphorus 6.9 at present.  Continue renagel for now.  Obtain Vitamin D 25, Vitamin D 1,25 and Intact PTH level.
Anemia of chronic renal disease with hemoglobin 7.5 at present.  Transfuse PRBC as per primary team  EPO during HD treatment   Check repeat Iron studies

## 2017-11-15 NOTE — DISCHARGE NOTE ADULT - PLAN OF CARE
Follow up with  in 1-2 weeks. Call the office at  to schedule your appointment. You may shower; soap and water, over access site - no wounds or incisions. Pat dry when done. May resume HD via your RUE AVF. Ambulate as tolerated, but no heavy lifting (>10lbs) or strenuous exercise. You may resume renal diet.  Call the office if you experience increasing pain, hand numbness/tingling, difficulty with dialysis access, alarms going off, prolonged bleeding, fever/chills. Return to normal activity

## 2017-11-15 NOTE — DISCHARGE NOTE ADULT - PATIENT PORTAL LINK FT
“You can access the [employer] Patient Portal, offered by [facility], by registering with the following website: [email or web address]”

## 2017-11-15 NOTE — PROGRESS NOTE ADULT - SUBJECTIVE AND OBJECTIVE BOX
Patient is a 37y Male seen and evaluated at bedside. Patient lying in bed in no acute distress at present. Denies any chest pain, shortness of breath at present. Interval fixation of RUE AV fistula.       acetaminophen   Tablet. 650 once PRN  amLODIPine   Tablet 10 daily  aspirin  chewable 81 daily  atorvastatin 10 at bedtime  carvedilol 25 every 12 hours  docusate sodium 100 three times a day  doxazosin 2 at bedtime  gabapentin 300 three times a day  heparin  Injectable 5000 every 8 hours  isosorbide   mononitrate ER Tablet (IMDUR) 60 daily  losartan 100 daily  minoxidil 10 three times a day  Nephro-liudmila 1 daily  oxyCODONE    5 mG/acetaminophen 325 mG 2 every 4 hours PRN  oxyCODONE    5 mG/acetaminophen 325 mG 1 every 4 hours PRN  oxyCODONE    5 mG/acetaminophen 325 mG 1 once  senna 2 at bedtime  sevelamer hydrochloride 1600 three times a day with meals      Allergies    morphine (Other)  nitroglycerin (Anaphylaxis)  shellfish (Anaphylaxis)    Intolerances        T(C): , Max: 37.5 (11-15-17 @ 00:33)  T(F): , Max: 99.5 (11-15-17 @ 00:33)  HR: 66 (11-15-17 @ 09:07)  BP: 118/75 (11-15-17 @ 09:07)  BP(mean): --  RR: 16 (11-15-17 @ 09:07)  SpO2: 97% (11-15-17 @ 09:07)  Wt(kg): --    11-14 @ 07:01  -  11-15 @ 07:00  --------------------------------------------------------  IN: 300 mL / OUT: 5000 mL / NET: -4700 mL    11-15 @ 07:01  -  11-15 @ 09:31  --------------------------------------------------------  IN: 330 mL / OUT: 0 mL / NET: 330 mL          Review of Systems:  CONSTITUTIONAL: No fever or chills, No fatigue or tiredness.  EYES: No blurred or double vision.  RESPIRATORY: No shortness of breath, cough, hemoptysis  CARDIOVASCULAR: No Chest pain or shortness of breath  GASTROINTESTINAL: NO abdominal or flank pain, No nausea or vomiting, No diarrhea  GENITOURINARY: No dysuria or urinary burning, No difficulty passing urine, No hematuria  NEUROLOGICAL: No headaches or blurred vision  SKIN: No skin rashes   MUSCULOSKELETAL: No arthralgia, Joint pain, leg edema, No muscle pains      PHYSICAL EXAM:  GENERAL: NAD, well-developed, well nourished, alert, awake, no acute distress at present  HEAD:  Atraumatic, Normocephalic,   EYES: Bilateral conjuctiva and sclera normal   Oral cavity: Oral mucosa dry and pale  NECK: Neck supple, No JVD  CHEST/LUNG: Clear to auscultation bilaterally; No wheeze, no rales, no crepitations  HEART: Regular rate and rhythm. CINDY II/VI at LPSB, No gallop, no rub   ABDOMEN: Soft, Nontender, BS+nt, No flank tenderness.   EXTREMITIES: No clubbing, cyanosis, or edema  Neurology: AAOx3, no focal neurological deficit  SKIN: No rashes or lesions          ACCESS: RUE AV fistula+nt, No bleeding or hematoma.    LABS:                        7.5    4.9   )-----------( 135      ( 14 Nov 2017 02:38 )             22.8     11-14    139  |  93<L>  |  75<H>  ----------------------------<  117<H>  5.4<H>   |  27  |  13.31<H>    Ca    9.5      14 Nov 2017 02:38  Phos  6.9     11-14  Mg     2.6     11-14    TPro  7.5  /  Alb  4.4  /  TBili  0.8  /  DBili  x   /  AST  12  /  ALT  <5<L>  /  AlkPhos  41  11-13      PT/INR - ( 14 Nov 2017 02:38 )   PT: 13.3 sec;   INR: 1.19          PTT - ( 14 Nov 2017 02:38 )  PTT:45.4 sec          RADIOLOGY & ADDITIONAL STUDIES:  < from: Xray Chest 2 Views PA/Lat (11.13.17 @ 21:52) >    EXAM:  XR CHEST PA - LAT                          PROCEDURE DATE:  11/13/2017                     INTERPRETATION:  Portable Chest X-Ray dated 11/13/2017 9:52 PM    Indication: r/o infiltrate    An AP portable view of the chest is compared to 1/13/2017. Cardiomegaly.   Mild pulmonary venous congestion, improved. Mediastinal clips. Clips in   the left axilla. No pleural effusions. No consolidation. Surgical clips   in the left upper abdominal quadrant.    IMPRESSION:  Cardiomegaly and mild pulmonary venous congestion consistent with CHF.            "Thank you for the opportunity to participate in the care of this   patient."        SUSAN MOORE M.D., ATTENDING RADIOLOGIST  This document has been electronically signed. Nov 14 2017  9:04AM                 < end of copied text >

## 2017-11-15 NOTE — PROGRESS NOTE ADULT - ASSESSMENT
37M PMhx of ESRD on HD M/W/F since 11/2014 via RIGHT AVF (started use approximately 1.5 months prior, previously had a RIJ permacath and prior left brachiocephalic AVF), HTN, anemia of CKD, CKD-MBD, HLD, thoracoabdominal aortic aneurysm repair, steal syndrome associated with the AV fistula, prior PC associated bacteremia, who presents from [facility] side dialysis with incomplete dialysis today due to AVF malfunction likely due to a venous outflow stenosis based on the clinical presenting history.  Patient underwent fistulogram followed by HD teratment yesterday with 4.7 L UF.
37M ESRD HD MWF here for right fistulogram due to bleeding at fistula site s/p RUE fistulogram and plasty    1. Renal diet  2. Home medication  3. Pain control  4. F/u renal recs  5. discharge today
37M PMhx of ESRD on HD M/W/F since 11/2014 via RIGHT AVF (started use approximately 1.5 months prior, previously had a RIJ permacath and prior left brachiocephalic AVF), HTN, anemia of CKD, CKD-MBD, HLD, thoracoabdominal aortic aneurysm repair, steal syndrome associated with the AV fistula, prior PC associated bacteremia, who presents from [facility] dialysis with incomplete dialysis today due to AVF malfunction likely due to a venous outflow stenosis based on the clinical presenting history.
37M ESRD HD MWF here for right fistulogram s/p bleeding at fistula site    1. NPO  2. Home medication  3. Pain control  4. F/u renal recs  5. OR today for fistulogram

## 2017-11-20 DIAGNOSIS — Z98.890 OTHER SPECIFIED POSTPROCEDURAL STATES: ICD-10-CM

## 2017-11-20 DIAGNOSIS — R10.9 UNSPECIFIED ABDOMINAL PAIN: ICD-10-CM

## 2017-11-20 DIAGNOSIS — I99.8 OTHER DISORDER OF CIRCULATORY SYSTEM: ICD-10-CM

## 2017-11-20 DIAGNOSIS — T82.858A STENOSIS OF OTHER VASCULAR PROSTHETIC DEVICES, IMPLANTS AND GRAFTS, INITIAL ENCOUNTER: ICD-10-CM

## 2017-11-20 DIAGNOSIS — Z79.82 LONG TERM (CURRENT) USE OF ASPIRIN: ICD-10-CM

## 2017-11-20 DIAGNOSIS — Z99.2 DEPENDENCE ON RENAL DIALYSIS: ICD-10-CM

## 2017-11-20 DIAGNOSIS — I71.2 THORACIC AORTIC ANEURYSM, WITHOUT RUPTURE: ICD-10-CM

## 2017-11-20 DIAGNOSIS — I12.0 HYPERTENSIVE CHRONIC KIDNEY DISEASE WITH STAGE 5 CHRONIC KIDNEY DISEASE OR END STAGE RENAL DISEASE: ICD-10-CM

## 2017-11-20 DIAGNOSIS — Z91.013 ALLERGY TO SEAFOOD: ICD-10-CM

## 2017-11-20 DIAGNOSIS — I77.0 ARTERIOVENOUS FISTULA, ACQUIRED: ICD-10-CM

## 2017-11-20 DIAGNOSIS — E87.70 FLUID OVERLOAD, UNSPECIFIED: ICD-10-CM

## 2017-11-20 DIAGNOSIS — Z83.3 FAMILY HISTORY OF DIABETES MELLITUS: ICD-10-CM

## 2017-11-20 DIAGNOSIS — D63.1 ANEMIA IN CHRONIC KIDNEY DISEASE: ICD-10-CM

## 2017-11-20 DIAGNOSIS — N25.0 RENAL OSTEODYSTROPHY: ICD-10-CM

## 2017-11-20 DIAGNOSIS — E78.5 HYPERLIPIDEMIA, UNSPECIFIED: ICD-10-CM

## 2017-11-20 DIAGNOSIS — Z88.8 ALLERGY STATUS TO OTHER DRUGS, MEDICAMENTS AND BIOLOGICAL SUBSTANCES: ICD-10-CM

## 2017-11-20 DIAGNOSIS — Z88.5 ALLERGY STATUS TO NARCOTIC AGENT: ICD-10-CM

## 2017-11-20 DIAGNOSIS — N18.6 END STAGE RENAL DISEASE: ICD-10-CM

## 2017-11-26 ENCOUNTER — INPATIENT (INPATIENT)
Facility: HOSPITAL | Age: 37
LOS: 1 days | Discharge: HOME | DRG: 314 | End: 2017-11-28
Attending: SURGERY | Admitting: SURGERY
Payer: MEDICARE

## 2017-11-26 VITALS
HEIGHT: 71 IN | DIASTOLIC BLOOD PRESSURE: 85 MMHG | OXYGEN SATURATION: 98 % | SYSTOLIC BLOOD PRESSURE: 165 MMHG | RESPIRATION RATE: 18 BRPM | TEMPERATURE: 99 F | HEART RATE: 54 BPM | WEIGHT: 188.94 LBS

## 2017-11-26 DIAGNOSIS — Z98.89 OTHER SPECIFIED POSTPROCEDURAL STATES: Chronic | ICD-10-CM

## 2017-11-26 DIAGNOSIS — I77.0 ARTERIOVENOUS FISTULA, ACQUIRED: Chronic | ICD-10-CM

## 2017-11-26 LAB
ALBUMIN SERPL ELPH-MCNC: 5.1 G/DL — HIGH (ref 3.3–5)
ALP SERPL-CCNC: 46 U/L — SIGNIFICANT CHANGE UP (ref 40–120)
ALT FLD-CCNC: 8 U/L — LOW (ref 10–45)
ANION GAP SERPL CALC-SCNC: 21 MMOL/L — HIGH (ref 5–17)
APTT BLD: 46.2 SEC — HIGH (ref 27.5–37.4)
AST SERPL-CCNC: 16 U/L — SIGNIFICANT CHANGE UP (ref 10–40)
BILIRUB SERPL-MCNC: 1.2 MG/DL — SIGNIFICANT CHANGE UP (ref 0.2–1.2)
BLD GP AB SCN SERPL QL: NEGATIVE — SIGNIFICANT CHANGE UP
BUN SERPL-MCNC: 76 MG/DL — HIGH (ref 7–23)
CALCIUM SERPL-MCNC: 9.1 MG/DL — SIGNIFICANT CHANGE UP (ref 8.4–10.5)
CHLORIDE SERPL-SCNC: 94 MMOL/L — LOW (ref 96–108)
CO2 SERPL-SCNC: 25 MMOL/L — SIGNIFICANT CHANGE UP (ref 22–31)
CREAT SERPL-MCNC: 11.23 MG/DL — HIGH (ref 0.5–1.3)
GLUCOSE SERPL-MCNC: 91 MG/DL — SIGNIFICANT CHANGE UP (ref 70–99)
HCT VFR BLD CALC: 27.5 % — LOW (ref 39–50)
HGB BLD-MCNC: 8.7 G/DL — LOW (ref 13–17)
INR BLD: 1.17 — HIGH (ref 0.88–1.16)
MCHC RBC-ENTMCNC: 30.9 PG — SIGNIFICANT CHANGE UP (ref 27–34)
MCHC RBC-ENTMCNC: 31.6 G/DL — LOW (ref 32–36)
MCV RBC AUTO: 97.5 FL — SIGNIFICANT CHANGE UP (ref 80–100)
PLATELET # BLD AUTO: 104 K/UL — LOW (ref 150–400)
POTASSIUM SERPL-MCNC: 5.2 MMOL/L — SIGNIFICANT CHANGE UP (ref 3.5–5.3)
POTASSIUM SERPL-SCNC: 5.2 MMOL/L — SIGNIFICANT CHANGE UP (ref 3.5–5.3)
PROT SERPL-MCNC: 8.1 G/DL — SIGNIFICANT CHANGE UP (ref 6–8.3)
PROTHROM AB SERPL-ACNC: 13 SEC — HIGH (ref 9.8–12.7)
RAPID RVP RESULT: SIGNIFICANT CHANGE UP
RBC # BLD: 2.82 M/UL — LOW (ref 4.2–5.8)
RBC # FLD: 15.3 % — SIGNIFICANT CHANGE UP (ref 10.3–16.9)
RH IG SCN BLD-IMP: POSITIVE — SIGNIFICANT CHANGE UP
SODIUM SERPL-SCNC: 140 MMOL/L — SIGNIFICANT CHANGE UP (ref 135–145)
WBC # BLD: 3.8 K/UL — SIGNIFICANT CHANGE UP (ref 3.8–10.5)
WBC # FLD AUTO: 3.8 K/UL — SIGNIFICANT CHANGE UP (ref 3.8–10.5)

## 2017-11-26 PROCEDURE — 99285 EMERGENCY DEPT VISIT HI MDM: CPT

## 2017-11-26 PROCEDURE — 71020: CPT | Mod: 26

## 2017-11-26 RX ORDER — HYDROMORPHONE HYDROCHLORIDE 2 MG/ML
0.5 INJECTION INTRAMUSCULAR; INTRAVENOUS; SUBCUTANEOUS ONCE
Qty: 0 | Refills: 0 | Status: DISCONTINUED | OUTPATIENT
Start: 2017-11-26 | End: 2017-11-26

## 2017-11-26 RX ORDER — AMLODIPINE BESYLATE 2.5 MG/1
10 TABLET ORAL DAILY
Qty: 0 | Refills: 0 | Status: DISCONTINUED | OUTPATIENT
Start: 2017-11-26 | End: 2017-11-27

## 2017-11-26 RX ORDER — FAMOTIDINE 10 MG/ML
20 INJECTION INTRAVENOUS DAILY
Qty: 0 | Refills: 0 | Status: DISCONTINUED | OUTPATIENT
Start: 2017-11-26 | End: 2017-11-28

## 2017-11-26 RX ORDER — HEPARIN SODIUM 5000 [USP'U]/ML
5000 INJECTION INTRAVENOUS; SUBCUTANEOUS EVERY 8 HOURS
Qty: 0 | Refills: 0 | Status: DISCONTINUED | OUTPATIENT
Start: 2017-11-26 | End: 2017-11-28

## 2017-11-26 RX ORDER — HYDRALAZINE HCL 50 MG
25 TABLET ORAL
Qty: 0 | Refills: 0 | Status: DISCONTINUED | OUTPATIENT
Start: 2017-11-26 | End: 2017-11-27

## 2017-11-26 RX ORDER — LOSARTAN POTASSIUM 100 MG/1
100 TABLET, FILM COATED ORAL DAILY
Qty: 0 | Refills: 0 | Status: DISCONTINUED | OUTPATIENT
Start: 2017-11-26 | End: 2017-11-27

## 2017-11-26 RX ORDER — ALBUTEROL 90 UG/1
2.5 AEROSOL, METERED ORAL EVERY 6 HOURS
Qty: 0 | Refills: 0 | Status: DISCONTINUED | OUTPATIENT
Start: 2017-11-26 | End: 2017-11-28

## 2017-11-26 RX ORDER — IPRATROPIUM/ALBUTEROL SULFATE 18-103MCG
3 AEROSOL WITH ADAPTER (GRAM) INHALATION ONCE
Qty: 0 | Refills: 0 | Status: COMPLETED | OUTPATIENT
Start: 2017-11-26 | End: 2017-11-26

## 2017-11-26 RX ORDER — ASPIRIN/CALCIUM CARB/MAGNESIUM 324 MG
81 TABLET ORAL DAILY
Qty: 0 | Refills: 0 | Status: DISCONTINUED | OUTPATIENT
Start: 2017-11-26 | End: 2017-11-28

## 2017-11-26 RX ORDER — GABAPENTIN 400 MG/1
300 CAPSULE ORAL DAILY
Qty: 0 | Refills: 0 | Status: DISCONTINUED | OUTPATIENT
Start: 2017-11-26 | End: 2017-11-28

## 2017-11-26 RX ORDER — INFLUENZA VIRUS VACCINE 15; 15; 15; 15 UG/.5ML; UG/.5ML; UG/.5ML; UG/.5ML
0.5 SUSPENSION INTRAMUSCULAR ONCE
Qty: 0 | Refills: 0 | Status: COMPLETED | OUTPATIENT
Start: 2017-11-26 | End: 2017-11-26

## 2017-11-26 RX ORDER — CEFEPIME 1 G/1
1000 INJECTION, POWDER, FOR SOLUTION INTRAMUSCULAR; INTRAVENOUS ONCE
Qty: 0 | Refills: 0 | Status: COMPLETED | OUTPATIENT
Start: 2017-11-26 | End: 2017-11-26

## 2017-11-26 RX ORDER — ATORVASTATIN CALCIUM 80 MG/1
10 TABLET, FILM COATED ORAL AT BEDTIME
Qty: 0 | Refills: 0 | Status: DISCONTINUED | OUTPATIENT
Start: 2017-11-26 | End: 2017-11-28

## 2017-11-26 RX ORDER — OXYCODONE AND ACETAMINOPHEN 5; 325 MG/1; MG/1
1 TABLET ORAL EVERY 4 HOURS
Qty: 0 | Refills: 0 | Status: DISCONTINUED | OUTPATIENT
Start: 2017-11-26 | End: 2017-11-27

## 2017-11-26 RX ORDER — SEVELAMER CARBONATE 2400 MG/1
1600 POWDER, FOR SUSPENSION ORAL
Qty: 0 | Refills: 0 | Status: DISCONTINUED | OUTPATIENT
Start: 2017-11-26 | End: 2017-11-28

## 2017-11-26 RX ORDER — DIPHENHYDRAMINE HCL 50 MG
25 CAPSULE ORAL EVERY 6 HOURS
Qty: 0 | Refills: 0 | Status: DISCONTINUED | OUTPATIENT
Start: 2017-11-26 | End: 2017-11-28

## 2017-11-26 RX ORDER — CARVEDILOL PHOSPHATE 80 MG/1
25 CAPSULE, EXTENDED RELEASE ORAL EVERY 12 HOURS
Qty: 0 | Refills: 0 | Status: DISCONTINUED | OUTPATIENT
Start: 2017-11-26 | End: 2017-11-27

## 2017-11-26 RX ORDER — DOCUSATE SODIUM 100 MG
100 CAPSULE ORAL THREE TIMES A DAY
Qty: 0 | Refills: 0 | Status: DISCONTINUED | OUTPATIENT
Start: 2017-11-26 | End: 2017-11-28

## 2017-11-26 RX ORDER — VANCOMYCIN HCL 1 G
1000 VIAL (EA) INTRAVENOUS ONCE
Qty: 0 | Refills: 0 | Status: COMPLETED | OUTPATIENT
Start: 2017-11-26 | End: 2017-11-26

## 2017-11-26 RX ORDER — DOXAZOSIN MESYLATE 4 MG
2 TABLET ORAL AT BEDTIME
Qty: 0 | Refills: 0 | Status: DISCONTINUED | OUTPATIENT
Start: 2017-11-26 | End: 2017-11-27

## 2017-11-26 RX ORDER — OXYCODONE AND ACETAMINOPHEN 5; 325 MG/1; MG/1
2 TABLET ORAL EVERY 4 HOURS
Qty: 0 | Refills: 0 | Status: DISCONTINUED | OUTPATIENT
Start: 2017-11-26 | End: 2017-11-27

## 2017-11-26 RX ORDER — MINOXIDIL 10 MG
10 TABLET ORAL THREE TIMES A DAY
Qty: 0 | Refills: 0 | Status: DISCONTINUED | OUTPATIENT
Start: 2017-11-26 | End: 2017-11-27

## 2017-11-26 RX ADMIN — HYDROMORPHONE HYDROCHLORIDE 0.5 MILLIGRAM(S): 2 INJECTION INTRAMUSCULAR; INTRAVENOUS; SUBCUTANEOUS at 19:35

## 2017-11-26 RX ADMIN — HYDROMORPHONE HYDROCHLORIDE 0.5 MILLIGRAM(S): 2 INJECTION INTRAMUSCULAR; INTRAVENOUS; SUBCUTANEOUS at 21:19

## 2017-11-26 RX ADMIN — Medication 3 MILLILITER(S): at 21:20

## 2017-11-26 RX ADMIN — HYDROMORPHONE HYDROCHLORIDE 0.5 MILLIGRAM(S): 2 INJECTION INTRAMUSCULAR; INTRAVENOUS; SUBCUTANEOUS at 19:08

## 2017-11-26 RX ADMIN — HYDROMORPHONE HYDROCHLORIDE 0.5 MILLIGRAM(S): 2 INJECTION INTRAMUSCULAR; INTRAVENOUS; SUBCUTANEOUS at 21:34

## 2017-11-26 RX ADMIN — CEFEPIME 100 MILLIGRAM(S): 1 INJECTION, POWDER, FOR SOLUTION INTRAMUSCULAR; INTRAVENOUS at 22:41

## 2017-11-26 RX ADMIN — Medication 250 MILLIGRAM(S): at 21:32

## 2017-11-26 RX ADMIN — OXYCODONE AND ACETAMINOPHEN 2 TABLET(S): 5; 325 TABLET ORAL at 23:34

## 2017-11-26 RX ADMIN — Medication 25 MILLIGRAM(S): at 23:33

## 2017-11-26 NOTE — ED PROVIDER NOTE - OBJECTIVE STATEMENT
37F with CKD ESRD on M/W/F HD last completed on Friday without any issues presenting with cough, shortness of breath. Chest wall pain s/p repair, chronic always with deep breath. Pt states he was seen in ER at [facility], given neb/treatments without improvement. Still complaining of bleeding from fistula site.

## 2017-11-26 NOTE — ED PROVIDER NOTE - CARE PLAN
Principal Discharge DX:	Cough Principal Discharge DX:	Malfunction of arteriovenous dialysis fistula, initial encounter

## 2017-11-26 NOTE — ED ADULT TRIAGE NOTE - CHIEF COMPLAINT QUOTE
Patient c/o colds symptoms and fever for 1 week , also was sent here to evaluated the dialysis catheter for bleeding . Last dialysis was yesterday .

## 2017-11-26 NOTE — H&P ADULT - NSHPPHYSICALEXAM_GEN_ALL_CORE
General: NAD, Awake, alert  Pulm: No respiratory distress, unlabored breathing, coarse breath sounds  Cards: RRR  Extremity: Warm extremities b/l, RUE AVF with pulsatile thrill, dialysis puncture site with bandage in place, no bleeding, able to move all fingers, sensation intact, palpable right radial pulse

## 2017-11-26 NOTE — ED ADULT NURSE NOTE - OBJECTIVE STATEMENT
Pt w/ PMH Of ESRD on M/W/F dialysis and s/p abdominal aortic aneurysm repair and recent placement of A/V fistula to right side after left-sided A/V fistula ceased functioning x5 months ago presents to the ED today c/o cough, fever, malaise and bleeding catheter site.  Pt states his subjective illness sxs began x1 week ago and have not improved.  Pt had dialysis on Wed and bled after dialysis.  Dialysis was rescheduled d/t holiday for Sat.  Pt was able to complete dialysis yesterday, but bled from his fistula x2 hours after.  Pt's A/V fistula shows no signs of active bleeding, bruit auscultated and thrill palpated.  Pt denies CP, SOB, HA or N/V/D.  Pt is pending evaluation by [facility].

## 2017-11-26 NOTE — H&P ADULT - HISTORY OF PRESENT ILLNESS
Pt is a 38 y/o M with ESRD on HD (M/W/F - last full dialysis 11/10/17 ), malignant HTN, s/p Type A dissection repair with Dr. Moreno in 2015, pericardial window, sternal wound reconstruction with pec flap, and type B dissection repair at [facility] in 04/2016, LUE steal syndrome associated with the AV fistula, s/p Left AVF ligation and s/p Right brachiocephalic fistula with vein patch 6/22/17 who presented to [facility] ED with complain of cough, fever and bleeding from AVF site after dialysis on two occasions. Pt was recently admitted with complain of RUE AVF bleeding during dialysis and is s/p fistulogram and balloon angioplasty 11/15/17. Pt states that he had bleeding at dialysis on 11/22/17 and 11/25/17. On both occasions, bleeding stopped after pressure was held. Pt also reports bleeding at home after dialysis on 11/22/17 and having bed sheets saturated with blood.  He also has cough x4 and had a chills and fever of 101 today at home. He denies RUE pain, numbness, tingling, SOB, chest pain, runny nose, dizziness, nausea, vomiting. Pt is a 38 y/o M with ESRD on HD (M/W/F - last full dialysis 11/10/17 ), malignant HTN, s/p Type A dissection repair with Dr. Moreno in 2015, pericardial window, sternal wound reconstruction with pec flap, and type B dissection repair at [facility] in 04/2016, LUE steal syndrome associated with the AV fistula, s/p Left AVF ligation and s/p Right brachiocephalic fistula with vein patch 6/22/17 who presented to [facility] ED with complain of cough, fever and bleeding from AVF site after dialysis on two occasions. Pt was recently admitted with complain of RUE AVF bleeding during dialysis and is s/p fistulogram and balloon angioplasty 11/15/17. Pt states that he had bleeding at dialysis on 11/22/17 and 11/25/17. On both occasions, bleeding stopped after pressure was held. Pt also reports bleeding at home after dialysis on 11/22/17 and having bed sheets saturated with blood.  He also has dry cough x4 days and had chills and fever of 101 today at home. He denies RUE pain, numbness, tingling, SOB, chest pain, runny nose, dizziness, nausea, vomiting. Pt is a 36 y/o M with ESRD on HD (M/W/F - last full dialysis 11/10/17 ), malignant HTN, s/p Type A dissection repair with Dr. Moreno in 2015, pericardial window, sternal wound reconstruction with pec flap, and type B dissection repair at [facility] in 04/2016, LUE steal syndrome associated with the AV fistula, s/p Left AVF ligation and s/p Right brachiocephalic fistula with vein patch 6/22/17 who presented to [facility] ED with complain of cough, fever and bleeding from AVF site after dialysis on two occasions. Pt was recently admitted with complain of RUE AVF bleeding during dialysis and is s/p fistulogram and balloon angioplasty 11/15/17. Pt states that he had bleeding at dialysis on 11/22/17 and 11/25/17. On both occasions, bleeding stopped after pressure was held. Pt also reports bleeding at home after dialysis on 11/22/17 and having bed sheets saturated with blood.  He also has dry cough x4 days and had chills and fever of 101 today at home. He denies RUE pain, numbness, tingling, chest pain, runny nose, dizziness, nausea, vomiting. He admits to occasional SOB and chest wall pain with coughing

## 2017-11-26 NOTE — H&P ADULT - ASSESSMENT
Pt is a 38 y/o M with ESRD on HD (M/W/F - last full dialysis 11/10/17 ), malignant HTN, s/p Type A dissection repair with Dr. Moreno in 2015, pericardial window, sternal wound reconstruction with pec flap, and type B dissection repair at [facility] in 04/2016, LUE steal syndrome associated with the AV fistula, s/p Left AVF ligation and now with Right brachiocephalic fistula with vein patch 6/22/17 and recent RUE fistulogram 11/14/17 due to bleeding from fistula now with c/o of recurrent bleeding from right upper AVF after dialysis and cough    Home medication  Attempt HD tomorrow  Possible fistulogram if bleeding occurs with HD  NPO after midnight  F/u renal recs  F/u 2am labs Pt is a 36 y/o M with ESRD on HD (M/W/F - last full dialysis 11/10/17 ), malignant HTN, s/p Type A dissection repair with Dr. Moreno in 2015, pericardial window, sternal wound reconstruction with pec flap, and type B dissection repair at [facility] in 04/2016, LUE steal syndrome associated with the AV fistula, s/p Left AVF ligation and now with Right brachiocephalic fistula with vein patch 6/22/17 and recent RUE fistulogram 11/14/17 due to bleeding from fistula now with c/o of recurrent bleeding from right upper AVF after dialysis and cough    Home medication  Renal consult  Attempt dialysis today  Possible fistulogram if bleeding occurs with HD  NPO Pt is a 38 y/o M with ESRD on HD (M/W/F - last full dialysis 11/10/17 ), malignant HTN, s/p Type A dissection repair with Dr. Moreno in 2015, pericardial window, sternal wound reconstruction with pec flap, and type B dissection repair at [facility] in 04/2016, LUE steal syndrome associated with the AV fistula, s/p Left AVF ligation and now with Right brachiocephalic fistula with vein patch 6/22/17 and recent RUE fistulogram 11/14/17 due to bleeding from fistula now with c/o of recurrent bleeding from right upper AVF after dialysis and cough    Home medication  Renal consult  Attempt dialysis tomorrow  Possible fistulogram if bleeding occurs with HD  NPO after midnight for possible OR   F/u 2am labs

## 2017-11-26 NOTE — ED PROVIDER NOTE - MEDICAL DECISION MAKING DETAILS
37M with ESRD on dialysis presenting with complaint of bleeding at dialysis site. Vitals wnl, lungs notable for rhonchi throughout, plan to admit for IV abx. Plan for dialysis tomorrow and re-check by vascular for further bleeding. NPO after midnight for potential fistulogram. Doubt PE, pt with rhonchi, no risk factors. Likely pna/bronchitis. Doubt PTX, neg CXr.

## 2017-11-27 DIAGNOSIS — I77.0 ARTERIOVENOUS FISTULA, ACQUIRED: ICD-10-CM

## 2017-11-27 LAB
ALBUMIN SERPL ELPH-MCNC: 4.7 G/DL — SIGNIFICANT CHANGE UP (ref 3.3–5)
ALP SERPL-CCNC: 48 U/L — SIGNIFICANT CHANGE UP (ref 40–120)
ALT FLD-CCNC: 9 U/L — LOW (ref 10–45)
ANION GAP SERPL CALC-SCNC: 19 MMOL/L — HIGH (ref 5–17)
ANION GAP SERPL CALC-SCNC: 24 MMOL/L — HIGH (ref 5–17)
APTT BLD: 48.4 SEC — HIGH (ref 27.5–37.4)
APTT BLD: 48.6 SEC — HIGH (ref 27.5–37.4)
AST SERPL-CCNC: 15 U/L — SIGNIFICANT CHANGE UP (ref 10–40)
BILIRUB SERPL-MCNC: 1 MG/DL — SIGNIFICANT CHANGE UP (ref 0.2–1.2)
BUN SERPL-MCNC: 81 MG/DL — HIGH (ref 7–23)
BUN SERPL-MCNC: 82 MG/DL — HIGH (ref 7–23)
CALCIUM SERPL-MCNC: 8.7 MG/DL — SIGNIFICANT CHANGE UP (ref 8.4–10.5)
CALCIUM SERPL-MCNC: 9.7 MG/DL — SIGNIFICANT CHANGE UP (ref 8.4–10.5)
CHLORIDE SERPL-SCNC: 91 MMOL/L — LOW (ref 96–108)
CHLORIDE SERPL-SCNC: 93 MMOL/L — LOW (ref 96–108)
CK MB CFR SERPL CALC: 2.2 NG/ML — SIGNIFICANT CHANGE UP (ref 0–6.7)
CK MB CFR SERPL CALC: 2.4 NG/ML — SIGNIFICANT CHANGE UP (ref 0–6.7)
CK MB CFR SERPL CALC: 2.8 NG/ML — SIGNIFICANT CHANGE UP (ref 0–6.7)
CK MB CFR SERPL CALC: 4.1 NG/ML — SIGNIFICANT CHANGE UP (ref 0–6.7)
CK SERPL-CCNC: 82 U/L — SIGNIFICANT CHANGE UP (ref 30–200)
CK SERPL-CCNC: 87 U/L — SIGNIFICANT CHANGE UP (ref 30–200)
CK SERPL-CCNC: 87 U/L — SIGNIFICANT CHANGE UP (ref 30–200)
CK SERPL-CCNC: 89 U/L — SIGNIFICANT CHANGE UP (ref 30–200)
CO2 SERPL-SCNC: 22 MMOL/L — SIGNIFICANT CHANGE UP (ref 22–31)
CO2 SERPL-SCNC: 25 MMOL/L — SIGNIFICANT CHANGE UP (ref 22–31)
CREAT SERPL-MCNC: 11.68 MG/DL — HIGH (ref 0.5–1.3)
CREAT SERPL-MCNC: 12.32 MG/DL — HIGH (ref 0.5–1.3)
D DIMER BLD IA.RAPID-MCNC: 5324 NG/ML DDU — HIGH
FERRITIN SERPL-MCNC: 1021 NG/ML — HIGH (ref 30–400)
GLUCOSE BLDC GLUCOMTR-MCNC: 103 MG/DL — HIGH (ref 70–99)
GLUCOSE BLDC GLUCOMTR-MCNC: 120 MG/DL — HIGH (ref 70–99)
GLUCOSE BLDC GLUCOMTR-MCNC: 124 MG/DL — HIGH (ref 70–99)
GLUCOSE BLDC GLUCOMTR-MCNC: 168 MG/DL — HIGH (ref 70–99)
GLUCOSE BLDC GLUCOMTR-MCNC: 32 MG/DL — CRITICAL LOW (ref 70–99)
GLUCOSE BLDC GLUCOMTR-MCNC: 45 MG/DL — CRITICAL LOW (ref 70–99)
GLUCOSE BLDC GLUCOMTR-MCNC: 87 MG/DL — SIGNIFICANT CHANGE UP (ref 70–99)
GLUCOSE BLDC GLUCOMTR-MCNC: 87 MG/DL — SIGNIFICANT CHANGE UP (ref 70–99)
GLUCOSE BLDC GLUCOMTR-MCNC: 91 MG/DL — SIGNIFICANT CHANGE UP (ref 70–99)
GLUCOSE BLDC GLUCOMTR-MCNC: 92 MG/DL — SIGNIFICANT CHANGE UP (ref 70–99)
GLUCOSE SERPL-MCNC: 283 MG/DL — HIGH (ref 70–99)
GLUCOSE SERPL-MCNC: 95 MG/DL — SIGNIFICANT CHANGE UP (ref 70–99)
HCT VFR BLD CALC: 24.1 % — LOW (ref 39–50)
HCT VFR BLD CALC: 27.1 % — LOW (ref 39–50)
HGB BLD-MCNC: 7.8 G/DL — LOW (ref 13–17)
HGB BLD-MCNC: 8.8 G/DL — LOW (ref 13–17)
INR BLD: 1.14 — SIGNIFICANT CHANGE UP (ref 0.88–1.16)
INR BLD: 1.22 — HIGH (ref 0.88–1.16)
IRON SATN MFR SERPL: 17 % — SIGNIFICANT CHANGE UP (ref 16–55)
IRON SATN MFR SERPL: 36 UG/DL — LOW (ref 45–165)
LACTATE SERPL-SCNC: 1 MMOL/L — SIGNIFICANT CHANGE UP (ref 0.5–2)
MAGNESIUM SERPL-MCNC: 2.3 MG/DL — SIGNIFICANT CHANGE UP (ref 1.6–2.6)
MCHC RBC-ENTMCNC: 30.7 PG — SIGNIFICANT CHANGE UP (ref 27–34)
MCHC RBC-ENTMCNC: 30.8 PG — SIGNIFICANT CHANGE UP (ref 27–34)
MCHC RBC-ENTMCNC: 32.4 G/DL — SIGNIFICANT CHANGE UP (ref 32–36)
MCHC RBC-ENTMCNC: 32.5 G/DL — SIGNIFICANT CHANGE UP (ref 32–36)
MCV RBC AUTO: 94.8 FL — SIGNIFICANT CHANGE UP (ref 80–100)
MCV RBC AUTO: 94.9 FL — SIGNIFICANT CHANGE UP (ref 80–100)
PHOSPHATE SERPL-MCNC: 8.6 MG/DL — HIGH (ref 2.5–4.5)
PLATELET # BLD AUTO: 124 K/UL — LOW (ref 150–400)
PLATELET # BLD AUTO: 92 K/UL — LOW (ref 150–400)
POTASSIUM SERPL-MCNC: 4.2 MMOL/L — SIGNIFICANT CHANGE UP (ref 3.5–5.3)
POTASSIUM SERPL-MCNC: 6 MMOL/L — HIGH (ref 3.5–5.3)
POTASSIUM SERPL-SCNC: 4.2 MMOL/L — SIGNIFICANT CHANGE UP (ref 3.5–5.3)
POTASSIUM SERPL-SCNC: 6 MMOL/L — HIGH (ref 3.5–5.3)
PROT SERPL-MCNC: 8 G/DL — SIGNIFICANT CHANGE UP (ref 6–8.3)
PROTHROM AB SERPL-ACNC: 12.7 SEC — SIGNIFICANT CHANGE UP (ref 9.8–12.7)
PROTHROM AB SERPL-ACNC: 13.6 SEC — HIGH (ref 9.8–12.7)
RBC # BLD: 2.54 M/UL — LOW (ref 4.2–5.8)
RBC # BLD: 2.86 M/UL — LOW (ref 4.2–5.8)
RBC # FLD: 15.4 % — SIGNIFICANT CHANGE UP (ref 10.3–16.9)
RBC # FLD: 15.7 % — SIGNIFICANT CHANGE UP (ref 10.3–16.9)
SODIUM SERPL-SCNC: 135 MMOL/L — SIGNIFICANT CHANGE UP (ref 135–145)
SODIUM SERPL-SCNC: 139 MMOL/L — SIGNIFICANT CHANGE UP (ref 135–145)
TIBC SERPL-MCNC: 208 UG/DL — LOW (ref 220–430)
TROPONIN T SERPL-MCNC: 0.18 NG/ML — CRITICAL HIGH (ref 0–0.01)
TROPONIN T SERPL-MCNC: 0.18 NG/ML — CRITICAL HIGH (ref 0–0.01)
TROPONIN T SERPL-MCNC: 0.2 NG/ML — CRITICAL HIGH (ref 0–0.01)
TROPONIN T SERPL-MCNC: <0.01 NG/ML — SIGNIFICANT CHANGE UP (ref 0–0.01)
UIBC SERPL-MCNC: 172 UG/DL — SIGNIFICANT CHANGE UP (ref 110–370)
WBC # BLD: 3.4 K/UL — LOW (ref 3.8–10.5)
WBC # BLD: 6.1 K/UL — SIGNIFICANT CHANGE UP (ref 3.8–10.5)
WBC # FLD AUTO: 3.4 K/UL — LOW (ref 3.8–10.5)
WBC # FLD AUTO: 6.1 K/UL — SIGNIFICANT CHANGE UP (ref 3.8–10.5)

## 2017-11-27 PROCEDURE — 71275 CT ANGIOGRAPHY CHEST: CPT | Mod: 26

## 2017-11-27 PROCEDURE — 71010: CPT | Mod: 26

## 2017-11-27 PROCEDURE — 99223 1ST HOSP IP/OBS HIGH 75: CPT | Mod: GC

## 2017-11-27 PROCEDURE — 90935 HEMODIALYSIS ONE EVALUATION: CPT | Mod: GC

## 2017-11-27 PROCEDURE — 93306 TTE W/DOPPLER COMPLETE: CPT | Mod: 26

## 2017-11-27 PROCEDURE — 75635 CT ANGIO ABDOMINAL ARTERIES: CPT | Mod: 26

## 2017-11-27 RX ORDER — HYDRALAZINE HCL 50 MG
10 TABLET ORAL ONCE
Qty: 0 | Refills: 0 | Status: COMPLETED | OUTPATIENT
Start: 2017-11-27 | End: 2017-11-27

## 2017-11-27 RX ORDER — DEXTROSE 50 % IN WATER 50 %
1 SYRINGE (ML) INTRAVENOUS ONCE
Qty: 0 | Refills: 0 | Status: DISCONTINUED | OUTPATIENT
Start: 2017-11-27 | End: 2017-11-28

## 2017-11-27 RX ORDER — INSULIN LISPRO 100/ML
VIAL (ML) SUBCUTANEOUS
Qty: 0 | Refills: 0 | Status: DISCONTINUED | OUTPATIENT
Start: 2017-11-27 | End: 2017-11-28

## 2017-11-27 RX ORDER — DEXTROSE 50 % IN WATER 50 %
25 SYRINGE (ML) INTRAVENOUS ONCE
Qty: 0 | Refills: 0 | Status: COMPLETED | OUTPATIENT
Start: 2017-11-27 | End: 2017-11-27

## 2017-11-27 RX ORDER — HYDROMORPHONE HYDROCHLORIDE 2 MG/ML
0.5 INJECTION INTRAMUSCULAR; INTRAVENOUS; SUBCUTANEOUS EVERY 4 HOURS
Qty: 0 | Refills: 0 | Status: DISCONTINUED | OUTPATIENT
Start: 2017-11-27 | End: 2017-11-28

## 2017-11-27 RX ORDER — NICARDIPINE HYDROCHLORIDE 30 MG/1
5 CAPSULE, EXTENDED RELEASE ORAL
Qty: 40 | Refills: 0 | Status: DISCONTINUED | OUTPATIENT
Start: 2017-11-27 | End: 2017-11-28

## 2017-11-27 RX ORDER — MINOXIDIL 10 MG
10 TABLET ORAL THREE TIMES A DAY
Qty: 0 | Refills: 0 | Status: DISCONTINUED | OUTPATIENT
Start: 2017-11-27 | End: 2017-11-28

## 2017-11-27 RX ORDER — HYDRALAZINE HCL 50 MG
100 TABLET ORAL EVERY 8 HOURS
Qty: 0 | Refills: 0 | Status: DISCONTINUED | OUTPATIENT
Start: 2017-11-27 | End: 2017-11-28

## 2017-11-27 RX ORDER — DEXTROSE 10 % IN WATER 10 %
1000 INTRAVENOUS SOLUTION INTRAVENOUS
Qty: 0 | Refills: 0 | Status: DISCONTINUED | OUTPATIENT
Start: 2017-11-27 | End: 2017-11-27

## 2017-11-27 RX ORDER — ISOSORBIDE MONONITRATE 60 MG/1
60 TABLET, EXTENDED RELEASE ORAL DAILY
Qty: 0 | Refills: 0 | Status: DISCONTINUED | OUTPATIENT
Start: 2017-11-27 | End: 2017-11-28

## 2017-11-27 RX ORDER — DEXTROSE 50 % IN WATER 50 %
12.5 SYRINGE (ML) INTRAVENOUS ONCE
Qty: 0 | Refills: 0 | Status: DISCONTINUED | OUTPATIENT
Start: 2017-11-27 | End: 2017-11-28

## 2017-11-27 RX ORDER — HYDROMORPHONE HYDROCHLORIDE 2 MG/ML
1 INJECTION INTRAMUSCULAR; INTRAVENOUS; SUBCUTANEOUS EVERY 4 HOURS
Qty: 0 | Refills: 0 | Status: DISCONTINUED | OUTPATIENT
Start: 2017-11-27 | End: 2017-11-28

## 2017-11-27 RX ORDER — LOSARTAN POTASSIUM 100 MG/1
100 TABLET, FILM COATED ORAL DAILY
Qty: 0 | Refills: 0 | Status: DISCONTINUED | OUTPATIENT
Start: 2017-11-27 | End: 2017-11-28

## 2017-11-27 RX ORDER — INSULIN HUMAN 100 [IU]/ML
10 INJECTION, SOLUTION SUBCUTANEOUS ONCE
Qty: 0 | Refills: 0 | Status: COMPLETED | OUTPATIENT
Start: 2017-11-27 | End: 2017-11-27

## 2017-11-27 RX ORDER — DOXAZOSIN MESYLATE 4 MG
2 TABLET ORAL AT BEDTIME
Qty: 0 | Refills: 0 | Status: DISCONTINUED | OUTPATIENT
Start: 2017-11-27 | End: 2017-11-28

## 2017-11-27 RX ORDER — CARVEDILOL PHOSPHATE 80 MG/1
50 CAPSULE, EXTENDED RELEASE ORAL EVERY 12 HOURS
Qty: 0 | Refills: 0 | Status: DISCONTINUED | OUTPATIENT
Start: 2017-11-27 | End: 2017-11-28

## 2017-11-27 RX ORDER — HYDROMORPHONE HYDROCHLORIDE 2 MG/ML
1 INJECTION INTRAMUSCULAR; INTRAVENOUS; SUBCUTANEOUS ONCE
Qty: 0 | Refills: 0 | Status: DISCONTINUED | OUTPATIENT
Start: 2017-11-27 | End: 2017-11-27

## 2017-11-27 RX ORDER — SODIUM CHLORIDE 9 MG/ML
1000 INJECTION, SOLUTION INTRAVENOUS
Qty: 0 | Refills: 0 | Status: DISCONTINUED | OUTPATIENT
Start: 2017-11-27 | End: 2017-11-28

## 2017-11-27 RX ORDER — GLUCAGON INJECTION, SOLUTION 0.5 MG/.1ML
1 INJECTION, SOLUTION SUBCUTANEOUS ONCE
Qty: 0 | Refills: 0 | Status: DISCONTINUED | OUTPATIENT
Start: 2017-11-27 | End: 2017-11-28

## 2017-11-27 RX ORDER — SODIUM POLYSTYRENE SULFONATE 4.1 MEQ/G
30 POWDER, FOR SUSPENSION ORAL ONCE
Qty: 0 | Refills: 0 | Status: COMPLETED | OUTPATIENT
Start: 2017-11-27 | End: 2017-11-27

## 2017-11-27 RX ORDER — ESMOLOL HCL 100MG/10ML
50 VIAL (ML) INTRAVENOUS
Qty: 2500 | Refills: 0 | Status: DISCONTINUED | OUTPATIENT
Start: 2017-11-27 | End: 2017-11-27

## 2017-11-27 RX ORDER — CALCIUM GLUCONATE 100 MG/ML
1 VIAL (ML) INTRAVENOUS ONCE
Qty: 0 | Refills: 0 | Status: COMPLETED | OUTPATIENT
Start: 2017-11-27 | End: 2017-11-27

## 2017-11-27 RX ORDER — DEXTROSE 50 % IN WATER 50 %
25 SYRINGE (ML) INTRAVENOUS ONCE
Qty: 0 | Refills: 0 | Status: DISCONTINUED | OUTPATIENT
Start: 2017-11-27 | End: 2017-11-28

## 2017-11-27 RX ORDER — AMLODIPINE BESYLATE 2.5 MG/1
10 TABLET ORAL DAILY
Qty: 0 | Refills: 0 | Status: DISCONTINUED | OUTPATIENT
Start: 2017-11-27 | End: 2017-11-28

## 2017-11-27 RX ORDER — ERYTHROPOIETIN 10000 [IU]/ML
10000 INJECTION, SOLUTION INTRAVENOUS; SUBCUTANEOUS ONCE
Qty: 0 | Refills: 0 | Status: COMPLETED | OUTPATIENT
Start: 2017-11-27 | End: 2017-11-27

## 2017-11-27 RX ADMIN — Medication 0.3 MILLIGRAM(S): at 06:06

## 2017-11-27 RX ADMIN — AMLODIPINE BESYLATE 10 MILLIGRAM(S): 2.5 TABLET ORAL at 17:23

## 2017-11-27 RX ADMIN — HYDROMORPHONE HYDROCHLORIDE 1 MILLIGRAM(S): 2 INJECTION INTRAMUSCULAR; INTRAVENOUS; SUBCUTANEOUS at 10:00

## 2017-11-27 RX ADMIN — HYDROMORPHONE HYDROCHLORIDE 0.5 MILLIGRAM(S): 2 INJECTION INTRAMUSCULAR; INTRAVENOUS; SUBCUTANEOUS at 21:07

## 2017-11-27 RX ADMIN — ALBUTEROL 2.5 MILLIGRAM(S): 90 AEROSOL, METERED ORAL at 10:00

## 2017-11-27 RX ADMIN — OXYCODONE AND ACETAMINOPHEN 2 TABLET(S): 5; 325 TABLET ORAL at 07:06

## 2017-11-27 RX ADMIN — HYDROMORPHONE HYDROCHLORIDE 1 MILLIGRAM(S): 2 INJECTION INTRAMUSCULAR; INTRAVENOUS; SUBCUTANEOUS at 14:07

## 2017-11-27 RX ADMIN — Medication 25 MILLIGRAM(S): at 21:18

## 2017-11-27 RX ADMIN — HYDROMORPHONE HYDROCHLORIDE 1 MILLIGRAM(S): 2 INJECTION INTRAMUSCULAR; INTRAVENOUS; SUBCUTANEOUS at 15:03

## 2017-11-27 RX ADMIN — FAMOTIDINE 20 MILLIGRAM(S): 10 INJECTION INTRAVENOUS at 12:57

## 2017-11-27 RX ADMIN — ISOSORBIDE MONONITRATE 60 MILLIGRAM(S): 60 TABLET, EXTENDED RELEASE ORAL at 17:52

## 2017-11-27 RX ADMIN — Medication 200 GRAM(S): at 05:11

## 2017-11-27 RX ADMIN — Medication 25 MILLIGRAM(S): at 06:11

## 2017-11-27 RX ADMIN — HYDROMORPHONE HYDROCHLORIDE 1 MILLIGRAM(S): 2 INJECTION INTRAMUSCULAR; INTRAVENOUS; SUBCUTANEOUS at 12:17

## 2017-11-27 RX ADMIN — HYDROMORPHONE HYDROCHLORIDE 1 MILLIGRAM(S): 2 INJECTION INTRAMUSCULAR; INTRAVENOUS; SUBCUTANEOUS at 11:24

## 2017-11-27 RX ADMIN — HYDROMORPHONE HYDROCHLORIDE 1 MILLIGRAM(S): 2 INJECTION INTRAMUSCULAR; INTRAVENOUS; SUBCUTANEOUS at 13:18

## 2017-11-27 RX ADMIN — Medication 40 MILLILITER(S): at 08:34

## 2017-11-27 RX ADMIN — Medication 25 MILLIGRAM(S): at 06:06

## 2017-11-27 RX ADMIN — SEVELAMER CARBONATE 1600 MILLIGRAM(S): 2400 POWDER, FOR SUSPENSION ORAL at 17:22

## 2017-11-27 RX ADMIN — Medication 81 MILLIGRAM(S): at 12:57

## 2017-11-27 RX ADMIN — Medication 25 MILLIGRAM(S): at 00:22

## 2017-11-27 RX ADMIN — HEPARIN SODIUM 5000 UNIT(S): 5000 INJECTION INTRAVENOUS; SUBCUTANEOUS at 21:07

## 2017-11-27 RX ADMIN — INSULIN HUMAN 10 UNIT(S): 100 INJECTION, SOLUTION SUBCUTANEOUS at 05:56

## 2017-11-27 RX ADMIN — HEPARIN SODIUM 5000 UNIT(S): 5000 INJECTION INTRAVENOUS; SUBCUTANEOUS at 06:07

## 2017-11-27 RX ADMIN — Medication 10 MILLIGRAM(S): at 08:39

## 2017-11-27 RX ADMIN — OXYCODONE AND ACETAMINOPHEN 2 TABLET(S): 5; 325 TABLET ORAL at 00:34

## 2017-11-27 RX ADMIN — Medication 40 MILLILITER(S): at 16:04

## 2017-11-27 RX ADMIN — ERYTHROPOIETIN 10000 UNIT(S): 10000 INJECTION, SOLUTION INTRAVENOUS; SUBCUTANEOUS at 13:12

## 2017-11-27 RX ADMIN — Medication 25 GRAM(S): at 05:56

## 2017-11-27 RX ADMIN — Medication 10 MILLIGRAM(S): at 08:40

## 2017-11-27 RX ADMIN — ALBUTEROL 2.5 MILLIGRAM(S): 90 AEROSOL, METERED ORAL at 07:48

## 2017-11-27 RX ADMIN — Medication 26.43 MICROGRAM(S)/KG/MIN: at 16:15

## 2017-11-27 RX ADMIN — CARVEDILOL PHOSPHATE 25 MILLIGRAM(S): 80 CAPSULE, EXTENDED RELEASE ORAL at 06:06

## 2017-11-27 RX ADMIN — LOSARTAN POTASSIUM 100 MILLIGRAM(S): 100 TABLET, FILM COATED ORAL at 17:22

## 2017-11-27 RX ADMIN — GABAPENTIN 300 MILLIGRAM(S): 400 CAPSULE ORAL at 12:57

## 2017-11-27 RX ADMIN — ATORVASTATIN CALCIUM 10 MILLIGRAM(S): 80 TABLET, FILM COATED ORAL at 21:07

## 2017-11-27 RX ADMIN — NICARDIPINE HYDROCHLORIDE 25 MG/HR: 30 CAPSULE, EXTENDED RELEASE ORAL at 16:47

## 2017-11-27 RX ADMIN — Medication 26.43 MICROGRAM(S)/KG/MIN: at 12:00

## 2017-11-27 RX ADMIN — CARVEDILOL PHOSPHATE 50 MILLIGRAM(S): 80 CAPSULE, EXTENDED RELEASE ORAL at 17:23

## 2017-11-27 RX ADMIN — HEPARIN SODIUM 5000 UNIT(S): 5000 INJECTION INTRAVENOUS; SUBCUTANEOUS at 14:22

## 2017-11-27 RX ADMIN — Medication 25 MILLIGRAM(S): at 15:00

## 2017-11-27 RX ADMIN — OXYCODONE AND ACETAMINOPHEN 2 TABLET(S): 5; 325 TABLET ORAL at 06:06

## 2017-11-27 RX ADMIN — Medication 25 GRAM(S): at 08:59

## 2017-11-27 RX ADMIN — SODIUM POLYSTYRENE SULFONATE 30 GRAM(S): 4.1 POWDER, FOR SUSPENSION ORAL at 05:57

## 2017-11-27 RX ADMIN — Medication 10 MILLIGRAM(S): at 06:11

## 2017-11-27 NOTE — CONSULT NOTE ADULT - PROBLEM SELECTOR RECOMMENDATION 2
Hypertensive urgency likely due to volume dependent and partial HD treatment due to AV fistula dysfunction.  Optimal BP control  Low salt diet  Will adjust UF during HD treatment today as tolerated by Blood pressure to his EDW.

## 2017-11-27 NOTE — CONSULT NOTE ADULT - ASSESSMENT
A/P:b 37yoM with malignant HTN, ESRD on HD, Type A and B aortic dissections s/p repair c/b wound infection s/p pec flap, admitted with bleeding from AVF, hyperkalemia, and anemia, transferred to SICU for hypertensive emergency and concern for aortic dissection    Neuro: Dilaudid, Ofirmev PRN. Neurontin  Resp: NC, goal SpO2>91, prn Duonebs.   CV: SBP goal <180. PRN labetalol, hydralazine. Norvasc, atorvastatin, Coreg, clonidine, cardura, losartan, minoxidil  GI/FEN: NPO, D10@40, colace, pepcid  : Voids. Sevelamer. HD MWF, plan for today. Possible fistulogram at later date if bleeding occurs with HD  Heme: ASA, SQH  Endo: ISS  ID: N/A  Wounds/Lines: PIVs

## 2017-11-27 NOTE — CONSULT NOTE ADULT - ATTENDING COMMENTS
HD today for UF and clearance --aggressive UF as tolerated for sig HTN and dyspnea  clearnace for hyperkalemia   for fistulogram  f/u blood cx and CTA

## 2017-11-27 NOTE — PROVIDER CONTACT NOTE (CRITICAL VALUE NOTIFICATION) - ACTION/TREATMENT ORDERED:
No interventions at this time as per MD Jay.  Pt currently in CT at this time, vascular team and MD with patient.

## 2017-11-27 NOTE — PROGRESS NOTE ADULT - SUBJECTIVE AND OBJECTIVE BOX
Subjective:     Overnight, pt admitted for bleeding fistula and possible fistulogram after HD for fistula stenosis. 2 AM K was 6, pt given Ca Gluc, 10 U insulin with D50, and kayexelate around 6 AM.     Around 7 AM, pt diaphoretic with blurry vision, upper back, and left flank pain. Rapid response called. BS 32, another amp of D50 given and started on D10 drip at 40 mL/hr. /95: total of 20 labetalol and 10 hydralazine given. Pt taken for CTA to r/o new aortic dissection given h/o type A + B repairs. Pt then transferred to SICU for BP control and emergent HD.    Upon arrival to SICU, pt complained of shortness of breath and persistent left flank pain. DuoNeb treatment and dilaudid provided relief of symptoms. Back pain and blurry vision resolved. No HA, no pre-syncope, no CP, no abd pain, no N/V, no pain/numbness/weakness in arms or legs.        Vital Signs Last 24 Hrs  T(C): 35.8 (27 Nov 2017 09:51), Max: 37.1 (26 Nov 2017 16:50)  T(F): 96.5 (27 Nov 2017 09:51), Max: 98.8 (26 Nov 2017 16:50)  HR: 90 (27 Nov 2017 10:30) (54 - 92)  BP: 141/65 (27 Nov 2017 10:30) (141/65 - 171/103)  BP(mean): 80 (27 Nov 2017 10:30) (80 - 133)  RR: 21 (27 Nov 2017 10:30) (16 - 26)  SpO2: 99% (27 Nov 2017 10:30) (96% - 100%)    I&O's Summary    27 Nov 2017 07:01  -  27 Nov 2017 11:22  --------------------------------------------------------  IN: 40 mL / OUT: 0 mL / NET: 40 mL        Physical Exam upon arrival in SICU:  General: mild distress from left flank pain  Chest: healed median sternotomy scar  Pulmonary: increased resp effort, lower lobe wheezing bilaterally, no crackles/rhonchi  Cardiovascular: NSR, diastolic murmur over aortic valve area, normal S1/S2, no rubs/gallop  Abdominal: soft, ND, NT in abdomen, however moderate tenderness in left flank, no CVA/vertebral tenderness  Extremities: WWP, normal strength, no clubbing/cyanosis/edema  Neuro: A/O x 3, no focal deficits, sensation normal  Pulses: right radial 2+ palp, Left radial 1+ palp, palp thrill over RUE AVF  Right:                                                                  FEM [ ]2+ [ ]1+ [ ]doppler  POP [ ]2+ [ ]1+ [ ]doppler  DP [ x]2+ [ ]1+ [ ]doppler  PT[x ]2+ [ ]1+ [ ]doppler    Left:  FEM [ ]2+ [ ]1+ [ ]doppler    POP [ ]2+ [ ]1+ [ ]doppler   DP [x ]2+ [ ]1+ [ ]doppler  PT [x ]2+ [ ]1+ [ ]doppler    Lines/drains/tubes:    LABS:                        8.8    6.1   )-----------( 124      ( 27 Nov 2017 08:11 )             27.1     11-27    139  |  93<L>  |  82<H>  ----------------------------<  283<H>  4.2   |  22  |  12.32<H>    Ca    9.7      27 Nov 2017 08:10  Phos  8.6     11-27  Mg     2.3     11-27    TPro  8.0  /  Alb  4.7  /  TBili  1.0  /  DBili  x   /  AST  15  /  ALT  9<L>  /  AlkPhos  48  11-27    PT/INR - ( 27 Nov 2017 08:10 )   PT: 12.7 sec;   INR: 1.14          PTT - ( 27 Nov 2017 08:10 )  PTT:48.6 sec    LIVER FUNCTIONS - ( 27 Nov 2017 08:10 )  Alb: 4.7 g/dL / Pro: 8.0 g/dL / ALK PHOS: 48 U/L / ALT: 9 U/L / AST: 15 U/L / GGT: x           CAPILLARY BLOOD GLUCOSE      POCT Blood Glucose.: 91 mg/dL (27 Nov 2017 11:15)  POCT Blood Glucose.: 87 mg/dL (27 Nov 2017 10:08)  POCT Blood Glucose.: 120 mg/dL (27 Nov 2017 08:35)  POCT Blood Glucose.: 87 mg/dL (27 Nov 2017 08:12)  POCT Blood Glucose.: 168 mg/dL (27 Nov 2017 07:57)  POCT Blood Glucose.: 45 mg/dL (27 Nov 2017 07:51)  POCT Blood Glucose.: 103 mg/dL (27 Nov 2017 07:10)  POCT Blood Glucose.: 32 mg/dL (27 Nov 2017 06:56)      RADIOLOGY & ADDITIONAL TESTS:  pending final CTA read Subjective:     Overnight, pt admitted for bleeding fistula and possible fistulogram after HD for fistula stenosis. 2 AM K was 6, pt given Ca Gluc, 10 U insulin with D50, and kayexelate around 6 AM.     Around 7 AM, pt diaphoretic with blurry vision, upper back, and left flank pain. Rapid response called. FS 32, another amp of D50 given and started on D10 drip at 40 mL/hr. /95: total of 20 labetalol and 10 hydralazine given. Pt taken for CTA to r/o new aortic dissection given h/o type A + B repairs. Pt then transferred to SICU for BP control and emergent HD.    Upon arrival to SICU, pt complained of shortness of breath and persistent left flank pain. DuoNeb treatment and dilaudid provided relief of symptoms. Back pain and blurry vision resolved. No HA, no pre-syncope, no CP, no abd pain, no N/V, no pain/numbness/weakness in arms or legs.        Vital Signs Last 24 Hrs  T(C): 35.8 (27 Nov 2017 09:51), Max: 37.1 (26 Nov 2017 16:50)  T(F): 96.5 (27 Nov 2017 09:51), Max: 98.8 (26 Nov 2017 16:50)  HR: 90 (27 Nov 2017 10:30) (54 - 92)  BP: 141/65 (27 Nov 2017 10:30) (141/65 - 171/103)  BP(mean): 80 (27 Nov 2017 10:30) (80 - 133)  RR: 21 (27 Nov 2017 10:30) (16 - 26)  SpO2: 99% (27 Nov 2017 10:30) (96% - 100%)    I&O's Summary    27 Nov 2017 07:01  -  27 Nov 2017 11:22  --------------------------------------------------------  IN: 40 mL / OUT: 0 mL / NET: 40 mL        Physical Exam upon arrival in SICU:  General: mild distress from left flank pain  Chest: healed median sternotomy scar  Pulmonary: increased resp effort, lower lobe wheezing bilaterally, no crackles/rhonchi  Cardiovascular: NSR, diastolic murmur over aortic valve area, normal S1/S2, no rubs/gallop  Abdominal: soft, ND, NT in abdomen, however moderate tenderness in left flank, no CVA/vertebral tenderness  Extremities: WWP, normal strength, no clubbing/cyanosis/edema  Neuro: A/O x 3, no focal deficits, sensation normal  Pulses: right radial 2+ palp, Left radial 1+ palp, palp thrill over RUE AVF  Right:                                                                  FEM [ ]2+ [ ]1+ [ ]doppler  POP [ ]2+ [ ]1+ [ ]doppler  DP [ x]2+ [ ]1+ [ ]doppler  PT[x ]2+ [ ]1+ [ ]doppler    Left:  FEM [ ]2+ [ ]1+ [ ]doppler    POP [ ]2+ [ ]1+ [ ]doppler   DP [x ]2+ [ ]1+ [ ]doppler  PT [x ]2+ [ ]1+ [ ]doppler    Lines/drains/tubes:    LABS:                        8.8    6.1   )-----------( 124      ( 27 Nov 2017 08:11 )             27.1     11-27    139  |  93<L>  |  82<H>  ----------------------------<  283<H>  4.2   |  22  |  12.32<H>    Ca    9.7      27 Nov 2017 08:10  Phos  8.6     11-27  Mg     2.3     11-27    TPro  8.0  /  Alb  4.7  /  TBili  1.0  /  DBili  x   /  AST  15  /  ALT  9<L>  /  AlkPhos  48  11-27    PT/INR - ( 27 Nov 2017 08:10 )   PT: 12.7 sec;   INR: 1.14          PTT - ( 27 Nov 2017 08:10 )  PTT:48.6 sec    LIVER FUNCTIONS - ( 27 Nov 2017 08:10 )  Alb: 4.7 g/dL / Pro: 8.0 g/dL / ALK PHOS: 48 U/L / ALT: 9 U/L / AST: 15 U/L / GGT: x           CAPILLARY BLOOD GLUCOSE      POCT Blood Glucose.: 91 mg/dL (27 Nov 2017 11:15)  POCT Blood Glucose.: 87 mg/dL (27 Nov 2017 10:08)  POCT Blood Glucose.: 120 mg/dL (27 Nov 2017 08:35)  POCT Blood Glucose.: 87 mg/dL (27 Nov 2017 08:12)  POCT Blood Glucose.: 168 mg/dL (27 Nov 2017 07:57)  POCT Blood Glucose.: 45 mg/dL (27 Nov 2017 07:51)  POCT Blood Glucose.: 103 mg/dL (27 Nov 2017 07:10)  POCT Blood Glucose.: 32 mg/dL (27 Nov 2017 06:56)      RADIOLOGY & ADDITIONAL TESTS:  pending final CTA read
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
## 2017-11-27 NOTE — CONSULT NOTE ADULT - PROBLEM SELECTOR RECOMMENDATION 3
Anemia of chronic renal disease with hemolgobin 8.8 at present.  Obtain Iron studies.  EPO during HD treatment once BP controlled.

## 2017-11-27 NOTE — PROGRESS NOTE ADULT - SUBJECTIVE AND OBJECTIVE BOX
24hr Events:  O/N: Admitted, 2am K+ 6, given calcium gluconate, insulin, D50, and kayexalate, ekg, no peaked T waves, NPO for possible OR for fistulogram      Assessment/Plan:  Pt is a 38 y/o M with ESRD on HD (M/W/F - last full dialysis 11/10/17 ), malignant HTN, s/p Type A dissection repair with Dr. Moreno in 2015, pericardial window, sternal wound reconstruction with pec flap, and type B dissection repair at [facility] in 04/2016, LUE steal syndrome associated with the AV fistula, s/p Left AVF ligation and now with Right brachiocephalic fistula with vein patch 6/22/17 and recent RUE fistulogram 11/14/17 due to bleeding from fistula now with c/o of recurrent bleeding from right upper AVF after dialysis and cough    NPO   Home medication  Renal consult  Attempt dialysis today  Possible fistulogram if bleeding occurs with HD SUBJECTIVE: Patient seen and examined bedside by chief resident. O/N: Admitted, 2am K+ 6, given calcium gluconate, insulin, D50, and kayexalate, ekg, no peaked T waves, NPO for possible OR for fistulogram. Denies chest pain, shortness of breath, abdominal pain on morning rounds.     Vital Signs Last 24 Hrs  T(C): 35.8 (27 Nov 2017 09:51), Max: 37.1 (26 Nov 2017 16:50)  T(F): 96.5 (27 Nov 2017 09:51), Max: 98.8 (26 Nov 2017 16:50)  HR: 92 (27 Nov 2017 09:54) (54 - 92)  BP: 167/100 (27 Nov 2017 09:54) (147/83 - 171/103)  BP(mean): 120 (27 Nov 2017 09:54) (120 - 133)  RR: 26 (27 Nov 2017 09:54) (16 - 26)  SpO2: 100% (27 Nov 2017 09:54) (96% - 100%)  I&O's Detail    27 Nov 2017 07:01  -  27 Nov 2017 10:30  --------------------------------------------------------  IN:    dextrose 10%.: 40 mL  Total IN: 40 mL    OUT:  Total OUT: 0 mL    Total NET: 40 mL    General: NAD, resting comfortably in bed  Pulm: Nonlabored breathing, no respiratory distress  Extrem: RUE with palp radial pulse, thrill throughout fistula, pulsatile distally to fistula with decreased pulsatility proximally        LABS:                        8.8    6.1   )-----------( 124      ( 27 Nov 2017 08:11 )             27.1     11-27    139  |  93<L>  |  82<H>  ----------------------------<  283<H>  4.2   |  22  |  12.32<H>    Ca    9.7      27 Nov 2017 08:10  Phos  8.6     11-27  Mg     2.3     11-27    TPro  8.0  /  Alb  4.7  /  TBili  1.0  /  DBili  x   /  AST  15  /  ALT  9<L>  /  AlkPhos  48  11-27    PT/INR - ( 27 Nov 2017 08:10 )   PT: 12.7 sec;   INR: 1.14          PTT - ( 27 Nov 2017 08:10 )  PTT:48.6 sec      RADIOLOGY & ADDITIONAL STUDIES:

## 2017-11-27 NOTE — CONSULT NOTE ADULT - PROBLEM SELECTOR RECOMMENDATION 5
RUE AV fistula with bleeding plan for AV fistulogram today with Dr. Sal. RUE AV fistula with bleeding plan for AV fistulogram today with Dr. Ingram.

## 2017-11-27 NOTE — PROGRESS NOTE ADULT - SUBJECTIVE AND OBJECTIVE BOX
Patient was seen and evaluated on dialysis.   Patient is tolerating the procedure well.   HR: 72 (11-27-17 @ 13:00)  BP: 165/93 (11-27-17 @ 13:00) pusle 65, /67  Continue dialysis:   Dialyzer:  200        QB:   400     QD: 700  Goal UF 4 liters  over 4 Hours

## 2017-11-27 NOTE — PROGRESS NOTE ADULT - ASSESSMENT
Assessment/Plan:  Pt is a 38 y/o M with ESRD on HD (M/W/F - last full dialysis 11/10/17 ), malignant HTN, s/p Type A dissection repair with Dr. Moreno in 2015, pericardial window, sternal wound reconstruction with pec flap, and type B dissection repair at [facility] in 04/2016, LUE steal syndrome associated with the AV fistula, s/p Left AVF ligation and now with Right brachiocephalic fistula with vein patch 6/22/17 and recent RUE fistulogram 11/14/17 due to bleeding from fistula now with c/o of recurrent bleeding from right upper AVF after dialysis and cough    NPO   Home medication  Renal consult  Attempt dialysis today  Possible fistulogram if bleeding occurs with HD

## 2017-11-27 NOTE — CONSULT NOTE ADULT - PROBLEM SELECTOR RECOMMENDATION 4
Calcium 9.1, Phosphorus 8.6 at present.  Continue sevelamar for now  Obtain Vitamin D 25, Vitamin D 1,25 and Intact PTH level.   Low phosphorus diet.

## 2017-11-27 NOTE — PROVIDER CONTACT NOTE (CHANGE IN STATUS NOTIFICATION) - ACTION/TREATMENT ORDERED:
Dextrose 50% administered as per MD Jay, symptoms resolved. Upon reassessment, pt's /74, HR 66 and , notified MD Jay and vascular team. Will continue to monitor pt.

## 2017-11-27 NOTE — PROVIDER CONTACT NOTE (CHANGE IN STATUS NOTIFICATION) - ASSESSMENT
Pt sitting up at side of bed, sweating, shaking, /95, HR 69.  FS 32.  Pt on 2 L NC O2, no signs of respiratory distress noted.

## 2017-11-27 NOTE — CONSULT NOTE ADULT - PROBLEM SELECTOR RECOMMENDATION 9
ESRD on HD MWF through RUE AV fistula got partial HD treatment due to AVF bleeding now admitted for possible fistulogram.  Will do HD treatment today as per schedule for UF and clearances.  Low k/Low phos/renal diet.

## 2017-11-27 NOTE — CONSULT NOTE ADULT - SUBJECTIVE AND OBJECTIVE BOX
HPI: 37yMale with ESRD on HD (M/W/F - last full dialysis 11/10/17 ), malignant HTN, Type A dissection repair (Dr. Mroeno, 2015) c/b wound infection s/p sternal wound reconstruction with pectoralis flap, Type B dissection repair ([facility], 04/2016), LUE steal syndrome associated with the AV fistula s/p AVF ligation, and right brachiocephalic fistula creation with vein patch (6/22/17) presented to [facility] ED with complaints of cough, fever, and bleeding from AVF site after dialysis on two occasions. Pt was recently admitted with similar complaint s/p fistulogram and balloon angioplasty (11/15/17). Associated symptoms include has dry cough x4 days, chills, and fevers. He was admitted for evaluation of bleeding AVF, anemia, and hyperkalemia.    This morning, pt became acutely diaphoretic and HTNsive with SBP in 200s. He was given hydralazine 20mg and labetalol 10mg. He was also given insulin, kayexalate, and calcium gluconate for hyperkalemia (K 6.0, no EKG changes except for baseline ST segment elevation). Pt then became hypoglycemic with FS in 30s, started on D10 gtt after D50 bolus.     Pt underwent CTA with concern for recurrent dissection.    At time of evaluation on transfer to SICU, pt endorses left-sided abdominal/flank pain and some SOB, though improved after nebulizer. Denies CP, nausea, blurry vision. Requesting Dilaudid and Benadryl.      ICU Vital Signs Last 24 Hrs  T(F): 96.5 (11-27-17 @ 09:51), Max: 98.8 (11-26-17 @ 16:50)  HR: 92 (11-27-17 @ 09:54) (54 - 92)  BP: 167/100 (11-27-17 @ 09:54) (147/83 - 171/103)  BP(mean): 120 (11-27-17 @ 09:54) (120 - 133)  ABP: --  RR: 26 (11-27-17 @ 09:54) (16 - 26)  SpO2: 100% (11-27-17 @ 09:54) (96% - 100%)    PHYSICAL EXAM:     Neurological: AAOx3, CNII-XII intact,  strength 5/5 b/l. PERRL, EOMI.  Cardiovascular: RRR, harsh systolic murmur auscultated best at LSB.  Respiratory: Inspiratory wheeze and rhonchi heard bilaterally, decreased breath sounds on left side. No respiratory distress  Gastrointestinal: soft, ND. Tender to palpation across left hemiabdomen. No rebound or guarding.  Extremities: warm, no dependent edema. Gross deformity bilaterally on medial aspect of lower legs with well healed surgical scars  Vascular: no cyanosis/erythema. Palpable radial and DPs  Skin: Warm, dry. Well healed sternotomy scar with palpable defect in cephalad portion of sternum. Well healed abdominal midline and LUQ subcostal incisions. Well healed lower leg surgical scars. Ashy skin.    LABS:    11-27    139  |  93<L>  |  82<H>  ----------------------------<  283<H>  4.2   |  22  |  12.32<H>    Ca    9.7      27 Nov 2017 08:10  Phos  8.6     11-27  Mg     2.3     11-27    TPro  8.0  /  Alb  4.7  /  TBili  1.0  /  DBili  x   /  AST  15  /  ALT  9<L>  /  AlkPhos  48  11-27  LIVER FUNCTIONS - ( 27 Nov 2017 08:10 )  Alb: 4.7 g/dL / Pro: 8.0 g/dL / ALK PHOS: 48 U/L / ALT: 9 U/L / AST: 15 U/L / GGT: x                               8.8    6.1   )-----------( 124      ( 27 Nov 2017 08:11 )             27.1   PT/INR - ( 27 Nov 2017 08:10 )   PT: 12.7 sec;   INR: 1.14          PTT - ( 27 Nov 2017 08:10 )  PTT:48.6 secCARDIAC MARKERS ( 27 Nov 2017 08:10 )  x     / 0.18 ng/mL / 87 U/L / x     / 2.2 ng/mL    CAPILLARY BLOOD GLUCOSE      POCT Blood Glucose.: 87 mg/dL (27 Nov 2017 10:08)  POCT Blood Glucose.: 120 mg/dL (27 Nov 2017 08:35)  POCT Blood Glucose.: 87 mg/dL (27 Nov 2017 08:12)  POCT Blood Glucose.: 168 mg/dL (27 Nov 2017 07:57)  POCT Blood Glucose.: 45 mg/dL (27 Nov 2017 07:51)  POCT Blood Glucose.: 103 mg/dL (27 Nov 2017 07:10)  POCT Blood Glucose.: 32 mg/dL (27 Nov 2017 06:56)    Merino:	  [x ] None	[ ] Daily Merino Order Placed	   Indication:	  [ ] Strict I and O's    [ ] Obstruction     [ ] Incontinence + Stage 3 or 4 Decubitus  Central Line:  [x ] None	   [ ]  Medication / TPN Administration     [ ] No Peripheral IV

## 2017-11-27 NOTE — PROGRESS NOTE ADULT - ASSESSMENT
Assessment/Plan:  Pt is a 36 y/o M with ESRD on HD (M/W/F - last full dialysis 11/10/17 ), malignant HTN, s/p Type A dissection repair with Dr. Moreno in 2015, pericardial window, sternal wound reconstruction with pec flap, and type B dissection repair at [facility] in 04/2016, LUE steal syndrome associated with the AV fistula, s/p Left AVF ligation and now with Right brachiocephalic fistula with vein patch 6/22/17 and recent RUE fistulogram 11/14/17 due to bleeding from fistula now with c/o of recurrent bleeding from right upper AVF after dialysis and cough    Transferred to SICU given back pain, hypertensive emergency, and concern for recurrent aortic dissection.     Neuro: pain control with Dilaudid and Ofirmev  Resp: NC, goal O2 sat , prn Duonebs  CV: SBP goal 120-150. PRN labetalol, however if refractory, may need esmolol drip.  GI: NPO   Renal: HD stat, qMWF, f/u Renal consult. Possible fistulogram at later date if bleeding occurs with HD  Endo: ISS  ID: N/A  Heme/onc: SQH, ASA Assessment/Plan:  Pt is a 38 y/o M with ESRD on HD (M/W/F - last full dialysis 11/10/17 ), malignant HTN, s/p Type A dissection repair with Dr. Moreno in 2015, pericardial window, sternal wound reconstruction with pec flap, and type B dissection repair at [facility] in 04/2016, LUE steal syndrome associated with the AV fistula, s/p Left AVF ligation and now with Right brachiocephalic fistula with vein patch 6/22/17 and recent RUE fistulogram 11/14/17 due to bleeding from fistula now with c/o of recurrent bleeding from right upper AVF after dialysis and cough    Transferred to SICU given back pain, hypertensive emergency, and concern for recurrent aortic dissection.     Neuro: pain control with Dilaudid and Ofirmev  Resp: NC, goal O2 sat , prn Duonebs.   CV: SBP goal 120-150. PRN labetalol, however if refractory, may need esmolol drip. Elevated troponin (0.11) during RR this morning, trend Q8H x 2  GI: NPO   Renal: HD stat, qMWF, f/u Renal consult. Possible fistulogram at later date if bleeding occurs with HD  Endo: ISS  ID: N/A  Heme/onc: SQH, ASA Assessment/Plan:    36 yo M with ESRD on HD, malignant HTN, and h/o Type A (2014) and B (2016) aortic dissection repairs, transferred to SICU given back pain, hypertensive emergency, and concern for recurrent aortic dissection. Prelim CTA read shows no new dissection flaps; dissection flaps in bilateral lower extremity vasculature are chronic and unchanged.    Neuro: pain control with prn Dilaudid and Ofirmev  Resp: NC, goal O2 sat , prn Duonebs.   CV: SBP goal 110-120. start esmolol drip. Elevated troponin (0.11) during RR this morning, trend Q8H x 2  GI: NPO   Renal: HD stat, qMWF, f/u Renal consult. Possible fistulogram at later date if bleeding occurs with HD  Endo: continue D10 drip through HD. Check FS at end of HD; if normal, then stop D10  ID: N/A  Heme/onc: SQH, ASA  rest of care per SICU  call vascular pager with questions 36 yo M with ESRD on HD, malignant HTN, and h/o Type A (2014) and B (2016) aortic dissection repairs, transferred to SICU given back pain, hypertensive emergency, and concern for recurrent aortic dissection. Prelim CTA read shows no new dissection flaps; dissection flaps in bilateral lower extremity vasculature are chronic and unchanged. However, episode of hypertensive urgency likely caused some pulmonary edema, warranting immediate HD.    Neuro: pain control with prn Dilaudid and Ofirmev  Resp: NC, goal O2 sat , prn Duonebs. BiPAP on standby  CV: SBP goal 110-120. Start esmolol drip. Elevated troponin (0.11) during RR this morning, trend Q8H x 2. Hold oral home BP meds. F/u CTA final read.  GI: NPO   Renal: HD stat, qMWF, f/u Renal consult. Possible fistulogram at later date if bleeding occurs with HD  Endo: continue D10 drip through HD. Check FS at end of HD; if normal, then stop D10  ID: N/A  Heme: SQH, ASA    - rest of care per SICU  - call vascular pager () with questions 36 yo M with ESRD on HD, malignant HTN, and h/o Type A (2014) and B (2016) aortic dissection repairs, transferred to SICU given back pain, hypertensive emergency, and concern for recurrent aortic dissection. Prelim CTA read shows no new dissection flaps; dissection flaps in bilateral lower extremity vasculature are chronic and unchanged. However, episode of hypertensive urgency likely caused some pulmonary edema, warranting immediate HD.    Neuro: pain control with prn Dilaudid and Ofirmev  Resp: NC, goal O2 sat , prn Duonebs. BiPAP on standby  CV: SBP goal 110-120. Start esmolol drip. Elevated troponin (0.11) during RR this morning, trend Q8H x 2. Hold oral home BP meds. F/u CTA final read. STAT Echo.  GI: NPO   Renal: HD stat, qMWF, f/u Renal consult. Possible fistulogram at later date if bleeding occurs with HD  Endo: continue D10 drip through HD. Check FS at end of HD; if normal, then stop D10  ID: N/A  Heme: SQH, ASA    - rest of care per SICU  - call vascular pager () with questions

## 2017-11-27 NOTE — CONSULT NOTE ADULT - ASSESSMENT
Pt is a 36 y/o M with ESRD on HD (M/W/F - last full dialysis 11/10/17 ), malignant HTN, s/p Type A dissection repair with Dr. Moreno in 2015, pericardial window, sternal wound reconstruction with pec flap, and type B dissection repair at [facility] in 04/2016, LUE steal syndrome associated with the AV fistula, s/p Left AVF ligation and s/p Right brachiocephalic fistula with vein patch 6/22/17 who presented to [facility] ED with complain of cough, fever and bleeding from AVF site after dialysis on two occasions. Nephrology consult called for HD treatment.

## 2017-11-28 ENCOUNTER — TRANSCRIPTION ENCOUNTER (OUTPATIENT)
Age: 37
End: 2017-11-28

## 2017-11-28 VITALS
HEART RATE: 60 BPM | OXYGEN SATURATION: 100 % | SYSTOLIC BLOOD PRESSURE: 139 MMHG | DIASTOLIC BLOOD PRESSURE: 82 MMHG | RESPIRATION RATE: 19 BRPM

## 2017-11-28 LAB
ANION GAP SERPL CALC-SCNC: 18 MMOL/L — HIGH (ref 5–17)
BUN SERPL-MCNC: 38 MG/DL — HIGH (ref 7–23)
CALCIUM SERPL-MCNC: 9.2 MG/DL — SIGNIFICANT CHANGE UP (ref 8.4–10.5)
CHLORIDE SERPL-SCNC: 94 MMOL/L — LOW (ref 96–108)
CO2 SERPL-SCNC: 26 MMOL/L — SIGNIFICANT CHANGE UP (ref 22–31)
CREAT SERPL-MCNC: 7.8 MG/DL — HIGH (ref 0.5–1.3)
GLUCOSE BLDC GLUCOMTR-MCNC: 120 MG/DL — HIGH (ref 70–99)
GLUCOSE BLDC GLUCOMTR-MCNC: 77 MG/DL — SIGNIFICANT CHANGE UP (ref 70–99)
GLUCOSE SERPL-MCNC: 89 MG/DL — SIGNIFICANT CHANGE UP (ref 70–99)
HBA1C BLD-MCNC: 4.4 % — SIGNIFICANT CHANGE UP (ref 4–5.6)
HCT VFR BLD CALC: 25.7 % — LOW (ref 39–50)
HGB BLD-MCNC: 8.3 G/DL — LOW (ref 13–17)
MAGNESIUM SERPL-MCNC: 2.2 MG/DL — SIGNIFICANT CHANGE UP (ref 1.6–2.6)
MCHC RBC-ENTMCNC: 31 PG — SIGNIFICANT CHANGE UP (ref 27–34)
MCHC RBC-ENTMCNC: 32.3 G/DL — SIGNIFICANT CHANGE UP (ref 32–36)
MCV RBC AUTO: 95.9 FL — SIGNIFICANT CHANGE UP (ref 80–100)
PHOSPHATE SERPL-MCNC: 7.9 MG/DL — HIGH (ref 2.5–4.5)
PLATELET # BLD AUTO: 79 K/UL — LOW (ref 150–400)
POTASSIUM SERPL-MCNC: 4.8 MMOL/L — SIGNIFICANT CHANGE UP (ref 3.5–5.3)
POTASSIUM SERPL-SCNC: 4.8 MMOL/L — SIGNIFICANT CHANGE UP (ref 3.5–5.3)
RBC # BLD: 2.68 M/UL — LOW (ref 4.2–5.8)
RBC # FLD: 15.6 % — SIGNIFICANT CHANGE UP (ref 10.3–16.9)
SODIUM SERPL-SCNC: 138 MMOL/L — SIGNIFICANT CHANGE UP (ref 135–145)
TROPONIN T SERPL-MCNC: 0.19 NG/ML — CRITICAL HIGH (ref 0–0.01)
WBC # BLD: 3.3 K/UL — LOW (ref 3.8–10.5)
WBC # FLD AUTO: 3.3 K/UL — LOW (ref 3.8–10.5)

## 2017-11-28 PROCEDURE — 36415 COLL VENOUS BLD VENIPUNCTURE: CPT

## 2017-11-28 PROCEDURE — 85730 THROMBOPLASTIN TIME PARTIAL: CPT

## 2017-11-28 PROCEDURE — 80053 COMPREHEN METABOLIC PANEL: CPT

## 2017-11-28 PROCEDURE — 96374 THER/PROPH/DIAG INJ IV PUSH: CPT

## 2017-11-28 PROCEDURE — 87040 BLOOD CULTURE FOR BACTERIA: CPT

## 2017-11-28 PROCEDURE — 84484 ASSAY OF TROPONIN QUANT: CPT

## 2017-11-28 PROCEDURE — 90935 HEMODIALYSIS ONE EVALUATION: CPT

## 2017-11-28 PROCEDURE — 83735 ASSAY OF MAGNESIUM: CPT

## 2017-11-28 PROCEDURE — 93306 TTE W/DOPPLER COMPLETE: CPT

## 2017-11-28 PROCEDURE — 99231 SBSQ HOSP IP/OBS SF/LOW 25: CPT | Mod: GC

## 2017-11-28 PROCEDURE — 82962 GLUCOSE BLOOD TEST: CPT

## 2017-11-28 PROCEDURE — 87798 DETECT AGENT NOS DNA AMP: CPT

## 2017-11-28 PROCEDURE — 83550 IRON BINDING TEST: CPT

## 2017-11-28 PROCEDURE — 94640 AIRWAY INHALATION TREATMENT: CPT

## 2017-11-28 PROCEDURE — 85027 COMPLETE CBC AUTOMATED: CPT

## 2017-11-28 PROCEDURE — 71045 X-RAY EXAM CHEST 1 VIEW: CPT

## 2017-11-28 PROCEDURE — 86900 BLOOD TYPING SEROLOGIC ABO: CPT

## 2017-11-28 PROCEDURE — 82553 CREATINE MB FRACTION: CPT

## 2017-11-28 PROCEDURE — 87581 M.PNEUMON DNA AMP PROBE: CPT

## 2017-11-28 PROCEDURE — 99232 SBSQ HOSP IP/OBS MODERATE 35: CPT | Mod: GC

## 2017-11-28 PROCEDURE — 84100 ASSAY OF PHOSPHORUS: CPT

## 2017-11-28 PROCEDURE — 87486 CHLMYD PNEUM DNA AMP PROBE: CPT

## 2017-11-28 PROCEDURE — 86850 RBC ANTIBODY SCREEN: CPT

## 2017-11-28 PROCEDURE — 85379 FIBRIN DEGRADATION QUANT: CPT

## 2017-11-28 PROCEDURE — 80307 DRUG TEST PRSMV CHEM ANLYZR: CPT

## 2017-11-28 PROCEDURE — 85610 PROTHROMBIN TIME: CPT

## 2017-11-28 PROCEDURE — 71275 CT ANGIOGRAPHY CHEST: CPT

## 2017-11-28 PROCEDURE — 83036 HEMOGLOBIN GLYCOSYLATED A1C: CPT

## 2017-11-28 PROCEDURE — 87633 RESP VIRUS 12-25 TARGETS: CPT

## 2017-11-28 PROCEDURE — 71046 X-RAY EXAM CHEST 2 VIEWS: CPT

## 2017-11-28 PROCEDURE — 86901 BLOOD TYPING SEROLOGIC RH(D): CPT

## 2017-11-28 PROCEDURE — 75635 CT ANGIO ABDOMINAL ARTERIES: CPT

## 2017-11-28 PROCEDURE — 83605 ASSAY OF LACTIC ACID: CPT

## 2017-11-28 PROCEDURE — 82728 ASSAY OF FERRITIN: CPT

## 2017-11-28 PROCEDURE — 82550 ASSAY OF CK (CPK): CPT

## 2017-11-28 PROCEDURE — 99285 EMERGENCY DEPT VISIT HI MDM: CPT | Mod: 25

## 2017-11-28 PROCEDURE — 80048 BASIC METABOLIC PNL TOTAL CA: CPT

## 2017-11-28 PROCEDURE — 93005 ELECTROCARDIOGRAM TRACING: CPT

## 2017-11-28 RX ORDER — SEVELAMER CARBONATE 2400 MG/1
2400 POWDER, FOR SUSPENSION ORAL
Qty: 0 | Refills: 0 | Status: DISCONTINUED | OUTPATIENT
Start: 2017-11-28 | End: 2017-11-28

## 2017-11-28 RX ORDER — DIPHENHYDRAMINE HCL 50 MG
25 CAPSULE ORAL EVERY 6 HOURS
Qty: 0 | Refills: 0 | Status: DISCONTINUED | OUTPATIENT
Start: 2017-11-28 | End: 2017-11-28

## 2017-11-28 RX ADMIN — HYDROMORPHONE HYDROCHLORIDE 1 MILLIGRAM(S): 2 INJECTION INTRAMUSCULAR; INTRAVENOUS; SUBCUTANEOUS at 05:13

## 2017-11-28 RX ADMIN — HYDROMORPHONE HYDROCHLORIDE 1 MILLIGRAM(S): 2 INJECTION INTRAMUSCULAR; INTRAVENOUS; SUBCUTANEOUS at 04:08

## 2017-11-28 RX ADMIN — HYDROMORPHONE HYDROCHLORIDE 0.5 MILLIGRAM(S): 2 INJECTION INTRAMUSCULAR; INTRAVENOUS; SUBCUTANEOUS at 00:27

## 2017-11-28 RX ADMIN — HYDROMORPHONE HYDROCHLORIDE 1 MILLIGRAM(S): 2 INJECTION INTRAMUSCULAR; INTRAVENOUS; SUBCUTANEOUS at 09:05

## 2017-11-28 RX ADMIN — HYDROMORPHONE HYDROCHLORIDE 1 MILLIGRAM(S): 2 INJECTION INTRAMUSCULAR; INTRAVENOUS; SUBCUTANEOUS at 15:08

## 2017-11-28 RX ADMIN — Medication 81 MILLIGRAM(S): at 11:34

## 2017-11-28 RX ADMIN — SEVELAMER CARBONATE 1600 MILLIGRAM(S): 2400 POWDER, FOR SUSPENSION ORAL at 08:37

## 2017-11-28 RX ADMIN — HEPARIN SODIUM 5000 UNIT(S): 5000 INJECTION INTRAVENOUS; SUBCUTANEOUS at 13:20

## 2017-11-28 RX ADMIN — HYDROMORPHONE HYDROCHLORIDE 1 MILLIGRAM(S): 2 INJECTION INTRAMUSCULAR; INTRAVENOUS; SUBCUTANEOUS at 00:36

## 2017-11-28 RX ADMIN — HYDROMORPHONE HYDROCHLORIDE 0.5 MILLIGRAM(S): 2 INJECTION INTRAMUSCULAR; INTRAVENOUS; SUBCUTANEOUS at 11:34

## 2017-11-28 RX ADMIN — GABAPENTIN 300 MILLIGRAM(S): 400 CAPSULE ORAL at 11:34

## 2017-11-28 RX ADMIN — HYDROMORPHONE HYDROCHLORIDE 0.5 MILLIGRAM(S): 2 INJECTION INTRAMUSCULAR; INTRAVENOUS; SUBCUTANEOUS at 12:15

## 2017-11-28 RX ADMIN — HYDROMORPHONE HYDROCHLORIDE 1 MILLIGRAM(S): 2 INJECTION INTRAMUSCULAR; INTRAVENOUS; SUBCUTANEOUS at 07:36

## 2017-11-28 RX ADMIN — FAMOTIDINE 20 MILLIGRAM(S): 10 INJECTION INTRAVENOUS at 11:34

## 2017-11-28 RX ADMIN — HEPARIN SODIUM 5000 UNIT(S): 5000 INJECTION INTRAVENOUS; SUBCUTANEOUS at 05:13

## 2017-11-28 RX ADMIN — CARVEDILOL PHOSPHATE 50 MILLIGRAM(S): 80 CAPSULE, EXTENDED RELEASE ORAL at 05:13

## 2017-11-28 RX ADMIN — HYDROMORPHONE HYDROCHLORIDE 1 MILLIGRAM(S): 2 INJECTION INTRAMUSCULAR; INTRAVENOUS; SUBCUTANEOUS at 08:37

## 2017-11-28 RX ADMIN — LOSARTAN POTASSIUM 100 MILLIGRAM(S): 100 TABLET, FILM COATED ORAL at 05:13

## 2017-11-28 RX ADMIN — Medication 100 MILLIGRAM(S): at 05:13

## 2017-11-28 RX ADMIN — ALBUTEROL 2.5 MILLIGRAM(S): 90 AEROSOL, METERED ORAL at 13:20

## 2017-11-28 RX ADMIN — HYDROMORPHONE HYDROCHLORIDE 1 MILLIGRAM(S): 2 INJECTION INTRAMUSCULAR; INTRAVENOUS; SUBCUTANEOUS at 14:51

## 2017-11-28 RX ADMIN — Medication 25 MILLIGRAM(S): at 09:30

## 2017-11-28 RX ADMIN — SEVELAMER CARBONATE 2400 MILLIGRAM(S): 2400 POWDER, FOR SUSPENSION ORAL at 12:45

## 2017-11-28 RX ADMIN — ISOSORBIDE MONONITRATE 60 MILLIGRAM(S): 60 TABLET, EXTENDED RELEASE ORAL at 11:34

## 2017-11-28 RX ADMIN — Medication 0.1 MILLIGRAM(S): at 05:13

## 2017-11-28 NOTE — PROGRESS NOTE ADULT - SUBJECTIVE AND OBJECTIVE BOX
KELLY GROVE   MRN-[id number]  37y/M    Overnight Events:  Started on Cardene gtt last night for hypertension to 180's. BP initially responded appropriately, but then patient became hypotensive to 80's. Dc'd cardene and held home BP medication.  In AM, patient seen at bedside. No complaints other than some mild lightheadedness during hypotensive period overnight and some persistent abdominal discomfort around left flank. Otherwise, denies chest pain, sob, n/v, fevers, or chills.      VITALS  T(F): , Max: 99.6 (11-27 @ 22:25)  HR:  (62 - 104)  BP:  (84/54 - 185/99)  ABP(mean): --  RR:  (13 - 57)  SpO2:  (96% - 100%)  CVP(mm Hg): --  PCWP: --    11-27-17 @ 07:01  -  11-28-17 @ 07:00  --------------------------------------------------------  IN: 0 mL / OUT: 4000 mL / NET: -4000 mL            PHYSICAL EXAM:  NEURO: Alert and oriented x3; CNs grossly intact;   HEENT: NC/AT, no JVD  Cardio: normotensive this morning to 110; prominent systolic murmur  Resp: no resp, distress; coughing with deep inspiration; bilateral rhonchi appreciated with inspiration  GI: soft, non distended; well healed midline scar; NTTP; no guarding  Ext: warm, well-perfused, motor and sensory intact in both upper and lower ext  Vasc: palp DP/PT bilaterally; palp radial bilaterally; RUE fistula: good thrill palpable          LABS:  CAPILLARY BLOOD GLUCOSE      POCT Blood Glucose.: 77 mg/dL (28 Nov 2017 04:16)  POCT Blood Glucose.: 124 mg/dL (27 Nov 2017 21:11)  POCT Blood Glucose.: 92 mg/dL (27 Nov 2017 14:57)  POCT Blood Glucose.: 91 mg/dL (27 Nov 2017 11:15)  POCT Blood Glucose.: 87 mg/dL (27 Nov 2017 10:08)  POCT Blood Glucose.: 120 mg/dL (27 Nov 2017 08:35)  POCT Blood Glucose.: 87 mg/dL (27 Nov 2017 08:12)                          8.3    3.3   )-----------( 79       ( 28 Nov 2017 04:44 )             25.7     11-28    138  |  94<L>  |  38<H>  ----------------------------<  89  4.8   |  26  |  7.80<H>    Ca    9.2      28 Nov 2017 04:44  Phos  7.9     11-28  Mg     2.2     11-28    TPro  8.0  /  Alb  4.7  /  TBili  1.0  /  DBili  x   /  AST  15  /  ALT  9<L>  /  AlkPhos  48  11-27 11-27 @ 07:01  -  11-28 @ 07:00  --------------------------------------------------------  IN: 902.7 mL / OUT: 4000 mL / NET: -3097.3 mL        Bacteriology:      Imaging:    MEDICATIONS:  MEDICATIONS  (STANDING):  aspirin  chewable 81 milliGRAM(s) Oral daily  atorvastatin 10 milliGRAM(s) Oral at bedtime  carvedilol 50 milliGRAM(s) Oral every 12 hours  cloNIDine 0.1 milliGRAM(s) Oral two times a day  dextrose 5%. 1000 milliLiter(s) (50 mL/Hr) IV Continuous <Continuous>  dextrose 50% Injectable 12.5 Gram(s) IV Push once  dextrose 50% Injectable 25 Gram(s) IV Push once  dextrose 50% Injectable 25 Gram(s) IV Push once  docusate sodium 100 milliGRAM(s) Oral three times a day  doxazosin 2 milliGRAM(s) Oral at bedtime  famotidine    Tablet 20 milliGRAM(s) Oral daily  gabapentin 300 milliGRAM(s) Oral daily  heparin  Injectable 5000 Unit(s) SubCutaneous every 8 hours  insulin lispro (HumaLOG) corrective regimen sliding scale   SubCutaneous Before meals and at bedtime  isosorbide   mononitrate ER Tablet (IMDUR) 60 milliGRAM(s) Oral daily  losartan 100 milliGRAM(s) Oral daily  sevelamer hydrochloride 1600 milliGRAM(s) Oral three times a day with meals    MEDICATIONS  (PRN):  ALBUTerol    0.083% 2.5 milliGRAM(s) Nebulizer every 6 hours PRN Shortness of Breath and/or Wheezing  dextrose Gel 1 Dose(s) Oral once PRN Blood Glucose LESS THAN 70 milliGRAM(s)/deciliter  diphenhydrAMINE   Injectable 25 milliGRAM(s) IV Push every 6 hours PRN Itching  glucagon  Injectable 1 milliGRAM(s) IntraMuscular once PRN Glucose LESS THAN 70 milligrams/deciliter  HYDROmorphone  Injectable 1 milliGRAM(s) IV Push every 4 hours PRN Severe Pain (7 - 10)  HYDROmorphone  Injectable 0.5 milliGRAM(s) IV Push every 4 hours PRN Moderate Pain (4 - 6)    IV FLUIDS:  DRIPS:  DIET:  Lines:  Drains:    11-27-17 @ 07:01  -  11-28-17 @ 07:00  --------------------------------------------------------  OUT:    Other: 4000 mL  Total OUT: 4000 mL    Total NET: -3097.3 mL        Wounds:    CODE STATUS:  full code

## 2017-11-28 NOTE — PROGRESS NOTE ADULT - ASSESSMENT
36 yo M with ESRD on HD, malignant HTN, and h/o Type A (2014) and B (2016) aortic dissection repairs, admitted for post-dialysis bleeding from AVF, now improved. Was likely secondary to location of HD catheter insertion into pseudoaneurysm.     - step down from ICU today  - continue home anti-hypertensive regimen  - regular diet  - HSQ, aspirin   - fistulogram cancelled as bleeding has resolved  - likely discharge home tomorrow 38 yo M with ESRD on HD, malignant HTN, and h/o Type A (2014) and B (2016) aortic dissection repairs, admitted for post-dialysis bleeding from AVF, now improved. Was likely secondary to location of HD catheter insertion into pseudoaneurysm.     - step down from ICU today  - continue home anti-hypertensive regimen  - regular diet  - HSQ, aspirin   - fistulogram cancelled as bleeding has resolved  - possible discharge home this afternoon if doing well

## 2017-11-28 NOTE — DISCHARGE NOTE ADULT - MEDICATION SUMMARY - MEDICATIONS TO STOP TAKING
I will STOP taking the medications listed below when I get home from the hospital:    minoxidil 10 mg oral tablet  -- 1 tab(s) by mouth 3 times a day I will STOP taking the medications listed below when I get home from the hospital:    amLODIPine 10 mg oral tablet  -- 1 tab(s) by mouth once a day    doxazosin 2 mg oral tablet  -- 1 tab(s) by mouth once a day (at bedtime)    minoxidil 10 mg oral tablet  -- 1 tab(s) by mouth 3 times a day

## 2017-11-28 NOTE — PROGRESS NOTE ADULT - ASSESSMENT
Pt is a 38 y/o M with ESRD on HD (M/W/F - last full dialysis 11/10/17 ), malignant HTN, s/p Type A dissection repair with Dr. Moreno in 2015, pericardial window, sternal wound reconstruction with pec flap, and type B dissection repair at [facility] in 04/2016, LUE steal syndrome associated with the AV fistula, s/p Left AVF ligation and s/p Right brachiocephalic fistula with vein patch 6/22/17 who presented to [facility] ED with complain of cough, fever and bleeding from AVF site after dialysis on two occasions. Nephrology consult called for HD treatment.

## 2017-11-28 NOTE — DISCHARGE NOTE ADULT - MEDICATION SUMMARY - MEDICATIONS TO TAKE
I will START or STAY ON the medications listed below when I get home from the hospital:    aspirin 81 mg oral delayed release tablet  -- 1 tab(s) by mouth once a day  -- Indication: For ppx    Percocet 5/325 oral tablet  -- 1 tab(s) by mouth every 6 hours, As Needed MDD:4 tabs  -- Caution federal law prohibits the transfer of this drug to any person other  than the person for whom it was prescribed.  May cause drowsiness.  Alcohol may intensify this effect.  Use care when operating dangerous machinery.  This prescription cannot be refilled.  This product contains acetaminophen.  Do not use  with any other product containing acetaminophen to prevent possible liver damage.  Using more of this medication than prescribed may cause serious breathing problems.    -- Indication: For pain control    losartan 50 mg oral tablet  -- 2 tab(s) by mouth once a day  -- Do not take this drug if you are pregnant.  It is very important that you take or use this exactly as directed.  Do not skip doses or discontinue unless directed by your doctor.  Some non-prescription drugs may aggravate your condition.  Read all labels carefully.  If a warning appears, check with your doctor before taking.    -- Indication: For HTN    cloNIDine 0.1 mg oral tablet  -- 1 tab(s) by mouth 2 times a day  -- Indication: For HTN    doxazosin 2 mg oral tablet  -- 1 tab(s) by mouth once a day (at bedtime)  -- Indication: For HTN    isosorbide mononitrate 60 mg oral tablet, extended release  -- 1 tab(s) by mouth once a day  -- Indication: For HTN    gabapentin 300 mg oral capsule  -- 1 cap(s) by mouth once a day  -- It is very important that you take or use this exactly as directed.  Do not skip doses or discontinue unless directed by your doctor.  May cause drowsiness.  Alcohol may intensify this effect.  Use care when operating dangerous machinery.    -- Indication: For pain control    diphenhydrAMINE 50 mg oral capsule  -- 1 cap(s) by mouth once a day (at bedtime), As needed, Insomnia  -- Indication: For pruritis    atorvastatin 10 mg oral tablet  -- 1 tab(s) by mouth once a day (at bedtime)  -- Indication: For HLD    carvedilol 25 mg oral tablet  -- 2 tab(s) by mouth every 12 hours  -- Indication: For HTN    amLODIPine 10 mg oral tablet  -- 1 tab(s) by mouth once a day  -- Indication: For HTN    famotidine 20 mg oral tablet  -- 1 tab(s) by mouth once a day  -- Indication: For GERD    docusate sodium 100 mg oral capsule  -- 1 cap(s) by mouth once a day  -- Indication: For COnstipation    polyethylene glycol 3350 oral powder for reconstitution  -- 17 gram(s) by mouth once a day, As needed, Constipation  -- Indication: For COnstipation    senna oral tablet  -- 2 tab(s) by mouth once a day (at bedtime)   -- Indication: For COnstipation    Renagel 800 mg oral tablet  -- 2 tab(s) by mouth 3 times a day  -- Indication: For phosphate binder    hydrALAZINE 25 mg oral tablet  -- 4 tab(s) by mouth every 8 hours  -- It is very important that you take or use this exactly as directed.  Do not skip doses or discontinue unless directed by your doctor.  Some non-prescription drugs may aggravate your condition.  Read all labels carefully.  If a warning appears, check with your doctor before taking.    -- Indication: For HTN I will START or STAY ON the medications listed below when I get home from the hospital:    aspirin 81 mg oral delayed release tablet  -- 1 tab(s) by mouth once a day  -- Indication: For ppx    Percocet 5/325 oral tablet  -- 1 tab(s) by mouth every 6 hours, As Needed MDD:4 tabs  -- Caution federal law prohibits the transfer of this drug to any person other  than the person for whom it was prescribed.  May cause drowsiness.  Alcohol may intensify this effect.  Use care when operating dangerous machinery.  This prescription cannot be refilled.  This product contains acetaminophen.  Do not use  with any other product containing acetaminophen to prevent possible liver damage.  Using more of this medication than prescribed may cause serious breathing problems.    -- Indication: For pain control    losartan 50 mg oral tablet  -- 2 tab(s) by mouth once a day  -- Do not take this drug if you are pregnant.  It is very important that you take or use this exactly as directed.  Do not skip doses or discontinue unless directed by your doctor.  Some non-prescription drugs may aggravate your condition.  Read all labels carefully.  If a warning appears, check with your doctor before taking.    -- Indication: For HTN    cloNIDine 0.1 mg oral tablet  -- 1 tab(s) by mouth 2 times a day  -- Indication: For HTN    isosorbide mononitrate 60 mg oral tablet, extended release  -- 1 tab(s) by mouth once a day  -- Indication: For HTN    gabapentin 300 mg oral capsule  -- 1 cap(s) by mouth once a day  -- It is very important that you take or use this exactly as directed.  Do not skip doses or discontinue unless directed by your doctor.  May cause drowsiness.  Alcohol may intensify this effect.  Use care when operating dangerous machinery.    -- Indication: For pain control    diphenhydrAMINE 50 mg oral capsule  -- 1 cap(s) by mouth once a day (at bedtime), As needed, Insomnia  -- Indication: For pruritis    atorvastatin 10 mg oral tablet  -- 1 tab(s) by mouth once a day (at bedtime)  -- Indication: For HLD    carvedilol 25 mg oral tablet  -- 2 tab(s) by mouth every 12 hours  -- Indication: For HTN    famotidine 20 mg oral tablet  -- 1 tab(s) by mouth once a day  -- Indication: For GERD    docusate sodium 100 mg oral capsule  -- 1 cap(s) by mouth once a day  -- Indication: For COnstipation    polyethylene glycol 3350 oral powder for reconstitution  -- 17 gram(s) by mouth once a day, As needed, Constipation  -- Indication: For COnstipation    senna oral tablet  -- 2 tab(s) by mouth once a day (at bedtime)   -- Indication: For COnstipation    Renagel 800 mg oral tablet  -- 2 tab(s) by mouth 3 times a day  -- Indication: For phosphate binder    hydrALAZINE 25 mg oral tablet  -- 4 tab(s) by mouth every 8 hours  -- It is very important that you take or use this exactly as directed.  Do not skip doses or discontinue unless directed by your doctor.  Some non-prescription drugs may aggravate your condition.  Read all labels carefully.  If a warning appears, check with your doctor before taking.    -- Indication: For HTN

## 2017-11-28 NOTE — DISCHARGE NOTE ADULT - PATIENT PORTAL LINK FT
“You can access the Follow[employer] Patient Portal, offered by [facility], by registering with the following website: [email or web address]”

## 2017-11-28 NOTE — DISCHARGE NOTE ADULT - CARE PROVIDER_API CALL
Anil Ingram), Surgery; Vascular Surgery  [address]  [address]  [place], [place] [address]  Phone: [phone]  Fax: [phone] Anil Ingram), Surgery; Vascular Surgery  [address]  [address] [place] [address]  Phone: [phone]  Fax: [phone]    Albino Veloz), Internal Medicine; Nephrology  [address] [address]  Phone: [phone]  Fax: [phone]

## 2017-11-28 NOTE — PROGRESS NOTE ADULT - PROBLEM SELECTOR PLAN 3
Anemia of chronic disease with hemologbin 8.3. Not iron deficient.  EPO during HD treatment.  Transfuse PRBC per primary team as indicated.

## 2017-11-28 NOTE — PROGRESS NOTE ADULT - SUBJECTIVE AND OBJECTIVE BOX
Patient is a 37y Male seen and evaluated at bedside. Patient lying in bed in no acute distress at present. Denies any chest pain, shortness of breath, palpitations, dizziness at present.     ALBUTerol    0.083% 2.5 every 6 hours PRN  aspirin  chewable 81 daily  atorvastatin 10 at bedtime  carvedilol 50 every 12 hours  cloNIDine 0.1 two times a day  dextrose 5%. 1000 <Continuous>  dextrose 50% Injectable 12.5 once  dextrose 50% Injectable 25 once  dextrose 50% Injectable 25 once  dextrose Gel 1 once PRN  diphenhydrAMINE   Capsule 25 every 6 hours PRN  docusate sodium 100 three times a day  doxazosin 2 at bedtime  famotidine    Tablet 20 daily  gabapentin 300 daily  glucagon  Injectable 1 once PRN  heparin  Injectable 5000 every 8 hours  HYDROmorphone  Injectable 1 every 4 hours PRN  HYDROmorphone  Injectable 0.5 every 4 hours PRN  insulin lispro (HumaLOG) corrective regimen sliding scale  Before meals and at bedtime  isosorbide   mononitrate ER Tablet (IMDUR) 60 daily  losartan 100 daily  sevelamer hydrochloride 2400 three times a day with meals      Allergies    morphine (Other)  nitroglycerin (Anaphylaxis)  shellfish (Anaphylaxis)    Intolerances        T(C): , Max: 37.6 (11-27-17 @ 22:25)  T(F): , Max: 99.6 (11-27-17 @ 22:25)  HR: 64 (11-28-17 @ 11:26)  BP: 126/71 (11-28-17 @ 11:26)  BP(mean): 102 (11-28-17 @ 11:26)  RR: 21 (11-28-17 @ 11:26)  SpO2: 98% (11-28-17 @ 11:26)  Wt(kg): --    11-27 @ 07:01  -  11-28 @ 07:00  --------------------------------------------------------  IN: 902.7 mL / OUT: 4000 mL / NET: -3097.3 mL    11-28 @ 07:01  -  11-28 @ 13:16  --------------------------------------------------------  IN: 250 mL / OUT: 50 mL / NET: 200 mL          Review of Systems:  CONSTITUTIONAL: No fever or chills  EYES: No blurred or double vision.  RESPIRATORY: No shortness of breath, cough, hemoptysis  CARDIOVASCULAR: No Chest pain or shortness of breath  GASTROINTESTINAL: NO abdominal or flank pain, No nausea or vomiting, No diarrhea  GENITOURINARY: No dysuria or urinary burning, No difficulty passing urine, No hematuria  NEUROLOGICAL: No headaches or blurred vision  SKIN: No skin rashes   MUSCULOSKELETAL: No arthralgia, Joint pain, leg edema, No muscle pains      PHYSICAL EXAM:  GENERAL: NAD, well-developed, well nourished, alert, awake, no acute distress at present  HEAD:  Atraumatic, Normocephalic,   EYES: Bilateral conjuctiva and sclera normal   Oral cavity: Oral mucosa dry and pale  NECK: Neck supple, No JVD  CHEST/LUNG: Clear to auscultation bilaterally; No wheeze, no rales, no crepitations  HEART: Regular rate and rhythm. CINDY II/VI at LPSB, No gallop, no rub   ABDOMEN: Soft, Nontender, BS+nt, No flank tenderness.   EXTREMITIES: No clubbing, cyanosis, or edema  Neurology: AAOx3, no focal neurological deficit  SKIN: No rashes or lesions          ACCESS: RUE AV fistula+nt, No bleeding     LABS:                         8.3    3.3   )-----------( 79       ( 28 Nov 2017 04:44 )             25.7     11-28    138  |  94<L>  |  38<H>  ----------------------------<  89  4.8   |  26  |  7.80<H>    Ca    9.2      28 Nov 2017 04:44  Phos  7.9     11-28  Mg     2.2     11-28    TPro  8.0  /  Alb  4.7  /  TBili  1.0  /  DBili  x   /  AST  15  /  ALT  9<L>  /  AlkPhos  48  11-27    Hemoglobin A1C, Whole Blood: 4.4 % [4.0 - 5.6] (11-28 @ 04:44)    PT/INR - ( 27 Nov 2017 08:10 )   PT: 12.7 sec;   INR: 1.14          PTT - ( 27 Nov 2017 08:10 )  PTT:48.6 sec          RADIOLOGY & ADDITIONAL STUDIES:

## 2017-11-28 NOTE — PROGRESS NOTE ADULT - ATTENDING COMMENTS
seen on HD, tolerating rx  BP improved, no SOB  cont rx as above   UF as tolerated   for AVF angio post
volume, HTN controlled , lytes ok  no need for HD today - will do tomorrow

## 2017-11-28 NOTE — DISCHARGE NOTE ADULT - HOSPITAL COURSE
HPI: 37yMale with ESRD on HD (M/W/F - last full dialysis 11/10/17 ), malignant HTN, Type A dissection repair (Dr. Moreno, 2015) c/b wound infection s/p sternal wound reconstruction with pectoralis flap, Type B dissection repair ([facility], 04/2016), LUE steal syndrome associated with the AV fistula s/p AVF ligation, and right brachiocephalic fistula creation with vein patch (6/22/17) presented to [facility] ED with complaints of cough, fever, and bleeding from AVF site after dialysis on two occasions. Pt was recently admitted with similar complaint s/p fistulogram and balloon angioplasty (11/15/17). Associated symptoms include has dry cough x4 days, chills, and fevers. He was admitted for evaluation of bleeding AVF, anemia, and hyperkalemia.    On hospital day  1,  pt became acutely diaphoretic and HTNsive with SBP in 200s. He was given hydralazine 20mg and labetalol 10mg. He was also given insulin, D50,  kayexalate, and calcium gluconate for hyperkalemia (K 6.0, no EKG changes except for baseline ST segment elevation). Pt then became hypoglycemic with FS in 30s, started on D10 gtt after D50 bolus. In addition pt c/o left sided abdominal and flank pain. He was transferred to SICU for hypertensive emergency with concern for aortic dissection. CT scan revealed old chronic dissections b/l LE but no new dissections. BP and glucose were controlled. Pt had dialysis via right AVF without difficulty. No bleeding from fistula after needles removed. AVF with good thrill. No need for fistulogram. HPI: 37yMale with ESRD on HD (M/W/F - last full dialysis 11/10/17 ), malignant HTN, Type A dissection repair (Dr. Moreno, 2015) c/b wound infection s/p sternal wound reconstruction with pectoralis flap, Type B dissection repair ([facility], 04/2016), LUE steal syndrome associated with the AV fistula s/p AVF ligation, and right brachiocephalic fistula creation with vein patch (6/22/17) presented to [facility] ED with complaints of cough, fever, and bleeding from AVF site after dialysis on two occasions. Pt was recently admitted with similar complaint s/p fistulogram and balloon angioplasty (11/15/17). Associated symptoms include has dry cough x4 days, chills, and fevers. He was admitted for evaluation of bleeding AVF, anemia, and hyperkalemia.    On hospital day  1,  pt became acutely diaphoretic and HTNsive with SBP in 200s. He was given hydralazine 20mg and labetalol 10mg. He was also given insulin, D50,  kayexalate, and calcium gluconate for hyperkalemia (K 6.0, no EKG changes except for baseline ST segment elevation). Pt then became hypoglycemic with FS in 30s, started on D10 gtt after D50 bolus. In addition pt c/o left sided abdominal and flank pain. He was transferred to SICU for hypertensive emergency with concern for aortic dissection. CT scan revealed old chronic dissections b/l LE but no new dissections. BP and glucose were controlled. Pt had dialysis via right AVF without difficulty. No bleeding from fistula after needles removed. AVF with good thrill. No need for fistulogram. BP medications adjusted for hypotension. Pt instructed to follow up with Dr Veloz for BP check and medication adjustments. Pt discharged in stable condition.

## 2017-11-28 NOTE — PROGRESS NOTE ADULT - PROBLEM SELECTOR PLAN 4
Calclium 9.2 , Phosphorus 7.2 at present.  Low phosphorus renal diet.  Obtain Vitamin D 25, vitamin D 1,25, Intact PTH

## 2017-11-28 NOTE — PROGRESS NOTE ADULT - PROBLEM SELECTOR PLAN 1
ESRD on HD ( MWF) through right AVF.  Last dialyzed on 11/27 with 4L UF. Tolerated procedure well.  Low K/Low phos/renal diet  Fluid restriction<1L/day.  Defer HD treatment today. Volume status and electrolytes stable.

## 2017-11-28 NOTE — PROGRESS NOTE ADULT - ASSESSMENT
37yoM with malignant HTN, ESRD on HD, Type A and B aortic dissections s/p repair c/b wound infection s/p pec flap, admitted with bleeding from AVF, hyperkalemia, and anemia, transferred to SICU for hypertensive emergency and concern for aortic dissection    - angio canceled for today; step down to 5uris later today; possible discharge later today    Neuro: Dilaudid, Ofirmev PRN. Neurontin  Resp: NC, goal SpO2>91, prn Duonebs.   CV: -150, PRN labetalol, atorvastatin, Coreg, clonidine, cardura, losartan  GI/FEN: NPO, colace, pepcid  : Voids. Sevelamer. HD MWF,  Heme: ASA, SQH  Endo: ISS  ID: N/A  Wounds/Lines: PIVs

## 2017-11-28 NOTE — DISCHARGE NOTE ADULT - CARE PROVIDERS DIRECT ADDRESSES
[email or web address]

## 2017-11-28 NOTE — DISCHARGE NOTE ADULT - CARE PLAN
Principal Discharge DX:	AVF (arteriovenous fistula)  Goal:	Recovery, return to normal activities, blood pressure control  Instructions for follow-up, activity and diet:	Upon discharge, please continue to take your home medications for blood pressure control. We have stopped your minoxidil and decreased the dose of your clonidine. Please be sure to follow up with Dr. Veloz within one week to discuss adjustments to your blood pressure medications. You may call the office at your earliest convenience to make an appointment.    Please also follow up with Dr. Ingram on Tuesday; you may call the office to make an appointment at your earliest convenience.    Continue dialysis as scheduled Principal Discharge DX:	AVF (arteriovenous fistula)  Goal:	Recovery, return to normal activities, blood pressure control  Instructions for follow-up, activity and diet:	Upon discharge, please continue to take your home medications for blood pressure control. We have stopped your minoxidil and decreased the dose of your clonidine. Please be sure to follow up with Dr. Veloz within one week to discuss adjustments to your blood pressure medications. You may call the office at your earliest convenience to make an appointment.    Please also follow up with Dr. Ingram on Tuesday; you may call the office to make an appointment at your earliest convenience.    Continue dialysis as scheduled and continue taking phosphate binders with each meal, 3 tabs.    Contact your doctor or go to the ER for fever > 101.5, chills, nausea, vomiting, chest pain, shortness of breath, pain not controlled by medication or excessive bleeding. Principal Discharge DX:	AVF (arteriovenous fistula)  Goal:	Recovery, return to normal activities, blood pressure control  Instructions for follow-up, activity and diet:	Upon discharge, please continue to take your home medications for blood pressure control. We have stopped your minoxidil, Cardura, and amlodipine; we have also decreased the dose of your clonidine to 0.1mg three times a day. Please be sure to follow up with Dr. Veloz within one week to discuss adjustments to your blood pressure medications. You may call the office at your earliest convenience to make an appointment.    Please also follow up with Dr. Ingram on Tuesday; you may call the office to make an appointment at your earliest convenience.    Continue dialysis as scheduled and continue taking phosphate binders with each meal, 3 tabs.    Contact your doctor or go to the ER for fever > 101.5, chills, nausea, vomiting, chest pain, shortness of breath, pain not controlled by medication or excessive bleeding. Principal Discharge DX:	AVF (arteriovenous fistula)  Goal:	Recovery, return to normal activities, blood pressure control  Instructions for follow-up, activity and diet:	Upon discharge, please continue to take your home medications for blood pressure control. We have stopped your minoxidil, Cardura, and amlodipine; we have also decreased the dose of your clonidine to 0.1mg three times a day. Please be sure to follow up with Dr. Veloz within one week to discuss adjustments to your blood pressure medications. You may call the office at your earliest convenience to make an appointment.    Please also follow up with Dr. Ingram on Tuesday 12/5/17; you may call the office to make an appointment at your earliest convenience.    Continue dialysis as scheduled and continue taking phosphate binders with each meal, 3 tabs.    Contact your doctor or go to the ER for fever > 101.5, chills, nausea, vomiting, chest pain, shortness of breath, pain not controlled by medication or excessive bleeding.

## 2017-11-28 NOTE — DISCHARGE NOTE ADULT - PLAN OF CARE
Recovery, return to normal activities, blood pressure control Upon discharge, please continue to take your home medications for blood pressure control. We have stopped your minoxidil and decreased the dose of your clonidine. Please be sure to follow up with Dr. Veloz within one week to discuss adjustments to your blood pressure medications. You may call the office at your earliest convenience to make an appointment.    Please also follow up with Dr. Ingram on Tuesday; you may call the office to make an appointment at your earliest convenience.    Continue dialysis as scheduled Upon discharge, please continue to take your home medications for blood pressure control. We have stopped your minoxidil and decreased the dose of your clonidine. Please be sure to follow up with Dr. Veloz within one week to discuss adjustments to your blood pressure medications. You may call the office at your earliest convenience to make an appointment.    Please also follow up with Dr. Ingram on Tuesday; you may call the office to make an appointment at your earliest convenience.    Continue dialysis as scheduled and continue taking phosphate binders with each meal, 3 tabs.    Contact your doctor or go to the ER for fever > 101.5, chills, nausea, vomiting, chest pain, shortness of breath, pain not controlled by medication or excessive bleeding. Upon discharge, please continue to take your home medications for blood pressure control. We have stopped your minoxidil, Cardura, and amlodipine; we have also decreased the dose of your clonidine to 0.1mg three times a day. Please be sure to follow up with Dr. Veloz within one week to discuss adjustments to your blood pressure medications. You may call the office at your earliest convenience to make an appointment.    Please also follow up with Dr. Ingram on Tuesday; you may call the office to make an appointment at your earliest convenience.    Continue dialysis as scheduled and continue taking phosphate binders with each meal, 3 tabs.    Contact your doctor or go to the ER for fever > 101.5, chills, nausea, vomiting, chest pain, shortness of breath, pain not controlled by medication or excessive bleeding. Upon discharge, please continue to take your home medications for blood pressure control. We have stopped your minoxidil, Cardura, and amlodipine; we have also decreased the dose of your clonidine to 0.1mg three times a day. Please be sure to follow up with Dr. Veloz within one week to discuss adjustments to your blood pressure medications. You may call the office at your earliest convenience to make an appointment.    Please also follow up with Dr. Ingram on Tuesday 12/5/17; you may call the office to make an appointment at your earliest convenience.    Continue dialysis as scheduled and continue taking phosphate binders with each meal, 3 tabs.    Contact your doctor or go to the ER for fever > 101.5, chills, nausea, vomiting, chest pain, shortness of breath, pain not controlled by medication or excessive bleeding.

## 2017-11-28 NOTE — PROGRESS NOTE ADULT - PROBLEM SELECTOR PLAN 2
BP controlled on po medications.  Continue po BP medications  Low salt diet  Adjust UF during HD treatment to avoid volume mediated hypertension.

## 2017-11-30 DIAGNOSIS — Z91.013 ALLERGY TO SEAFOOD: ICD-10-CM

## 2017-11-30 DIAGNOSIS — D63.1 ANEMIA IN CHRONIC KIDNEY DISEASE: ICD-10-CM

## 2017-11-30 DIAGNOSIS — E83.89 OTHER DISORDERS OF MINERAL METABOLISM: ICD-10-CM

## 2017-11-30 DIAGNOSIS — T82.838A HEMORRHAGE DUE TO VASCULAR PROSTHETIC DEVICES, IMPLANTS AND GRAFTS, INITIAL ENCOUNTER: ICD-10-CM

## 2017-11-30 DIAGNOSIS — Z79.82 LONG TERM (CURRENT) USE OF ASPIRIN: ICD-10-CM

## 2017-11-30 DIAGNOSIS — I16.1 HYPERTENSIVE EMERGENCY: ICD-10-CM

## 2017-11-30 DIAGNOSIS — E16.2 HYPOGLYCEMIA, UNSPECIFIED: ICD-10-CM

## 2017-11-30 DIAGNOSIS — K21.9 GASTRO-ESOPHAGEAL REFLUX DISEASE WITHOUT ESOPHAGITIS: ICD-10-CM

## 2017-11-30 DIAGNOSIS — Z99.2 DEPENDENCE ON RENAL DIALYSIS: ICD-10-CM
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
## 2017-11-30 DIAGNOSIS — I72.9 ANEURYSM OF UNSPECIFIED SITE: ICD-10-CM

## 2017-11-30 DIAGNOSIS — Z88.6 ALLERGY STATUS TO ANALGESIC AGENT: ICD-10-CM

## 2017-11-30 DIAGNOSIS — E78.5 HYPERLIPIDEMIA, UNSPECIFIED: ICD-10-CM

## 2017-11-30 DIAGNOSIS — N18.6 END STAGE RENAL DISEASE: ICD-10-CM

## 2017-11-30 DIAGNOSIS — I12.0 HYPERTENSIVE CHRONIC KIDNEY DISEASE WITH STAGE 5 CHRONIC KIDNEY DISEASE OR END STAGE RENAL DISEASE: ICD-10-CM

## 2017-11-30 DIAGNOSIS — K59.00 CONSTIPATION, UNSPECIFIED: ICD-10-CM

## 2017-11-30 DIAGNOSIS — I95.9 HYPOTENSION, UNSPECIFIED: ICD-10-CM

## 2017-11-30 DIAGNOSIS — E87.5 HYPERKALEMIA: ICD-10-CM

## 2017-11-30 LAB
AMPHETAMINES BLD QL SCN: NEGATIVE — SIGNIFICANT CHANGE UP
BARBITURATES SERPLBLD QL: NEGATIVE — SIGNIFICANT CHANGE UP
BENZODIAZAPINES, SERUM: NEGATIVE — SIGNIFICANT CHANGE UP
CANNABINOIDS SERPLBLD QL SCN: NEGATIVE — SIGNIFICANT CHANGE UP
COCAINE+BZE SERPLBLD QL SCN: NEGATIVE — SIGNIFICANT CHANGE UP
METHADONE SERPL-MCNC: NEGATIVE — SIGNIFICANT CHANGE UP
OPIATES SERPL QL: NEGATIVE — SIGNIFICANT CHANGE UP
PCP BLD QL SCN: NEGATIVE — SIGNIFICANT CHANGE UP

## 2017-12-04 ENCOUNTER — RX RENEWAL (OUTPATIENT)
Age: 37
End: 2017-12-04

## 2017-12-04 RX ORDER — ATORVASTATIN CALCIUM 10 MG/1
10 TABLET, FILM COATED ORAL DAILY
Qty: 30 | Refills: 5 | Status: ACTIVE | COMMUNITY
Start: 2017-01-31 | End: 1900-01-01

## 2017-12-05 ENCOUNTER — MEDICATION RENEWAL (OUTPATIENT)
Age: 37
End: 2017-12-05

## 2017-12-05 RX ORDER — CLONIDINE HYDROCHLORIDE 0.1 MG/1
0.1 TABLET ORAL 3 TIMES DAILY
Qty: 90 | Refills: 5 | Status: ACTIVE | COMMUNITY
Start: 2017-12-05 | End: 1900-01-01

## 2017-12-13 NOTE — PATIENT PROFILE ADULT. - NS TRANSFER RESPONSE BELONGING
yes Metal door/ knob, pt fell into/ it fell onto his left knee. Knee is swollen, unable to move leg, +pulse and can wiggle toes.

## 2017-12-19 PROCEDURE — 36430 TRANSFUSION BLD/BLD COMPNT: CPT

## 2017-12-19 PROCEDURE — 86850 RBC ANTIBODY SCREEN: CPT

## 2017-12-19 PROCEDURE — 84100 ASSAY OF PHOSPHORUS: CPT

## 2017-12-19 PROCEDURE — 85730 THROMBOPLASTIN TIME PARTIAL: CPT

## 2017-12-19 PROCEDURE — C1725: CPT

## 2017-12-19 PROCEDURE — 86923 COMPATIBILITY TEST ELECTRIC: CPT

## 2017-12-19 PROCEDURE — 86900 BLOOD TYPING SEROLOGIC ABO: CPT

## 2017-12-19 PROCEDURE — 83735 ASSAY OF MAGNESIUM: CPT

## 2017-12-19 PROCEDURE — C1769: CPT

## 2017-12-19 PROCEDURE — 86901 BLOOD TYPING SEROLOGIC RH(D): CPT

## 2017-12-19 PROCEDURE — C1894: CPT

## 2017-12-19 PROCEDURE — 85027 COMPLETE CBC AUTOMATED: CPT

## 2017-12-19 PROCEDURE — 85610 PROTHROMBIN TIME: CPT

## 2017-12-19 PROCEDURE — 80048 BASIC METABOLIC PNL TOTAL CA: CPT

## 2017-12-19 PROCEDURE — 80053 COMPREHEN METABOLIC PANEL: CPT

## 2017-12-19 PROCEDURE — 71046 X-RAY EXAM CHEST 2 VIEWS: CPT

## 2017-12-19 PROCEDURE — 90935 HEMODIALYSIS ONE EVALUATION: CPT

## 2017-12-19 PROCEDURE — 99285 EMERGENCY DEPT VISIT HI MDM: CPT | Mod: 25

## 2017-12-19 PROCEDURE — P9016: CPT

## 2017-12-19 PROCEDURE — 36415 COLL VENOUS BLD VENIPUNCTURE: CPT

## 2017-12-19 PROCEDURE — 93005 ELECTROCARDIOGRAM TRACING: CPT

## 2017-12-20 ENCOUNTER — INPATIENT (INPATIENT)
Facility: HOSPITAL | Age: 37
LOS: 1 days | Discharge: HOME | DRG: 252 | End: 2017-12-22
Attending: SURGERY | Admitting: SURGERY
Payer: MEDICARE

## 2017-12-20 ENCOUNTER — APPOINTMENT (OUTPATIENT)
Dept: VASCULAR SURGERY | Facility: CLINIC | Age: 37
End: 2017-12-20
Payer: MEDICARE

## 2017-12-20 VITALS
RESPIRATION RATE: 17 BRPM | DIASTOLIC BLOOD PRESSURE: 70 MMHG | SYSTOLIC BLOOD PRESSURE: 125 MMHG | TEMPERATURE: 99 F | OXYGEN SATURATION: 98 % | HEART RATE: 61 BPM
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
## 2017-12-20 DIAGNOSIS — N18.6 END STAGE RENAL DISEASE: ICD-10-CM

## 2017-12-20 DIAGNOSIS — Z99.2 END STAGE RENAL DISEASE: ICD-10-CM

## 2017-12-20 DIAGNOSIS — T82.599A OTHER MECHANICAL COMPLICATION OF UNSPECIFIED CARDIAC AND VASCULAR DEVICES AND IMPLANTS, INITIAL ENCOUNTER: ICD-10-CM

## 2017-12-20 DIAGNOSIS — Z98.89 OTHER SPECIFIED POSTPROCEDURAL STATES: Chronic | ICD-10-CM

## 2017-12-20 DIAGNOSIS — I77.0 ARTERIOVENOUS FISTULA, ACQUIRED: Chronic | ICD-10-CM

## 2017-12-20 LAB
ALBUMIN SERPL ELPH-MCNC: 4.3 G/DL — SIGNIFICANT CHANGE UP (ref 3.3–5)
ALP SERPL-CCNC: 45 U/L — SIGNIFICANT CHANGE UP (ref 40–120)
ALT FLD-CCNC: <5 U/L — LOW (ref 10–45)
ANION GAP SERPL CALC-SCNC: 21 MMOL/L — HIGH (ref 5–17)
APTT BLD: 47.6 SEC — HIGH (ref 27.5–37.4)
AST SERPL-CCNC: 12 U/L — SIGNIFICANT CHANGE UP (ref 10–40)
BILIRUB SERPL-MCNC: 0.7 MG/DL — SIGNIFICANT CHANGE UP (ref 0.2–1.2)
BUN SERPL-MCNC: 23 MG/DL — SIGNIFICANT CHANGE UP (ref 7–23)
BUN SERPL-MCNC: 65 MG/DL — HIGH (ref 7–23)
CALCIUM SERPL-MCNC: 9.1 MG/DL — SIGNIFICANT CHANGE UP (ref 8.4–10.5)
CHLORIDE SERPL-SCNC: 91 MMOL/L — LOW (ref 96–108)
CO2 SERPL-SCNC: 24 MMOL/L — SIGNIFICANT CHANGE UP (ref 22–31)
CREAT SERPL-MCNC: 11.54 MG/DL — HIGH (ref 0.5–1.3)
FERRITIN SERPL-MCNC: 976.1 NG/ML — HIGH (ref 30–400)
GLUCOSE SERPL-MCNC: 117 MG/DL — HIGH (ref 70–99)
HCT VFR BLD CALC: 25.8 % — LOW (ref 39–50)
HGB BLD-MCNC: 8.5 G/DL — LOW (ref 13–17)
INR BLD: 1.26 — HIGH (ref 0.88–1.16)
IRON SATN MFR SERPL: 32 % — SIGNIFICANT CHANGE UP (ref 16–55)
IRON SATN MFR SERPL: 63 UG/DL — SIGNIFICANT CHANGE UP (ref 45–165)
MAGNESIUM SERPL-MCNC: 2.2 MG/DL — SIGNIFICANT CHANGE UP (ref 1.6–2.6)
MCHC RBC-ENTMCNC: 31.3 PG — SIGNIFICANT CHANGE UP (ref 27–34)
MCHC RBC-ENTMCNC: 32.9 G/DL — SIGNIFICANT CHANGE UP (ref 32–36)
MCV RBC AUTO: 94.9 FL — SIGNIFICANT CHANGE UP (ref 80–100)
PHOSPHATE SERPL-MCNC: 8.2 MG/DL — HIGH (ref 2.5–4.5)
PLATELET # BLD AUTO: 104 K/UL — LOW (ref 150–400)
POTASSIUM SERPL-MCNC: 5.1 MMOL/L — SIGNIFICANT CHANGE UP (ref 3.5–5.3)
POTASSIUM SERPL-SCNC: 5.1 MMOL/L — SIGNIFICANT CHANGE UP (ref 3.5–5.3)
PROT SERPL-MCNC: 7.2 G/DL — SIGNIFICANT CHANGE UP (ref 6–8.3)
PROTHROM AB SERPL-ACNC: 14.1 SEC — HIGH (ref 9.8–12.7)
RBC # BLD: 2.72 M/UL — LOW (ref 4.2–5.8)
RBC # FLD: 14.4 % — SIGNIFICANT CHANGE UP (ref 10.3–16.9)
SODIUM SERPL-SCNC: 136 MMOL/L — SIGNIFICANT CHANGE UP (ref 135–145)
TIBC SERPL-MCNC: 200 UG/DL — LOW (ref 220–430)
UIBC SERPL-MCNC: 137 UG/DL — SIGNIFICANT CHANGE UP (ref 110–370)
WBC # BLD: 3.7 K/UL — LOW (ref 3.8–10.5)
WBC # FLD AUTO: 3.7 K/UL — LOW (ref 3.8–10.5)

## 2017-12-20 PROCEDURE — 93990 DOPPLER FLOW TESTING: CPT

## 2017-12-20 PROCEDURE — 99215 OFFICE O/P EST HI 40 MIN: CPT

## 2017-12-20 PROCEDURE — 76882 US LMTD JT/FCL EVL NVASC XTR: CPT | Mod: 26,RT

## 2017-12-20 PROCEDURE — 93010 ELECTROCARDIOGRAM REPORT: CPT

## 2017-12-20 PROCEDURE — 71010: CPT | Mod: 26

## 2017-12-20 PROCEDURE — 90935 HEMODIALYSIS ONE EVALUATION: CPT | Mod: GC

## 2017-12-20 RX ORDER — ATORVASTATIN CALCIUM 80 MG/1
10 TABLET, FILM COATED ORAL AT BEDTIME
Qty: 0 | Refills: 0 | Status: DISCONTINUED | OUTPATIENT
Start: 2017-12-20 | End: 2017-12-21

## 2017-12-20 RX ORDER — ASPIRIN/CALCIUM CARB/MAGNESIUM 324 MG
81 TABLET ORAL DAILY
Qty: 0 | Refills: 0 | Status: DISCONTINUED | OUTPATIENT
Start: 2017-12-20 | End: 2017-12-21

## 2017-12-20 RX ORDER — SEVELAMER CARBONATE 2400 MG/1
1600 POWDER, FOR SUSPENSION ORAL
Qty: 0 | Refills: 0 | Status: DISCONTINUED | OUTPATIENT
Start: 2017-12-20 | End: 2017-12-21

## 2017-12-20 RX ORDER — DIPHENHYDRAMINE HCL 50 MG
25 CAPSULE ORAL ONCE
Qty: 0 | Refills: 0 | Status: COMPLETED | OUTPATIENT
Start: 2017-12-20 | End: 2017-12-20

## 2017-12-20 RX ORDER — DIPHENHYDRAMINE HCL 50 MG
50 CAPSULE ORAL ONCE
Qty: 0 | Refills: 0 | Status: COMPLETED | OUTPATIENT
Start: 2017-12-20 | End: 2017-12-20

## 2017-12-20 RX ORDER — CALCITRIOL 0.5 UG/1
2 CAPSULE ORAL ONCE
Qty: 0 | Refills: 0 | Status: COMPLETED | OUTPATIENT
Start: 2017-12-20 | End: 2017-12-20

## 2017-12-20 RX ORDER — HEPARIN SODIUM 5000 [USP'U]/ML
5000 INJECTION INTRAVENOUS; SUBCUTANEOUS EVERY 8 HOURS
Qty: 0 | Refills: 0 | Status: DISCONTINUED | OUTPATIENT
Start: 2017-12-20 | End: 2017-12-21

## 2017-12-20 RX ORDER — OXYCODONE AND ACETAMINOPHEN 5; 325 MG/1; MG/1
2 TABLET ORAL EVERY 4 HOURS
Qty: 0 | Refills: 0 | Status: DISCONTINUED | OUTPATIENT
Start: 2017-12-20 | End: 2017-12-21

## 2017-12-20 RX ORDER — ISOSORBIDE MONONITRATE 60 MG/1
60 TABLET, EXTENDED RELEASE ORAL DAILY
Qty: 0 | Refills: 0 | Status: DISCONTINUED | OUTPATIENT
Start: 2017-12-20 | End: 2017-12-21

## 2017-12-20 RX ORDER — GABAPENTIN 400 MG/1
300 CAPSULE ORAL DAILY
Qty: 0 | Refills: 0 | Status: DISCONTINUED | OUTPATIENT
Start: 2017-12-20 | End: 2017-12-21

## 2017-12-20 RX ORDER — LOSARTAN POTASSIUM 100 MG/1
100 TABLET, FILM COATED ORAL DAILY
Qty: 0 | Refills: 0 | Status: DISCONTINUED | OUTPATIENT
Start: 2017-12-20 | End: 2017-12-21

## 2017-12-20 RX ORDER — DOCUSATE SODIUM 100 MG
100 CAPSULE ORAL THREE TIMES A DAY
Qty: 0 | Refills: 0 | Status: DISCONTINUED | OUTPATIENT
Start: 2017-12-20 | End: 2017-12-21

## 2017-12-20 RX ORDER — HYDRALAZINE HCL 50 MG
100 TABLET ORAL EVERY 8 HOURS
Qty: 0 | Refills: 0 | Status: DISCONTINUED | OUTPATIENT
Start: 2017-12-20 | End: 2017-12-21

## 2017-12-20 RX ORDER — CARVEDILOL PHOSPHATE 80 MG/1
25 CAPSULE, EXTENDED RELEASE ORAL EVERY 12 HOURS
Qty: 0 | Refills: 0 | Status: DISCONTINUED | OUTPATIENT
Start: 2017-12-20 | End: 2017-12-21

## 2017-12-20 RX ORDER — ERYTHROPOIETIN 10000 [IU]/ML
10000 INJECTION, SOLUTION INTRAVENOUS; SUBCUTANEOUS ONCE
Qty: 0 | Refills: 0 | Status: COMPLETED | OUTPATIENT
Start: 2017-12-20 | End: 2017-12-20

## 2017-12-20 RX ORDER — OXYCODONE AND ACETAMINOPHEN 5; 325 MG/1; MG/1
1 TABLET ORAL EVERY 6 HOURS
Qty: 0 | Refills: 0 | Status: DISCONTINUED | OUTPATIENT
Start: 2017-12-20 | End: 2017-12-21

## 2017-12-20 RX ORDER — FAMOTIDINE 10 MG/ML
20 INJECTION INTRAVENOUS DAILY
Qty: 0 | Refills: 0 | Status: DISCONTINUED | OUTPATIENT
Start: 2017-12-20 | End: 2017-12-21

## 2017-12-20 RX ORDER — ALBUMIN HUMAN 25 %
50 VIAL (ML) INTRAVENOUS
Qty: 0 | Refills: 0 | Status: DISCONTINUED | OUTPATIENT
Start: 2017-12-20 | End: 2017-12-21

## 2017-12-20 RX ADMIN — Medication 25 MILLIGRAM(S): at 22:07

## 2017-12-20 RX ADMIN — Medication 50 MILLIGRAM(S): at 15:25

## 2017-12-20 RX ADMIN — ATORVASTATIN CALCIUM 10 MILLIGRAM(S): 80 TABLET, FILM COATED ORAL at 21:16

## 2017-12-20 RX ADMIN — OXYCODONE AND ACETAMINOPHEN 2 TABLET(S): 5; 325 TABLET ORAL at 20:24

## 2017-12-20 RX ADMIN — Medication 25 MILLIGRAM(S): at 21:16

## 2017-12-20 RX ADMIN — OXYCODONE AND ACETAMINOPHEN 2 TABLET(S): 5; 325 TABLET ORAL at 21:24

## 2017-12-20 RX ADMIN — CALCITRIOL 2 MICROGRAM(S): 0.5 CAPSULE ORAL at 19:20

## 2017-12-20 RX ADMIN — GABAPENTIN 300 MILLIGRAM(S): 400 CAPSULE ORAL at 22:07

## 2017-12-20 RX ADMIN — ERYTHROPOIETIN 10000 UNIT(S): 10000 INJECTION, SOLUTION INTRAVENOUS; SUBCUTANEOUS at 19:20

## 2017-12-20 RX ADMIN — Medication 100 MILLIGRAM(S): at 21:16

## 2017-12-20 NOTE — H&P ADULT - NSHPPHYSICALEXAM_GEN_ALL_CORE
Gen: AAOx3, NAD  Pulm: CTA BL  CV: RRR S1 S2  RUE:+ Brachiocephalic AVF thrill with a pseudoaneurysmal appearance, no edema/erythema, 2+radial pulse. Strength 5/5 equal bilat, decreased sensation in right thumb.  LE: no edema, calfs soft and non tender

## 2017-12-20 NOTE — H&P ADULT - NSHPLABSRESULTS_GEN_ALL_CORE
36 yo M with RUE AVF malfunction, STEAL syndrome and pseudoaneurysm  -US of the RUE  -Pain control  -Home meds  -Renal diet/NPO p mn  -Renal consult for HD MWF (Dr. Veloz)  -Pre-op labs, EKG, CXR  -OR thursday 12/21 for fistulogram 38 yo M with ESRD on HD (MWF) with RUE AVF malfunction, STEAL syndrome and pseudoaneurysm  -US of the RUE  -Pain control  -Home meds  -Renal diet/NPO p mn  -Renal consult for HD MWF (Dr. Veloz)  -DVT ppx with HSQ  -Pre-op labs, EKG, CXR  -OR thursday 12/21 for fistulogram

## 2017-12-20 NOTE — CONSULT NOTE ADULT - PROBLEM SELECTOR RECOMMENDATION 3
Near normotension in the setting of prescribed antihypertensives  Continue with  hydrALAZINE 100 milliGRAM(s) Oral every 8 hours  isosorbide   mononitrate ER Tablet (IMDUR) 60 milliGRAM(s) Oral daily  losartan 100 milliGRAM(s) Oral daily  carvedilol 25 milliGRAM(s) Oral every 12 hours  cloNIDine 0.1 milliGRAM(s) Oral two times a day

## 2017-12-20 NOTE — PROGRESS NOTE ADULT - SUBJECTIVE AND OBJECTIVE BOX
Patient was seen and evaluated on dialysis.   Patient is tolerating the procedure well.   HR: 61 (12-20-17 @ 12:42)  BP: 125/70 (12-20-17 @ 12:42)  Continue dialysis:   Dialyzer:  Optiflux 200        QB: 400       QD: 500 2K bath     Last post HD weight outpatient 82.6kg  Pre HD weight now: 87.0kg  Patient requests 4kg UF.   Albumin prn ordered to prevent intradialytic hypotension     Goal UF 4kg over 4 Hours     IV Benadryl 50mg x 1 on HD for pruritus

## 2017-12-20 NOTE — CONSULT NOTE ADULT - ASSESSMENT
37M PMhx of ESRD on HD M/W/F since 11/2014 via RIGHT AVF (started use approximately early October 2017, previously had a RIJ permacath and prior left brachiocephalic AVF), HTN, anemia of CKD, CKD-MBD, HLD, thoracoabdominal aortic aneurysm repair, s/p Type A dissection repair with Dr. Moreno in 2015, pericardial window, sternal wound reconstruction with pec flap, and type B dissection repair at [facility] in 04/2016, LUE steal syndrome associated with the AV fistula, s/p Left AVF ligation and s/p Right brachiocephalic fistula with vein patch, recent RUE AVF fistuloplasty on 11/14/2017 for prolonged bleeding time who presents today to vascular service for scheduled RUE AVF angiogram on 12/21/2017 in light of having distal numbness and paresthesias along with having a two week history of prolonged bleeding and post HD blood clots being aspirated from the HD needles.

## 2017-12-20 NOTE — PROGRESS NOTE ADULT - SUBJECTIVE AND OBJECTIVE BOX
Pre-op Diagnosis: RUE AVF malfunction and STEAL syndrome  Procedure: RUE fistulogram/revision  Surgeon: Dr. Ingram    Consent: pending                          8.5    3.7   )-----------( 104      ( 20 Dec 2017 15:55 )             25.8           PT/INR - ( 20 Dec 2017 15:54 )   PT: 14.1 sec;   INR: 1.26          PTT - ( 20 Dec 2017 15:54 )  PTT:47.6 sec      Type & Screen: A+ Ab neg  CXR: No pleural effusion, cardiomegaly  EKG: Line Normal sinus rhythm, left axis deviation, left ventricular hypertrophy with repolarization abnormality, cannot rule out septal infarct , age undetermined        A/P: 37yMale pre-op for above procedure  1. NPO past midnight, except medications  2. 2am labs

## 2017-12-20 NOTE — CONSULT NOTE ADULT - PROBLEM SELECTOR RECOMMENDATION 9
M/W/F  Proceed with hemodialysis today.  Optiflux 180 / 2K bath /   / Duration 4 hours / UF to attain EDW of 82.6kg (outpatient EDW)     The QB may need to be reduced intradialysis in light of suspected venous outflow obstruction again.

## 2017-12-20 NOTE — CONSULT NOTE ADULT - NEGATIVE CARDIOVASCULAR SYMPTOMS
no paroxysmal nocturnal dyspnea/no orthopnea/no peripheral edema/no dyspnea on exertion/no chest pain/no palpitations
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
## 2017-12-20 NOTE — CONSULT NOTE ADULT - PROBLEM SELECTOR RECOMMENDATION 5
Renal Diet  Renvela 1600mg POTID with meals (okay to substitute Renagel while inpatient)  Calcijex on HD

## 2017-12-20 NOTE — H&P ADULT - HISTORY OF PRESENT ILLNESS
Pt is a 36 y/o M with ESRD on HD (M/W/F - last full dialysis 12/18/17 ), presents with right thumb numbness and tingling in the hand as well as issues with HD x 2 weeks. Other PMH malignant HTN, s/p Type A dissection repair with Dr. Moreno in 2015, pericardial window, sternal wound reconstruction with pec flap, and type B dissection repair at [facility] in 04/2016, LUE steal syndrome associated with the AV fistula, s/p Left AVF ligation and s/p Right brachiocephalic fistula with vein patch 6/22/17. Patient with previous admissions for similar symptoms s/p RE fistuloplasty on 11/14/17. Since then over the last 2 weeks he's been noticing clots during dialysis causing it to be stopped 1-1 1/2 hours early as well as prolonged bleeding. He also noted tingling in right hand and the thumb going numb in the last few days. Denies fever/chills, weakness/coldness.

## 2017-12-21 DIAGNOSIS — G56.10 OTHER LESIONS OF MEDIAN NERVE, UNSPECIFIED UPPER LIMB: ICD-10-CM

## 2017-12-21 LAB
ANION GAP SERPL CALC-SCNC: 17 MMOL/L — SIGNIFICANT CHANGE UP (ref 5–17)
BUN SERPL-MCNC: 38 MG/DL — HIGH (ref 7–23)
CALCIUM SERPL-MCNC: 9.4 MG/DL — SIGNIFICANT CHANGE UP (ref 8.4–10.5)
CHLORIDE SERPL-SCNC: 89 MMOL/L — LOW (ref 96–108)
CO2 SERPL-SCNC: 26 MMOL/L — SIGNIFICANT CHANGE UP (ref 22–31)
CREAT SERPL-MCNC: 6.92 MG/DL — HIGH (ref 0.5–1.3)
GLUCOSE SERPL-MCNC: 91 MG/DL — SIGNIFICANT CHANGE UP (ref 70–99)
HCT VFR BLD CALC: 30.1 % — LOW (ref 39–50)
HGB BLD-MCNC: 9.8 G/DL — LOW (ref 13–17)
MAGNESIUM SERPL-MCNC: 2.2 MG/DL — SIGNIFICANT CHANGE UP (ref 1.6–2.6)
MCHC RBC-ENTMCNC: 30.3 PG — SIGNIFICANT CHANGE UP (ref 27–34)
MCHC RBC-ENTMCNC: 32.6 G/DL — SIGNIFICANT CHANGE UP (ref 32–36)
MCV RBC AUTO: 93.2 FL — SIGNIFICANT CHANGE UP (ref 80–100)
PLATELET # BLD AUTO: 122 K/UL — LOW (ref 150–400)
POTASSIUM SERPL-MCNC: 4.4 MMOL/L — SIGNIFICANT CHANGE UP (ref 3.5–5.3)
POTASSIUM SERPL-SCNC: 4.4 MMOL/L — SIGNIFICANT CHANGE UP (ref 3.5–5.3)
RBC # BLD: 3.23 M/UL — LOW (ref 4.2–5.8)
RBC # FLD: 15.9 % — SIGNIFICANT CHANGE UP (ref 10.3–16.9)
SODIUM SERPL-SCNC: 132 MMOL/L — LOW (ref 135–145)
WBC # BLD: 4.4 K/UL — SIGNIFICANT CHANGE UP (ref 3.8–10.5)
WBC # FLD AUTO: 4.4 K/UL — SIGNIFICANT CHANGE UP (ref 3.8–10.5)

## 2017-12-21 PROCEDURE — 99231 SBSQ HOSP IP/OBS SF/LOW 25: CPT | Mod: GC

## 2017-12-21 PROCEDURE — 36832 AV FISTULA REVISION OPEN: CPT | Mod: GC

## 2017-12-21 RX ORDER — SODIUM CHLORIDE 9 MG/ML
250 INJECTION INTRAMUSCULAR; INTRAVENOUS; SUBCUTANEOUS ONCE
Qty: 0 | Refills: 0 | Status: COMPLETED | OUTPATIENT
Start: 2017-12-21 | End: 2017-12-21

## 2017-12-21 RX ORDER — OXYCODONE AND ACETAMINOPHEN 5; 325 MG/1; MG/1
1 TABLET ORAL EVERY 4 HOURS
Qty: 0 | Refills: 0 | Status: DISCONTINUED | OUTPATIENT
Start: 2017-12-21 | End: 2017-12-22

## 2017-12-21 RX ORDER — ASPIRIN/CALCIUM CARB/MAGNESIUM 324 MG
81 TABLET ORAL DAILY
Qty: 0 | Refills: 0 | Status: DISCONTINUED | OUTPATIENT
Start: 2017-12-21 | End: 2017-12-22

## 2017-12-21 RX ORDER — FAMOTIDINE 10 MG/ML
20 INJECTION INTRAVENOUS DAILY
Qty: 0 | Refills: 0 | Status: DISCONTINUED | OUTPATIENT
Start: 2017-12-21 | End: 2017-12-22

## 2017-12-21 RX ORDER — ISOSORBIDE MONONITRATE 60 MG/1
60 TABLET, EXTENDED RELEASE ORAL DAILY
Qty: 0 | Refills: 0 | Status: DISCONTINUED | OUTPATIENT
Start: 2017-12-21 | End: 2017-12-22

## 2017-12-21 RX ORDER — HYDROMORPHONE HYDROCHLORIDE 2 MG/ML
0.5 INJECTION INTRAMUSCULAR; INTRAVENOUS; SUBCUTANEOUS ONCE
Qty: 0 | Refills: 0 | Status: DISCONTINUED | OUTPATIENT
Start: 2017-12-21 | End: 2017-12-21

## 2017-12-21 RX ORDER — HYDRALAZINE HCL 50 MG
100 TABLET ORAL EVERY 8 HOURS
Qty: 0 | Refills: 0 | Status: DISCONTINUED | OUTPATIENT
Start: 2017-12-21 | End: 2017-12-22

## 2017-12-21 RX ORDER — CARVEDILOL PHOSPHATE 80 MG/1
25 CAPSULE, EXTENDED RELEASE ORAL EVERY 12 HOURS
Qty: 0 | Refills: 0 | Status: DISCONTINUED | OUTPATIENT
Start: 2017-12-21 | End: 2017-12-22

## 2017-12-21 RX ORDER — ACETAMINOPHEN 500 MG
650 TABLET ORAL EVERY 6 HOURS
Qty: 0 | Refills: 0 | Status: DISCONTINUED | OUTPATIENT
Start: 2017-12-21 | End: 2017-12-22

## 2017-12-21 RX ORDER — LOSARTAN POTASSIUM 100 MG/1
100 TABLET, FILM COATED ORAL DAILY
Qty: 0 | Refills: 0 | Status: DISCONTINUED | OUTPATIENT
Start: 2017-12-21 | End: 2017-12-22

## 2017-12-21 RX ORDER — OXYCODONE AND ACETAMINOPHEN 5; 325 MG/1; MG/1
2 TABLET ORAL EVERY 4 HOURS
Qty: 0 | Refills: 0 | Status: DISCONTINUED | OUTPATIENT
Start: 2017-12-21 | End: 2017-12-21

## 2017-12-21 RX ORDER — OXYCODONE AND ACETAMINOPHEN 5; 325 MG/1; MG/1
1 TABLET ORAL EVERY 6 HOURS
Qty: 0 | Refills: 0 | Status: DISCONTINUED | OUTPATIENT
Start: 2017-12-21 | End: 2017-12-21

## 2017-12-21 RX ORDER — GABAPENTIN 400 MG/1
300 CAPSULE ORAL DAILY
Qty: 0 | Refills: 0 | Status: DISCONTINUED | OUTPATIENT
Start: 2017-12-21 | End: 2017-12-22

## 2017-12-21 RX ORDER — ATORVASTATIN CALCIUM 80 MG/1
10 TABLET, FILM COATED ORAL AT BEDTIME
Qty: 0 | Refills: 0 | Status: DISCONTINUED | OUTPATIENT
Start: 2017-12-21 | End: 2017-12-22

## 2017-12-21 RX ORDER — HEPARIN SODIUM 5000 [USP'U]/ML
5000 INJECTION INTRAVENOUS; SUBCUTANEOUS EVERY 8 HOURS
Qty: 0 | Refills: 0 | Status: DISCONTINUED | OUTPATIENT
Start: 2017-12-21 | End: 2017-12-22

## 2017-12-21 RX ORDER — DOCUSATE SODIUM 100 MG
100 CAPSULE ORAL THREE TIMES A DAY
Qty: 0 | Refills: 0 | Status: DISCONTINUED | OUTPATIENT
Start: 2017-12-21 | End: 2017-12-22

## 2017-12-21 RX ORDER — SEVELAMER CARBONATE 2400 MG/1
1600 POWDER, FOR SUSPENSION ORAL
Qty: 0 | Refills: 0 | Status: DISCONTINUED | OUTPATIENT
Start: 2017-12-21 | End: 2017-12-22

## 2017-12-21 RX ADMIN — ISOSORBIDE MONONITRATE 60 MILLIGRAM(S): 60 TABLET, EXTENDED RELEASE ORAL at 17:47

## 2017-12-21 RX ADMIN — Medication 0.1 MILLIGRAM(S): at 17:47

## 2017-12-21 RX ADMIN — Medication 100 MILLIGRAM(S): at 05:52

## 2017-12-21 RX ADMIN — OXYCODONE AND ACETAMINOPHEN 2 TABLET(S): 5; 325 TABLET ORAL at 01:28

## 2017-12-21 RX ADMIN — GABAPENTIN 300 MILLIGRAM(S): 400 CAPSULE ORAL at 14:32

## 2017-12-21 RX ADMIN — HEPARIN SODIUM 5000 UNIT(S): 5000 INJECTION INTRAVENOUS; SUBCUTANEOUS at 14:32

## 2017-12-21 RX ADMIN — SEVELAMER CARBONATE 1600 MILLIGRAM(S): 2400 POWDER, FOR SUSPENSION ORAL at 17:48

## 2017-12-21 RX ADMIN — FAMOTIDINE 20 MILLIGRAM(S): 10 INJECTION INTRAVENOUS at 14:32

## 2017-12-21 RX ADMIN — OXYCODONE AND ACETAMINOPHEN 1 TABLET(S): 5; 325 TABLET ORAL at 16:36

## 2017-12-21 RX ADMIN — OXYCODONE AND ACETAMINOPHEN 1 TABLET(S): 5; 325 TABLET ORAL at 17:30

## 2017-12-21 RX ADMIN — CARVEDILOL PHOSPHATE 25 MILLIGRAM(S): 80 CAPSULE, EXTENDED RELEASE ORAL at 17:47

## 2017-12-21 RX ADMIN — Medication 100 MILLIGRAM(S): at 14:32

## 2017-12-21 RX ADMIN — CARVEDILOL PHOSPHATE 25 MILLIGRAM(S): 80 CAPSULE, EXTENDED RELEASE ORAL at 05:52

## 2017-12-21 RX ADMIN — OXYCODONE AND ACETAMINOPHEN 2 TABLET(S): 5; 325 TABLET ORAL at 00:28

## 2017-12-21 RX ADMIN — SODIUM CHLORIDE 500 MILLILITER(S): 9 INJECTION INTRAMUSCULAR; INTRAVENOUS; SUBCUTANEOUS at 20:17

## 2017-12-21 RX ADMIN — ATORVASTATIN CALCIUM 10 MILLIGRAM(S): 80 TABLET, FILM COATED ORAL at 21:11

## 2017-12-21 RX ADMIN — SODIUM CHLORIDE 500 MILLILITER(S): 9 INJECTION INTRAMUSCULAR; INTRAVENOUS; SUBCUTANEOUS at 22:24

## 2017-12-21 RX ADMIN — LOSARTAN POTASSIUM 100 MILLIGRAM(S): 100 TABLET, FILM COATED ORAL at 17:49

## 2017-12-21 RX ADMIN — HYDROMORPHONE HYDROCHLORIDE 0.5 MILLIGRAM(S): 2 INJECTION INTRAMUSCULAR; INTRAVENOUS; SUBCUTANEOUS at 14:02

## 2017-12-21 RX ADMIN — Medication 81 MILLIGRAM(S): at 14:32

## 2017-12-21 RX ADMIN — Medication 0.1 MILLIGRAM(S): at 05:52

## 2017-12-21 NOTE — PROGRESS NOTE ADULT - SUBJECTIVE AND OBJECTIVE BOX
Patient seen and examined at bedside.     Tolerated HD well yesterday   Weight 87.5kg to 83.4kg standing  He had reached a new outpatient EDW Of 82.5kg on 12/18.   He still has numbness in the right hand but no intradialytic cramping or pain yesterday  No dyspnea, leg swelling  Persistent pruritus.     Ongoing evaluation by neurology for median neuritis.     albumin human 25% IVPB 50 milliLiter(s) every 1 hour PRN  aspirin enteric coated 81 milliGRAM(s) daily  atorvastatin 10 milliGRAM(s) at bedtime  carvedilol 25 milliGRAM(s) every 12 hours  cloNIDine 0.1 milliGRAM(s) two times a day  docusate sodium 100 milliGRAM(s) three times a day  famotidine    Tablet 20 milliGRAM(s) daily  gabapentin 300 milliGRAM(s) daily  heparin  Injectable 5000 Unit(s) every 8 hours  hydrALAZINE 100 milliGRAM(s) every 8 hours  isosorbide   mononitrate ER Tablet (IMDUR) 60 milliGRAM(s) daily  losartan 100 milliGRAM(s) daily  oxyCODONE    5 mG/acetaminophen 325 mG 1 Tablet(s) every 6 hours PRN  oxyCODONE    5 mG/acetaminophen 325 mG 2 Tablet(s) every 4 hours PRN  sevelamer hydrochloride 1600 milliGRAM(s) three times a day with meals      Allergies    morphine (Other)  nitroglycerin (Anaphylaxis)  shellfish (Anaphylaxis)    Intolerances        T(C): , Max: 37.2 (12-20-17 @ 20:17)  T(F): , Max: 99 (12-20-17 @ 20:17)  HR: 70 (12-21-17 @ 08:30)  BP: 100/61 (12-21-17 @ 08:30)  BP(mean): --  RR: 17 (12-21-17 @ 08:30)  SpO2: 99% (12-21-17 @ 08:30)  Wt(kg): --    12-20 @ 07:01  -  12-21 @ 07:00  --------------------------------------------------------  IN:    Oral Fluid: 360 mL  Total IN: 360 mL    OUT:    Other: 4100 mL    Voided: 950 mL  Total OUT: 5050 mL    Total NET: -4690 mL        Height (cm): 180.28 (12-20 @ 23:18)  Weight (kg): 85.7 (12-20 @ 23:18)  BMI (kg/m2): 26.4 (12-20 @ 23:18)  BSA (m2): 2.06 (12-20 @ 23:18)      LABS:                        9.8    4.4   )-----------( 122      ( 21 Dec 2017 02:34 )             30.1     12-21    132<L>  |  89<L>  |  38<H>  ----------------------------<  91  4.4   |  26  |  6.92<H>    Ca    9.4      21 Dec 2017 02:34  Phos  8.2     12-20  Mg     2.2     12-21    TPro  7.2  /  Alb  4.3  /  TBili  0.7  /  DBili  x   /  AST  12  /  ALT  <5<L>  /  AlkPhos  45  12-20      PT/INR - ( 20 Dec 2017 15:54 )   PT: 14.1 sec;   INR: 1.26          PTT - ( 20 Dec 2017 15:54 )  PTT:47.6 sec          RADIOLOGY & ADDITIONAL STUDIES:

## 2017-12-21 NOTE — BRIEF OPERATIVE NOTE - OPERATION/FINDINGS
RUE brachiocephalic AVF with severe stricture in cephalic vein in mid-upper arm. Excision of strictured segment with patch venoplasty using bovine pericardium. At end of case, improved thrill in fistula. Hemostasis achieved.

## 2017-12-21 NOTE — PROGRESS NOTE ADULT - SUBJECTIVE AND OBJECTIVE BOX
Vascular Surgery Post-Op Note    Procedure: RUE AVF revision    Diagnosis/Indication: RUE AVF stenosis/malfunction    Surgeon: Dr. Ingram    S: Pt has no complaints. Denies CP, SOB, PEARSON, calf tenderness. Pain controlled with medication.    O:  T(C): 37 (12-21-17 @ 12:51), Max: 37 (12-21-17 @ 12:51)  T(F): 98.6 (12-21-17 @ 12:51), Max: 98.6 (12-21-17 @ 12:51)  HR: 70 (12-21-17 @ 14:00) (58 - 70)  BP: 108/69 (12-21-17 @ 14:00) (86/55 - 119/74)  RR: 16 (12-21-17 @ 13:36) (14 - 16)  SpO2: 99% (12-21-17 @ 14:00) (94% - 99%)  Wt(kg): --                        9.8    4.4   )-----------( 122      ( 21 Dec 2017 02:34 )             30.1     12-21    132<L>  |  89<L>  |  38<H>  ----------------------------<  91  4.4   |  26  |  6.92<H>    Ca    9.4      21 Dec 2017 02:34  Phos  8.2     12-20  Mg     2.2     12-21    TPro  7.2  /  Alb  4.3  /  TBili  0.7  /  DBili  x   /  AST  12  /  ALT  <5<L>  /  AlkPhos  45  12-20      Gen: NAD, resting comfortably in bed  C/V: NSR  Pulm: Nonlabored breathing, no respiratory distress  Abd: soft, NT/ND  Extrem: WWP, no calf edema, SCDs in place      A/P: 37yMale s/p above procedure  Diet: Renal  HD M-W-F, may use distal part of AVF tomorrow in HD  Pain/nausea control  Home meds  AM labs Vascular Surgery Post-Op Note    Procedure: RUE AVF revision    Diagnosis/Indication: RUE AVF stenosis/malfunction    Surgeon: Dr. Ingram    S: Pt has no complaints. Denies CP, SOB, PEARSON, calf tenderness. Pain controlled with medication.    O:  T(C): 37 (12-21-17 @ 12:51), Max: 37 (12-21-17 @ 12:51)  T(F): 98.6 (12-21-17 @ 12:51), Max: 98.6 (12-21-17 @ 12:51)  HR: 70 (12-21-17 @ 14:00) (58 - 70)  BP: 108/69 (12-21-17 @ 14:00) (86/55 - 119/74)  RR: 16 (12-21-17 @ 13:36) (14 - 16)  SpO2: 99% (12-21-17 @ 14:00) (94% - 99%)  Wt(kg): --                        9.8    4.4   )-----------( 122      ( 21 Dec 2017 02:34 )             30.1     12-21    132<L>  |  89<L>  |  38<H>  ----------------------------<  91  4.4   |  26  |  6.92<H>    Ca    9.4      21 Dec 2017 02:34  Phos  8.2     12-20  Mg     2.2     12-21    TPro  7.2  /  Alb  4.3  /  TBili  0.7  /  DBili  x   /  AST  12  /  ALT  <5<L>  /  AlkPhos  45  12-20      Gen: NAD, resting comfortably in bed, states the right hand base of the thumb's tingling and numbness is better  C/V: NSR  Pulm: Nonlabored breathing, no respiratory distress  Extrm: RUE AVF revision dressing intact with mild serous drainage, +thrill, 2+radial pulse. Sensory intact in right hand      A/P: 37yMale s/p above procedure  Diet: Renal  HD M-W-F, may use distal part of AVF tomorrow in HD  Pain/nausea control  Home meds  AM labs

## 2017-12-21 NOTE — PROGRESS NOTE ADULT - PROBLEM SELECTOR PLAN 3
Continue with  hydrALAZINE 100 milliGRAM(s) Oral every 8 hours  isosorbide   mononitrate ER Tablet (IMDUR) 60 milliGRAM(s) Oral daily  losartan 100 milliGRAM(s) Oral daily  carvedilol 25 milliGRAM(s) Oral every 12 hours  cloNIDine 0.1 milliGRAM(s) Oral two times a day.     Outpatient plan is slow taper off of clonidine per Dr. Veloz during DW challenge

## 2017-12-21 NOTE — PROGRESS NOTE ADULT - SUBJECTIVE AND OBJECTIVE BOX
Neurology Follow up note    Name  KELLY GROVE    HPI:  Pt is a 36 y/o M with ESRD on HD (M/W/F - last full dialysis 12/18/17 ), presents with right thumb numbness and tingling in the hand as well as issues with HD x 2 weeks. Other PMH malignant HTN, s/p Type A dissection repair with Dr. Moreno in 2015, pericardial window, sternal wound reconstruction with pec flap, and type B dissection repair at [facility] in 04/2016, LUE steal syndrome associated with the AV fistula, s/p Left AVF ligation and s/p Right brachiocephalic fistula with vein patch 6/22/17. Patient with previous admissions for similar symptoms s/p RE fistuloplasty on 11/14/17. Since then over the last 2 weeks he's been noticing clots during dialysis causing it to be stopped 1-1 1/2 hours early as well as prolonged bleeding. He also noted tingling in right hand and the thumb going numb in the last few days. Denies fever/chills, weakness/coldness. (20 Dec 2017 13:09)      Interval History - weakness in the RUE with weakness in the RUE        REVIEW OF SYSTEMS    Vital Signs Last 24 Hrs  T(C): 36.2 (21 Dec 2017 05:00), Max: 37.2 (20 Dec 2017 20:17)  T(F): 97.2 (21 Dec 2017 05:00), Max: 99 (20 Dec 2017 20:17)  HR: 60 (21 Dec 2017 05:00) (53 - 63)  BP: 161/83 (21 Dec 2017 05:00) (111/69 - 177/84)  BP(mean): --  RR: 17 (21 Dec 2017 05:00) (17 - 18)  SpO2: 98% (21 Dec 2017 05:00) (98% - 100%)    Physical Exam-     Mental Status- awake     Cranial Nerves- full EOM    Gait and station-n/a    Motor- RUE weakness median nerve    Reflexes- decreased    Sensation-no sensory level    Coordination-no tremors    Vascular -    Medications  albumin human 25% IVPB 50 milliLiter(s) IV Intermittent every 1 hour PRN  aspirin enteric coated 81 milliGRAM(s) Oral daily  atorvastatin 10 milliGRAM(s) Oral at bedtime  carvedilol 25 milliGRAM(s) Oral every 12 hours  cloNIDine 0.1 milliGRAM(s) Oral two times a day  docusate sodium 100 milliGRAM(s) Oral three times a day  famotidine    Tablet 20 milliGRAM(s) Oral daily  gabapentin 300 milliGRAM(s) Oral daily  heparin  Injectable 5000 Unit(s) SubCutaneous every 8 hours  hydrALAZINE 100 milliGRAM(s) Oral every 8 hours  isosorbide   mononitrate ER Tablet (IMDUR) 60 milliGRAM(s) Oral daily  losartan 100 milliGRAM(s) Oral daily  oxyCODONE    5 mG/acetaminophen 325 mG 1 Tablet(s) Oral every 6 hours PRN  oxyCODONE    5 mG/acetaminophen 325 mG 2 Tablet(s) Oral every 4 hours PRN  sevelamer hydrochloride 1600 milliGRAM(s) Oral three times a day with meals      Lab      Radiology    Assessment- Median neuropathy    Plan- after proceed ure today - I will follow as OP

## 2017-12-21 NOTE — PROGRESS NOTE ADULT - SUBJECTIVE AND OBJECTIVE BOX
o/n: 2am k- 4.4 hgb- 9.8  12/20: 1u prbc given during HD; preopped for OR tomorrow      36 yo M with ESRD on HD (MWF) presents with RUE AVF malfunction, and STEAL syndrome and pseudoaneurysm  -US of the RUE  -Pain control  -asa  -Home meds  -Renal diet/NPO p mn  -Renal consult for HD MWF (Dr. Veloz)  -DVT ppx with HSQ  -OR thursday 12/21 for fistulogram o/n: 2am k- 4.4 hgb- 9.8  12/20: 1u prbc given during HD; preopped for OR tomorrow    Subjective: Pt seen and examined at bedside. Consent obtained. No acute complaints.   Pain (0-10):            Pain Control Adequate: [x ] YES [ ] NO        Location: ___  Nausea: [ ] YES [x ] NO            Vomiting: [ ] YES [x ] NO  Diarrhea: [ ] YES [x ] NO         Constipation: [ ] YES [x ] NO     Chest Pain: [ ] YES [x ] NO    SOB:  [ ] YES [x ] NO    MEDICATIONS  (STANDING):  aspirin enteric coated 81 milliGRAM(s) Oral daily  atorvastatin 10 milliGRAM(s) Oral at bedtime  carvedilol 25 milliGRAM(s) Oral every 12 hours  cloNIDine 0.1 milliGRAM(s) Oral two times a day  docusate sodium 100 milliGRAM(s) Oral three times a day  famotidine    Tablet 20 milliGRAM(s) Oral daily  gabapentin 300 milliGRAM(s) Oral daily  heparin  Injectable 5000 Unit(s) SubCutaneous every 8 hours  hydrALAZINE 100 milliGRAM(s) Oral every 8 hours  isosorbide   mononitrate ER Tablet (IMDUR) 60 milliGRAM(s) Oral daily  losartan 100 milliGRAM(s) Oral daily  sevelamer hydrochloride 1600 milliGRAM(s) Oral three times a day with meals    MEDICATIONS  (PRN):  albumin human 25% IVPB 50 milliLiter(s) IV Intermittent every 1 hour PRN HD  oxyCODONE    5 mG/acetaminophen 325 mG 1 Tablet(s) Oral every 6 hours PRN Moderate Pain (4 - 6)  oxyCODONE    5 mG/acetaminophen 325 mG 2 Tablet(s) Oral every 4 hours PRN Severe Pain (7 - 10)    albumin human 25% IVPB 50 milliLiter(s) IV Intermittent every 1 hour PRN  aspirin enteric coated 81 milliGRAM(s) Oral daily  atorvastatin 10 milliGRAM(s) Oral at bedtime  carvedilol 25 milliGRAM(s) Oral every 12 hours  cloNIDine 0.1 milliGRAM(s) Oral two times a day  docusate sodium 100 milliGRAM(s) Oral three times a day  famotidine    Tablet 20 milliGRAM(s) Oral daily  gabapentin 300 milliGRAM(s) Oral daily  heparin  Injectable 5000 Unit(s) SubCutaneous every 8 hours  hydrALAZINE 100 milliGRAM(s) Oral every 8 hours  isosorbide   mononitrate ER Tablet (IMDUR) 60 milliGRAM(s) Oral daily  losartan 100 milliGRAM(s) Oral daily  oxyCODONE    5 mG/acetaminophen 325 mG 1 Tablet(s) Oral every 6 hours PRN  oxyCODONE    5 mG/acetaminophen 325 mG 2 Tablet(s) Oral every 4 hours PRN  sevelamer hydrochloride 1600 milliGRAM(s) Oral three times a day with meals    Allergies    morphine (Other)  nitroglycerin (Anaphylaxis)  shellfish (Anaphylaxis)    Intolerances          Vital Signs Last 24 Hrs  T(C): 36.2 (21 Dec 2017 05:00), Max: 37.2 (20 Dec 2017 20:17)  T(F): 97.2 (21 Dec 2017 05:00), Max: 99 (20 Dec 2017 20:17)  HR: 60 (21 Dec 2017 05:00) (53 - 63)  BP: 161/83 (21 Dec 2017 05:00) (111/69 - 177/84)  BP(mean): --  RR: 17 (21 Dec 2017 05:00) (17 - 18)  SpO2: 98% (21 Dec 2017 05:00) (98% - 100%)    I&O's Summary    20 Dec 2017 07:01  -  21 Dec 2017 07:00  --------------------------------------------------------  IN: 360 mL / OUT: 5050 mL / NET: -4690 mL        Gen: AAOx3, NAD  Pulm: Normal work of breathing on RA  CV: RRR   RUE:+ Brachiocephalic AVF pulsatile thrill with a pseudoaneurysmal appearance, no edema/erythema, 2+radial pulse. Strength 5/5 equal bilat, decreased sensation in right thumb.  LE: no edema, calfs soft and non tender    LABS:                        9.8    4.4   )-----------( 122      ( 21 Dec 2017 02:34 )             30.1     12-21    132<L>  |  89<L>  |  38<H>  ----------------------------<  91  4.4   |  26  |  6.92<H>    Ca    9.4      21 Dec 2017 02:34  Phos  8.2     12-20  Mg     2.2     12-21    TPro  7.2  /  Alb  4.3  /  TBili  0.7  /  DBili  x   /  AST  12  /  ALT  <5<L>  /  AlkPhos  45  12-20    PT/INR - ( 20 Dec 2017 15:54 )   PT: 14.1 sec;   INR: 1.26          PTT - ( 20 Dec 2017 15:54 )  PTT:47.6 sec    LIVER FUNCTIONS - ( 20 Dec 2017 15:54 )  Alb: 4.3 g/dL / Pro: 7.2 g/dL / ALK PHOS: 45 U/L / ALT: <5 U/L / AST: 12 U/L / GGT: x           CAPILLARY BLOOD GLUCOSE          RADIOLOGY & ADDITIONAL TESTS:      38 yo M with ESRD on HD (MWF) presents with RUE AVF malfunction, and STEAL syndrome and pseudoaneurysm  -US of the RUE  -Pain control  -asa  -Home meds  -NPO  -Renal consult for HD MWF (Dr. Veloz)  -DVT ppx with HSQ  -OR For fistula revision

## 2017-12-21 NOTE — BRIEF OPERATIVE NOTE - POST-OP DX
Oriented - self; Oriented - place; Oriented - time ESRD (end stage renal disease) on dialysis  12/21/2017    Active  Ruslan Chandra

## 2017-12-22 ENCOUNTER — TRANSCRIPTION ENCOUNTER (OUTPATIENT)
Age: 37
End: 2017-12-22

## 2017-12-22 VITALS — SYSTOLIC BLOOD PRESSURE: 150 MMHG | DIASTOLIC BLOOD PRESSURE: 77 MMHG | HEART RATE: 73 BPM

## 2017-12-22 LAB
ANION GAP SERPL CALC-SCNC: 21 MMOL/L — HIGH (ref 5–17)
BUN SERPL-MCNC: 25 MG/DL — HIGH (ref 7–23)
BUN SERPL-MCNC: 66 MG/DL — HIGH (ref 7–23)
CALCIUM SERPL-MCNC: 9.1 MG/DL — SIGNIFICANT CHANGE UP (ref 8.4–10.5)
CHLORIDE SERPL-SCNC: 92 MMOL/L — LOW (ref 96–108)
CO2 SERPL-SCNC: 23 MMOL/L — SIGNIFICANT CHANGE UP (ref 22–31)
CREAT SERPL-MCNC: 11 MG/DL — HIGH (ref 0.5–1.3)
GLUCOSE SERPL-MCNC: 98 MG/DL — SIGNIFICANT CHANGE UP (ref 70–99)
HCT VFR BLD CALC: 29 % — LOW (ref 39–50)
HGB BLD-MCNC: 9.3 G/DL — LOW (ref 13–17)
MAGNESIUM SERPL-MCNC: 2.3 MG/DL — SIGNIFICANT CHANGE UP (ref 1.6–2.6)
MCHC RBC-ENTMCNC: 30.8 PG — SIGNIFICANT CHANGE UP (ref 27–34)
MCHC RBC-ENTMCNC: 32.1 G/DL — SIGNIFICANT CHANGE UP (ref 32–36)
MCV RBC AUTO: 96 FL — SIGNIFICANT CHANGE UP (ref 80–100)
PHOSPHATE SERPL-MCNC: 8.6 MG/DL — HIGH (ref 2.5–4.5)
PLATELET # BLD AUTO: 113 K/UL — LOW (ref 150–400)
POTASSIUM SERPL-MCNC: 5.6 MMOL/L — HIGH (ref 3.5–5.3)
POTASSIUM SERPL-SCNC: 5.6 MMOL/L — HIGH (ref 3.5–5.3)
RBC # BLD: 3.02 M/UL — LOW (ref 4.2–5.8)
RBC # FLD: 15.1 % — SIGNIFICANT CHANGE UP (ref 10.3–16.9)
SODIUM SERPL-SCNC: 136 MMOL/L — SIGNIFICANT CHANGE UP (ref 135–145)
WBC # BLD: 4.6 K/UL — SIGNIFICANT CHANGE UP (ref 3.8–10.5)
WBC # FLD AUTO: 4.6 K/UL — SIGNIFICANT CHANGE UP (ref 3.8–10.5)

## 2017-12-22 PROCEDURE — 84100 ASSAY OF PHOSPHORUS: CPT

## 2017-12-22 PROCEDURE — 76882 US LMTD JT/FCL EVL NVASC XTR: CPT

## 2017-12-22 PROCEDURE — 84520 ASSAY OF UREA NITROGEN: CPT

## 2017-12-22 PROCEDURE — 80053 COMPREHEN METABOLIC PANEL: CPT

## 2017-12-22 PROCEDURE — 93005 ELECTROCARDIOGRAM TRACING: CPT

## 2017-12-22 PROCEDURE — 36430 TRANSFUSION BLD/BLD COMPNT: CPT

## 2017-12-22 PROCEDURE — 85027 COMPLETE CBC AUTOMATED: CPT

## 2017-12-22 PROCEDURE — 90935 HEMODIALYSIS ONE EVALUATION: CPT | Mod: GC

## 2017-12-22 PROCEDURE — 86901 BLOOD TYPING SEROLOGIC RH(D): CPT

## 2017-12-22 PROCEDURE — 86900 BLOOD TYPING SEROLOGIC ABO: CPT

## 2017-12-22 PROCEDURE — 83550 IRON BINDING TEST: CPT

## 2017-12-22 PROCEDURE — 90935 HEMODIALYSIS ONE EVALUATION: CPT

## 2017-12-22 PROCEDURE — P9047: CPT

## 2017-12-22 PROCEDURE — 36415 COLL VENOUS BLD VENIPUNCTURE: CPT

## 2017-12-22 PROCEDURE — C1889: CPT

## 2017-12-22 PROCEDURE — 86850 RBC ANTIBODY SCREEN: CPT

## 2017-12-22 PROCEDURE — 83735 ASSAY OF MAGNESIUM: CPT

## 2017-12-22 PROCEDURE — 71045 X-RAY EXAM CHEST 1 VIEW: CPT

## 2017-12-22 PROCEDURE — 85730 THROMBOPLASTIN TIME PARTIAL: CPT

## 2017-12-22 PROCEDURE — 86923 COMPATIBILITY TEST ELECTRIC: CPT

## 2017-12-22 PROCEDURE — 82728 ASSAY OF FERRITIN: CPT

## 2017-12-22 PROCEDURE — P9016: CPT

## 2017-12-22 PROCEDURE — 80048 BASIC METABOLIC PNL TOTAL CA: CPT

## 2017-12-22 PROCEDURE — 85610 PROTHROMBIN TIME: CPT

## 2017-12-22 RX ORDER — DIPHENHYDRAMINE HCL 50 MG
50 CAPSULE ORAL ONCE
Qty: 0 | Refills: 0 | Status: COMPLETED | OUTPATIENT
Start: 2017-12-22 | End: 2017-12-22

## 2017-12-22 RX ORDER — ERYTHROPOIETIN 10000 [IU]/ML
7000 INJECTION, SOLUTION INTRAVENOUS; SUBCUTANEOUS ONCE
Qty: 0 | Refills: 0 | Status: COMPLETED | OUTPATIENT
Start: 2017-12-22 | End: 2017-12-22

## 2017-12-22 RX ADMIN — Medication 81 MILLIGRAM(S): at 14:24

## 2017-12-22 RX ADMIN — Medication 50 MILLIGRAM(S): at 09:55

## 2017-12-22 RX ADMIN — CARVEDILOL PHOSPHATE 25 MILLIGRAM(S): 80 CAPSULE, EXTENDED RELEASE ORAL at 14:28

## 2017-12-22 RX ADMIN — FAMOTIDINE 20 MILLIGRAM(S): 10 INJECTION INTRAVENOUS at 14:23

## 2017-12-22 RX ADMIN — GABAPENTIN 300 MILLIGRAM(S): 400 CAPSULE ORAL at 14:24

## 2017-12-22 RX ADMIN — ERYTHROPOIETIN 7000 UNIT(S): 10000 INJECTION, SOLUTION INTRAVENOUS; SUBCUTANEOUS at 11:10

## 2017-12-22 RX ADMIN — OXYCODONE AND ACETAMINOPHEN 1 TABLET(S): 5; 325 TABLET ORAL at 08:30

## 2017-12-22 RX ADMIN — LOSARTAN POTASSIUM 100 MILLIGRAM(S): 100 TABLET, FILM COATED ORAL at 14:29

## 2017-12-22 RX ADMIN — OXYCODONE AND ACETAMINOPHEN 1 TABLET(S): 5; 325 TABLET ORAL at 14:23

## 2017-12-22 RX ADMIN — OXYCODONE AND ACETAMINOPHEN 1 TABLET(S): 5; 325 TABLET ORAL at 14:58

## 2017-12-22 RX ADMIN — OXYCODONE AND ACETAMINOPHEN 1 TABLET(S): 5; 325 TABLET ORAL at 00:22

## 2017-12-22 RX ADMIN — Medication 100 MILLIGRAM(S): at 14:24

## 2017-12-22 RX ADMIN — SEVELAMER CARBONATE 1600 MILLIGRAM(S): 2400 POWDER, FOR SUSPENSION ORAL at 07:36

## 2017-12-22 RX ADMIN — OXYCODONE AND ACETAMINOPHEN 1 TABLET(S): 5; 325 TABLET ORAL at 07:41

## 2017-12-22 RX ADMIN — Medication 0.1 MILLIGRAM(S): at 14:28

## 2017-12-22 RX ADMIN — OXYCODONE AND ACETAMINOPHEN 1 TABLET(S): 5; 325 TABLET ORAL at 01:22

## 2017-12-22 RX ADMIN — SEVELAMER CARBONATE 1600 MILLIGRAM(S): 2400 POWDER, FOR SUSPENSION ORAL at 14:24

## 2017-12-22 NOTE — PROGRESS NOTE ADULT - SUBJECTIVE AND OBJECTIVE BOX
Neurology Follow up note    Name  KELLY GROVE    HPI:  Pt is a 38 y/o M with ESRD on HD (M/W/F - last full dialysis 12/18/17 ), presents with right thumb numbness and tingling in the hand as well as issues with HD x 2 weeks. Other PMH malignant HTN, s/p Type A dissection repair with Dr. Moreno in 2015, pericardial window, sternal wound reconstruction with pec flap, and type B dissection repair at [facility] in 04/2016, LUE steal syndrome associated with the AV fistula, s/p Left AVF ligation and s/p Right brachiocephalic fistula with vein patch 6/22/17. Patient with previous admissions for similar symptoms s/p RE fistuloplasty on 11/14/17. Since then over the last 2 weeks he's been noticing clots during dialysis causing it to be stopped 1-1 1/2 hours early as well as prolonged bleeding. He also noted tingling in right hand and the thumb going numb in the last few days. Denies fever/chills, weakness/coldness. (20 Dec 2017 13:09)      Interval History - continued to have weakness in the RUE with numbness        REVIEW OF SYSTEMS    Vital Signs Last 24 Hrs  T(C): 36.5 (22 Dec 2017 05:30), Max: 37 (21 Dec 2017 12:51)  T(F): 97.7 (22 Dec 2017 05:30), Max: 98.6 (21 Dec 2017 12:51)  HR: 67 (22 Dec 2017 05:30) (54 - 74)  BP: 117/71 (22 Dec 2017 05:30) (75/46 - 124/64)  BP(mean): 84 (21 Dec 2017 14:00) (66 - 96)  RR: 17 (22 Dec 2017 05:30) (14 - 17)  SpO2: 98% (22 Dec 2017 05:30) (94% - 100%)    Physical Exam-     Mental Status- awake and alert    Cranial Nerves-full EOM    Gait and station-n/a    Motor- moves all 4 extremities- Median nerve 4-/5    Reflexes- decreased    Sensation- no sensory level    Coordination- no tremors    Vascular -     Medications  acetaminophen   Tablet. 650 milliGRAM(s) Oral every 6 hours PRN  aspirin enteric coated 81 milliGRAM(s) Oral daily  atorvastatin 10 milliGRAM(s) Oral at bedtime  carvedilol 25 milliGRAM(s) Oral every 12 hours  cloNIDine 0.1 milliGRAM(s) Oral two times a day  docusate sodium 100 milliGRAM(s) Oral three times a day  epoetin maite Injectable 7000 Unit(s) IV Push once  famotidine    Tablet 20 milliGRAM(s) Oral daily  gabapentin 300 milliGRAM(s) Oral daily  heparin  Injectable 5000 Unit(s) SubCutaneous every 8 hours  hydrALAZINE 100 milliGRAM(s) Oral every 8 hours  isosorbide   mononitrate ER Tablet (IMDUR) 60 milliGRAM(s) Oral daily  losartan 100 milliGRAM(s) Oral daily  oxyCODONE    5 mG/acetaminophen 325 mG 1 Tablet(s) Oral every 4 hours PRN  sevelamer hydrochloride 1600 milliGRAM(s) Oral three times a day with meals      Lab      Radiology    Assessment- RUE numbness     Plan- I will FU as OP

## 2017-12-22 NOTE — DISCHARGE NOTE ADULT - HOSPITAL COURSE
HPI:  Pt is a 36 y/o M with ESRD on HD (M/W/F - last full dialysis 12/18/17 ), presents with right thumb numbness and tingling in the hand as well as issues with HD x 2 weeks. Other PMH malignant HTN, s/p Type A dissection repair with Dr. Moreno in 2015, pericardial window, sternal wound reconstruction with pec flap, and type B dissection repair at [facility] in 04/2016, LUE steal syndrome associated with the AV fistula, s/p Left AVF ligation and s/p Right brachiocephalic fistula with vein patch 6/22/17. Patient with previous admissions for similar symptoms s/p RE fistuloplasty on 11/14/17. Since then over the last 2 weeks he's been noticing clots during dialysis causing it to be stopped 1-1 1/2 hours early as well as prolonged bleeding. He also noted tingling in right hand and the thumb going numb in the last few days. Denies fever/chills, weakness/coldness.     On 12/21 he underwent Proximal cephalic stricture excision with bovine pericardium patch, which was well tolerate. He received HD on 12/22 prior to discharge. He has remained Hemodynamically viky, afebrile and is ready for discharge. HPI:  Pt is a 38 y/o M with ESRD on HD (M/W/F - last full dialysis 12/18/17 ), presents with right thumb numbness and tingling in the hand as well as issues with HD x 2 weeks. Other PMH malignant HTN, s/p Type A dissection repair with Dr. Moreno in 2015, pericardial window, sternal wound reconstruction with pec flap, and type B dissection repair at [facility] in 04/2016, LUE steal syndrome associated with the AV fistula, s/p Left AVF ligation and s/p Right brachiocephalic fistula with vein patch 6/22/17. Patient with previous admissions for similar symptoms s/p RE fistuloplasty on 11/14/17. Since then over the last 2 weeks he's been noticing clots during dialysis causing it to be stopped 1-1 1/2 hours early as well as prolonged bleeding. He also noted tingling in right hand and the thumb going numb in the last few days. Denies fever/chills, weakness/coldness.     He was admitted to the Vascular surgery service, pre-oped and transfused 1 u PRBC with HD. On 12/21 he underwent Proximal cephalic stricture excision with bovine pericardium patch, which was well tolerate. He received HD on 12/22 prior to discharge. He has remained Hemodynamically viky, afebrile and is ready for discharge.

## 2017-12-22 NOTE — DISCHARGE NOTE ADULT - MEDICATION SUMMARY - MEDICATIONS TO TAKE
I will START or STAY ON the medications listed below when I get home from the hospital:    aspirin 81 mg oral delayed release tablet  -- 1 tab(s) by mouth once a day  -- Indication: For ASA    oxyCODONE-acetaminophen 5 mg-325 mg oral tablet  -- 1 tab(s) by mouth every 6 hours MDD:4  -- Indication: For Severe pain    losartan 50 mg oral tablet  -- 2 tab(s) by mouth once a day  -- Do not take this drug if you are pregnant.  It is very important that you take or use this exactly as directed.  Do not skip doses or discontinue unless directed by your doctor.  Some non-prescription drugs may aggravate your condition.  Read all labels carefully.  If a warning appears, check with your doctor before taking.    -- Indication: For HTN    cloNIDine 0.1 mg oral tablet  -- 1 tab(s) by mouth 2 times a day  -- Indication: For HTN    cloNIDine 0.1 mg oral tablet  -- 1 tab(s) by mouth 2 times a day  -- It is very important that you take or use this exactly as directed.  Do not skip doses or discontinue unless directed by your doctor.  May cause drowsiness.  Alcohol may intensify this effect.  Use care when operating dangerous machinery.  Some non-prescription drugs may aggravate your condition.  Read all labels carefully.  If a warning appears, check with your doctor before taking.    -- Indication: For HTN    isosorbide mononitrate 60 mg oral tablet, extended release  -- 1 tab(s) by mouth once a day  -- Indication: For HTN    gabapentin 300 mg oral capsule  -- 1 cap(s) by mouth once a day  -- It is very important that you take or use this exactly as directed.  Do not skip doses or discontinue unless directed by your doctor.  May cause drowsiness.  Alcohol may intensify this effect.  Use care when operating dangerous machinery.    -- Indication: For HTN    atorvastatin 10 mg oral tablet  -- 1 tab(s) by mouth once a day (at bedtime)  -- Indication: For HLD    carvedilol 25 mg oral tablet  -- 2 tab(s) by mouth every 12 hours  -- Indication: For HTN    famotidine 20 mg oral tablet  -- 1 tab(s) by mouth once a day  -- Indication: For GERD    docusate sodium 100 mg oral capsule  -- 1 cap(s) by mouth once a day  -- Indication: For COnstipation    polyethylene glycol 3350 oral powder for reconstitution  -- 17 gram(s) by mouth once a day, As needed, Constipation  -- Indication: For COnstipation    senna oral tablet  -- 2 tab(s) by mouth once a day (at bedtime)   -- Indication: For COnstipation    Renagel 800 mg oral tablet  -- 2 tab(s) by mouth 3 times a day  -- Indication: For ESRD    hydrALAZINE 25 mg oral tablet  -- 4 tab(s) by mouth every 8 hours  -- It is very important that you take or use this exactly as directed.  Do not skip doses or discontinue unless directed by your doctor.  Some non-prescription drugs may aggravate your condition.  Read all labels carefully.  If a warning appears, check with your doctor before taking.    -- Indication: For htn

## 2017-12-22 NOTE — PROGRESS NOTE ADULT - ATTENDING COMMENTS
seen on HD - tolerating well  cont as above  will address lowering meds with hypotension in pm
seen on HD- tolerating rx   no complaints VSS  cont HD as above
HD today via AVF
volume, lytes ok  HD tomorrow am

## 2017-12-22 NOTE — DISCHARGE NOTE ADULT - CARE PROVIDER_API CALL
Anil Ingram), Surgery; Vascular Surgery  [address]  [address]  [place], [place] [address]  Phone: [phone]  Fax: [phone]

## 2017-12-22 NOTE — DISCHARGE NOTE ADULT - PATIENT PORTAL LINK FT
“You can access the Follow[facility] Patient Portal, offered by [facility], by registering with the following website: [email or web address]”

## 2017-12-22 NOTE — PROGRESS NOTE ADULT - ASSESSMENT
37M PMhx of ESRD on HD M/W/F since 11/2014 via RIGHT AVF (started use approximately early October 2017, previously had a RIJ permacath and prior left brachiocephalic AVF), HTN, anemia of CKD, CKD-MBD, HLD, thoracoabdominal aortic aneurysm repair, s/p Type A dissection repair with Dr. Moreno in 2015, pericardial window, sternal wound reconstruction with pec flap, and type B dissection repair at [facility] in 04/2016, LUE steal syndrome associated with the AV fistula, s/p Left AVF ligation and s/p Right brachiocephalic fistula with vein patch, recent RUE AVF fistuloplasty on 11/14/2017 for prolonged bleeding time who presents today to vascular service for scheduled RUE AVF angiogram on 12/21/2017 in light of having distal numbness and paresthesias (found to be median neuritis) along with having a two week history of prolonged bleeding and post HD blood clots being aspirated from the HD needles.
37M PMhx of ESRD on HD M/W/F since 11/2014 via RIGHT AVF (started use approximately early October 2017, previously had a RIJ permacath and prior left brachiocephalic AVF), HTN, anemia of CKD, CKD-MBD, HLD, thoracoabdominal aortic aneurysm repair, s/p Type A dissection repair with Dr. 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

## 2017-12-22 NOTE — PROGRESS NOTE ADULT - PROBLEM SELECTOR PLAN 1
M/W/F schedule  Chemistries and volume status appropriate  On next HD will continue to challenge his DW.
M/W/F schedule  Proceed with HD as ordered today  Although he can reach EDW of 82.5kg, will be cautious in approaching this based on his hypotension last night. Though having his high number of BP medications is likely compounding nadirs in BP

## 2017-12-22 NOTE — PROGRESS NOTE ADULT - RS GEN PE MLT RESP DETAILS PC
no rhonchi/no wheezes/clear to auscultation bilaterally/airway patent/normal
airway patent/normal/no wheezes/clear to auscultation bilaterally/no rales/no rhonchi

## 2017-12-22 NOTE — PROGRESS NOTE ADULT - PROBLEM SELECTOR PLAN 6
To follow up with neurology Dr. Hyman as outpatient
To follow up with neurology Dr. Hyman as outpatient

## 2017-12-22 NOTE — PROGRESS NOTE ADULT - PROBLEM SELECTOR PLAN 2
s/p OR for fistulogram today for suspected venous outflow obstruction s/p OR for fistulogram and revision of AVF

## 2017-12-22 NOTE — PROGRESS NOTE ADULT - PROVIDER SPECIALTY LIST ADULT
Nephrology
Neurology
Neurology
Vascular Surgery
Nephrology

## 2017-12-22 NOTE — PROGRESS NOTE ADULT - SUBJECTIVE AND OBJECTIVE BOX
Patient was seen and evaluated on dialysis.   Patient is tolerating the procedure well.   HR: 66 (12-22-17 @ 09:55)  BP: 137/93 (12-22-17 @ 09:55)  Continue dialysis:   Dialyzer:  Optiflux 200        QB:  400      QD: 500 2K bath  Dialysate temperature 35.5C   Goal UF 4kg over 4 Hours     preHD weight: 87.4kg standing  he has reached 82.5kg as an outpatient  The patient requests 5L UF    I have informed the patient that would be too much for him, especially with the brief episode of hypotension last evening.  In review, his simultaneous administration of BP medications in the evening may have been a reason for the dip in BP at night.   Agreed on 4L of net UF and will use cool dialysate  Will use albumin as needed to maintain his BP intradialysis

## 2017-12-22 NOTE — PROGRESS NOTE ADULT - PROBLEM SELECTOR PLAN 3
Continue with  hydrALAZINE 100 milliGRAM(s) Oral every 8 hours  isosorbide   mononitrate ER Tablet (IMDUR) 60 milliGRAM(s) Oral daily  losartan 100 milliGRAM(s) Oral daily  carvedilol 25 milliGRAM(s) Oral every 12 hours  cloNIDine 0.1 milliGRAM(s) Oral two times a day.     Outpatient plan is slow taper off of clonidine per Dr. Veloz during DW challenge Reduce  hydrALAZINE 100 milliGRAM(s) Oral every 8 hours   to  50mg every 8 hours  Continue with  isosorbide   mononitrate ER Tablet (IMDUR) 60 milliGRAM(s) Oral daily  losartan 100 milliGRAM(s) Oral daily  carvedilol 25 milliGRAM(s) Oral every 12 hours  cloNIDine 0.1 milliGRAM(s) Oral two times a day.     Outpatient plan is slow taper off of clonidine per Dr. Veloz during DW challenge

## 2017-12-22 NOTE — PROGRESS NOTE ADULT - SUBJECTIVE AND OBJECTIVE BOX
Patient seen and examined at bedside.     Seen in HD unit  Feels well  No dyspnea, no orthopnea, no leg swelling  Right AVF revision performed    Patient requesting IV benadryl for pruritus on HD. This occurs every session for him even at the outpatient HD unit. This is a known phenomenon of patients on HD.     acetaminophen   Tablet. 650 milliGRAM(s) every 6 hours PRN  aspirin enteric coated 81 milliGRAM(s) daily  atorvastatin 10 milliGRAM(s) at bedtime  carvedilol 25 milliGRAM(s) every 12 hours  cloNIDine 0.1 milliGRAM(s) two times a day  docusate sodium 100 milliGRAM(s) three times a day  epoetin maite Injectable 7000 Unit(s) once  famotidine    Tablet 20 milliGRAM(s) daily  gabapentin 300 milliGRAM(s) daily  heparin  Injectable 5000 Unit(s) every 8 hours  hydrALAZINE 100 milliGRAM(s) every 8 hours  isosorbide   mononitrate ER Tablet (IMDUR) 60 milliGRAM(s) daily  losartan 100 milliGRAM(s) daily  oxyCODONE    5 mG/acetaminophen 325 mG 1 Tablet(s) every 4 hours PRN  sevelamer hydrochloride 1600 milliGRAM(s) three times a day with meals      Allergies    morphine (Other)  nitroglycerin (Anaphylaxis)  shellfish (Anaphylaxis)    Intolerances        T(C): , Max: 37 (12-21-17 @ 12:51)  T(F): , Max: 98.6 (12-21-17 @ 12:51)  HR: 56 (12-22-17 @ 08:30)  BP: 152/77 (12-22-17 @ 08:30)  BP(mean): 84 (12-21-17 @ 14:00)  RR: 16 (12-22-17 @ 08:30)  SpO2: 100% (12-22-17 @ 08:30)  Wt(kg): --    12-21 @ 07:01  -  12-22 @ 07:00  --------------------------------------------------------  IN:    Lactated Ringers IV Bolus: 600 mL    Sodium Chloride 0.9% IV Bolus: 500 mL  Total IN: 1100 mL    OUT:  Total OUT: 0 mL    Total NET: 1100 mL          Weight (kg): 86.2 (12-22 @ 06:43)      LABS:                        9.3    4.6   )-----------( 113      ( 22 Dec 2017 07:06 )             29.0     12-22    136  |  92<L>  |  66<H>  ----------------------------<  98  5.6<H>   |  23  |  11.00<H>    Ca    9.1      22 Dec 2017 07:06  Phos  8.6     12-22  Mg     2.3     12-22    TPro  7.2  /  Alb  4.3  /  TBili  0.7  /  DBili  x   /  AST  12  /  ALT  <5<L>  /  AlkPhos  45  12-20      PT/INR - ( 20 Dec 2017 15:54 )   PT: 14.1 sec;   INR: 1.26          PTT - ( 20 Dec 2017 15:54 )  PTT:47.6 sec          RADIOLOGY & ADDITIONAL STUDIES:

## 2017-12-22 NOTE — DISCHARGE NOTE ADULT - PLAN OF CARE
Functioning fistula Follow up with Dr. Ingram in 1-2 weeks. Call the office at  to schedule your appointment. Instructions for follow-up, activity and diet:	Please resume all regular home medications unless specifically advised not to take a particular medication. Also, please take any new medications as prescribed.  Please get plenty of rest, continue to ambulate several times per day, and drink adequate amounts of fluids. Avoid lifting weights greater than 5-10 lbs until you follow-up with your surgeon, who will instruct you further regarding activity restrictions.  Avoid driving or operating heavy machinery while taking pain medications. Follow up with Dr. Ingram in 1-2 weeks. Call the office at  to schedule your appointment. Instructions for follow-up, activity and diet: Please continue to eat your renal diet as directed by your nephrologist. Please do not allow HD access in proximal portion of your fistula until cleared by Dr Ingram.  	Please resume all regular home medications unless specifically advised not to take a particular medication. Also, please take any new medications as prescribed.  Please get plenty of rest, continue to ambulate several times per day, and drink adequate amounts of fluids. Avoid lifting weights greater than 5-10 lbs until you follow-up with your surgeon, who will instruct you further regarding activity restrictions.  Avoid driving or operating heavy machinery while taking pain medications. Follow up with Dr. Ingram in 1-2 weeks. Call the office at  to schedule your appointment. Instructions for follow-up, activity and diet: Please continue to eat your renal diet as directed by your nephrologist. Please do not allow HD access in proximal portion of your fistula until cleared by Dr Ingram.  	Please resume all regular home medications unless specifically advised not to take a particular medication. Also, please take any new medications as prescribed.  Please get plenty of rest, continue to ambulate several times per day, and drink adequate amounts of fluids. Avoid lifting weights greater than 5-10 lbs until you follow-up with your surgeon, who will instruct you further regarding activity restrictions.  Avoid driving or operating heavy machinery while taking pain medications.    You may take Percocet or tylenol every 6 hours for pain. Please keep track of how much Tylenol (acetaminophen) you are taking. Do not take more than 4,000 mg per day. Be aware that Percocet and Tylenol #3 has tylenol in it.     You may shower letting soap and water run over incision site. Do not scrub. Pat dry with towel when finished.

## 2017-12-22 NOTE — DISCHARGE NOTE ADULT - CARE PLAN
Principal Discharge DX:	AVF (arteriovenous fistula)  Goal:	Functioning fistula  Instructions for follow-up, activity and diet:	Follow up with Dr. Ingram in 1-2 weeks. Call the office at  to schedule your appointment. Instructions for follow-up, activity and diet:	Please resume all regular home medications unless specifically advised not to take a particular medication. Also, please take any new medications as prescribed.  Please get plenty of rest, continue to ambulate several times per day, and drink adequate amounts of fluids. Avoid lifting weights greater than 5-10 lbs until you follow-up with your surgeon, who will instruct you further regarding activity restrictions.  Avoid driving or operating heavy machinery while taking pain medications. Principal Discharge DX:	AVF (arteriovenous fistula)  Goal:	Functioning fistula  Instructions for follow-up, activity and diet:	Follow up with Dr. Ingram in 1-2 weeks. Call the office at  to schedule your appointment. Instructions for follow-up, activity and diet: Please continue to eat your renal diet as directed by your nephrologist. Please do not allow HD access in proximal portion of your fistula until cleared by Dr Ingram.  	Please resume all regular home medications unless specifically advised not to take a particular medication. Also, please take any new medications as prescribed.  Please get plenty of rest, continue to ambulate several times per day, and drink adequate amounts of fluids. Avoid lifting weights greater than 5-10 lbs until you follow-up with your surgeon, who will instruct you further regarding activity restrictions.  Avoid driving or operating heavy machinery while taking pain medications. Principal Discharge DX:	AVF (arteriovenous fistula)  Goal:	Functioning fistula  Instructions for follow-up, activity and diet:	Follow up with Dr. Ingram in 1-2 weeks. Call the office at  to schedule your appointment. Instructions for follow-up, activity and diet: Please continue to eat your renal diet as directed by your nephrologist. Please do not allow HD access in proximal portion of your fistula until cleared by Dr Ingram.  	Please resume all regular home medications unless specifically advised not to take a particular medication. Also, please take any new medications as prescribed.  Please get plenty of rest, continue to ambulate several times per day, and drink adequate amounts of fluids. Avoid lifting weights greater than 5-10 lbs until you follow-up with your surgeon, who will instruct you further regarding activity restrictions.  Avoid driving or operating heavy machinery while taking pain medications.    You may take Percocet or tylenol every 6 hours for pain. Please keep track of how much Tylenol (acetaminophen) you are taking. Do not take more than 4,000 mg per day. Be aware that Percocet and Tylenol #3 has tylenol in it.     You may shower letting soap and water run over incision site. Do not scrub. Pat dry with towel when finished.

## 2017-12-22 NOTE — PROGRESS NOTE ADULT - SUBJECTIVE AND OBJECTIVE BOX
o/n: sbp 70s 250cc bolus x2 given  12/21: s/p RUE AVF revision, POC ok       38 yo M with ESRD on HD (MWF) presents with RUE AVF malfunction, and STEAL syndrome and pseudoaneurysm  -US of the RUE  -Pain control  -asa  -Home meds  -Renal diet/NPO p mn  -Renal consult for HD MWF (Dr. Veloz)  -DVT ppx with HSQ  -OR thursday 12/21 for fistulogram o/n: sbp 70s 250cc bolus x2 given  12/21: s/p RUE AVF revision, POC ok       38 yo M with ESRD on HD (MWF) presents with RUE AVF malfunction, and STEAL syndrome and pseudoaneurysm  -US of the RUE  -Pain control  -asa  -Home meds  -Renal diet  -Renal consult for HD MWF (Dr. Veloz)  -DVT ppx with HSQ o/n: sbp 70s 250cc bolus x2 given  12/21: s/p RUE AVF revision, POC ok     Subjective: numbness improving in right thumb. Pain controlled around right arm incision site. No fevers.         Vital Signs Last 24 Hrs  T(C): 36.8 (22 Dec 2017 09:55), Max: 37 (21 Dec 2017 12:51)  T(F): 98.2 (22 Dec 2017 09:55), Max: 98.6 (21 Dec 2017 12:51)  HR: 66 (22 Dec 2017 09:55) (54 - 74)  BP: 137/93 (22 Dec 2017 09:55) (75/46 - 152/77)  BP(mean): 84 (21 Dec 2017 14:00) (66 - 96)  RR: 18 (22 Dec 2017 09:55) (14 - 18)  SpO2: 99% (22 Dec 2017 09:55) (94% - 100%)    I&O's Summary    21 Dec 2017 07:01  -  22 Dec 2017 07:00  --------------------------------------------------------  IN: 1100 mL / OUT: 0 mL / NET: 1100 mL    22 Dec 2017 07:01  -  22 Dec 2017 10:39  --------------------------------------------------------  IN: 200 mL / OUT: 0 mL / NET: 200 mL        Physical Exam:  General: NAD, resting comfortably  Pulmonary: normal resp effort  Cardiovascular: NSR  Abdominal: soft, NT/ND  Extremities: RUE brachiocephalic AVF with palpable thrill, palpable right radial pulse, incision clean and intact, minimal oozing    LABS:                        9.3    4.6   )-----------( 113      ( 22 Dec 2017 07:06 )             29.0     12-22    136  |  92<L>  |  66<H>  ----------------------------<  98  5.6<H>   |  23  |  11.00<H>    Ca    9.1      22 Dec 2017 07:06  Phos  8.6     12-22  Mg     2.3     12-22    TPro  7.2  /  Alb  4.3  /  TBili  0.7  /  DBili  x   /  AST  12  /  ALT  <5<L>  /  AlkPhos  45  12-20    PT/INR - ( 20 Dec 2017 15:54 )   PT: 14.1 sec;   INR: 1.26          PTT - ( 20 Dec 2017 15:54 )  PTT:47.6 sec    LIVER FUNCTIONS - ( 20 Dec 2017 15:54 )  Alb: 4.3 g/dL / Pro: 7.2 g/dL / ALK PHOS: 45 U/L / ALT: <5 U/L / AST: 12 U/L / GGT: x           CAPILLARY BLOOD GLUCOSE          RADIOLOGY & ADDITIONAL TESTS:        36 yo M with ESRD on HD (MW) presents with RUE AVF outflow stenosis 2/2 stricture, POD 1 s/p patch venoplasty    Plan:  -Pain control  -asa  -Home meds  -Renal diet  -Renal consult for HD MW (Dr. Veloz)  -DVT ppx with HSQ  - HD today then home, f/u with Dr. Ingram in office in 2 weeks for suture removal

## 2017-12-26 DIAGNOSIS — Z79.82 LONG TERM (CURRENT) USE OF ASPIRIN: ICD-10-CM

## 2017-12-26 DIAGNOSIS — I12.0 HYPERTENSIVE CHRONIC KIDNEY DISEASE WITH STAGE 5 CHRONIC KIDNEY DISEASE OR END STAGE RENAL DISEASE: ICD-10-CM

## 2017-12-26 DIAGNOSIS — I72.8 ANEURYSM OF OTHER SPECIFIED ARTERIES: ICD-10-CM

## 2017-12-26 DIAGNOSIS — E83.89 OTHER DISORDERS OF MINERAL METABOLISM: ICD-10-CM

## 2017-12-26 DIAGNOSIS — D63.1 ANEMIA IN CHRONIC KIDNEY DISEASE: ICD-10-CM

## 2017-12-26 DIAGNOSIS — R79.1 ABNORMAL COAGULATION PROFILE: ICD-10-CM

## 2017-12-26 DIAGNOSIS — T82.898A OTHER SPECIFIED COMPLICATION OF VASCULAR PROSTHETIC DEVICES, IMPLANTS AND GRAFTS, INITIAL ENCOUNTER: ICD-10-CM

## 2017-12-26 DIAGNOSIS — Z79.891 LONG TERM (CURRENT) USE OF OPIATE ANALGESIC: ICD-10-CM

## 2017-12-26 DIAGNOSIS — Z99.2 DEPENDENCE ON RENAL DIALYSIS: ICD-10-CM

## 2017-12-26 DIAGNOSIS — G56.10 OTHER LESIONS OF MEDIAN NERVE, UNSPECIFIED UPPER LIMB: ICD-10-CM

## 2017-12-26 DIAGNOSIS — Z88.6 ALLERGY STATUS TO ANALGESIC AGENT: ICD-10-CM

## 2017-12-26 DIAGNOSIS — N18.6 END STAGE RENAL DISEASE: ICD-10-CM

## 2017-12-26 DIAGNOSIS — Z91.013 ALLERGY TO SEAFOOD: ICD-10-CM

## 2017-12-26 DIAGNOSIS — E78.5 HYPERLIPIDEMIA, UNSPECIFIED: ICD-10-CM

## 2017-12-26 DIAGNOSIS — Z88.8 ALLERGY STATUS TO OTHER DRUGS, MEDICAMENTS AND BIOLOGICAL SUBSTANCES STATUS: ICD-10-CM

## 2017-12-26 DIAGNOSIS — R20.0 ANESTHESIA OF SKIN: ICD-10-CM

## 2017-12-27 NOTE — DISCHARGE NOTE ADULT - INSTRUCTIONS
Aortic insufficiency continue care of your fistula surgical wound as instructed and as you had demonstrated

## 2018-01-09 ENCOUNTER — RX RENEWAL (OUTPATIENT)
Age: 38
End: 2018-01-09

## 2018-01-09 RX ORDER — CARVEDILOL 25 MG/1
25 TABLET, FILM COATED ORAL TWICE DAILY
Qty: 180 | Refills: 3 | Status: ACTIVE | COMMUNITY
Start: 2017-01-31 | End: 1900-01-01

## 2018-01-10 ENCOUNTER — RX RENEWAL (OUTPATIENT)
Age: 38
End: 2018-01-10

## 2018-01-10 RX ORDER — MINOXIDIL 10 MG/1
10 TABLET ORAL 3 TIMES DAILY
Qty: 90 | Refills: 3 | Status: ACTIVE | COMMUNITY
Start: 2017-01-31 | End: 1900-01-01

## 2018-01-26 ENCOUNTER — MEDICATION RENEWAL (OUTPATIENT)
Age: 38
End: 2018-01-26

## 2018-01-30 ENCOUNTER — APPOINTMENT (OUTPATIENT)
Dept: VASCULAR SURGERY | Facility: CLINIC | Age: 38
End: 2018-01-30

## 2018-02-04 ENCOUNTER — RX RENEWAL (OUTPATIENT)
Age: 38
End: 2018-02-04

## 2018-02-04 RX ORDER — LOSARTAN POTASSIUM 100 MG/1
100 TABLET, FILM COATED ORAL DAILY
Qty: 30 | Refills: 5 | Status: ACTIVE | COMMUNITY
Start: 2017-01-31 | End: 1900-01-01

## 2018-02-06 ENCOUNTER — MEDICATION RENEWAL (OUTPATIENT)
Age: 38
End: 2018-02-06

## 2018-02-12 NOTE — PATIENT PROFILE ADULT. - BLOOD AVOIDANCE/RESTRICTIONS, PROFILE
Hemorrhoids    Hemorrhoids are swollen and inflamed veins inside the rectum and near the anus. The rectum is the last several inches of the colon. The anus is the passage between the rectum and the outside of the body.  Causes   The veins can become swollen due to increased pressure in them. This is most often caused by:  · Chronic constipation or diarrhea  · Straining when having a bowel movement  · Sitting too long on the toilet  · A low-fiber diet  · Pregnancy  Symptoms   · Bleeding from the rectum (this may be noticeable after bowel movements)  · Lump near the anus  · Itching around the anus  · Pain around the anus  There are different types of hemorrhoids. Depending on the type you have and the severity, you may be able to treat yourself at home. In some cases, a procedure may be the best treatment option. Your healthcare provider can tell you more about this, if needed.  Home care  General care  · To get relief from pain or itching, try:  ¨ Topical products. Your healthcare provider may prescribe or recommend creams, ointments, or pads that can be applied to the hemorrhoid. Use these exactly as directed.  ¨ Medicines. Your healthcare provider may recommend stool softeners, suppositories, or laxatives to help manage constipation. Use these exactly as directed.  ¨ Sitz baths. A sitz bath involves sitting in a few inches of warm bath water. Be careful not to make the water so hot that you burn yourself--test it before sitting in it. Soak for about 10 to 15 minutes a few times a day. This may help relieve pain.  Tips to help prevent hemorrhoids  · Eat more fiber. Fiber adds bulk to stool and absorbs water as it moves through your colon. This makes stool softer and easier to pass.  ¨ Increase the fiber in your diet with more fiber-rich foods. These include fresh fruit, vegetables, and whole grains.  ¨ Take a fiber supplement or bulking agent, if advised to by your provider. These include products such as psyllium  or methylcellulose.  · Drink plenty of water, if directed to by your provider. This can help keep stool soft.  · Be more active. Frequent exercise aids digestion and helps prevent constipation. It may also help make bowel movements more regular.  · Don’t strain during bowel movements. This can make hemorrhoids more likely. Also, don’t sit on the toilet for long periods of time.  Follow-up care  Follow up with your healthcare provider, or as advised. If a culture or imaging tests were done, you will be notified of the results when they are ready. This may take a few days or longer.  When to seek medical advice  Call your healthcare provider right away if any of these occur:  · Increased bleeding from the rectum  · Increased pain around the rectum or anus  · Weakness or dizziness   Call 911   Call 911 or return to the emergency department right away if any of these occur:  · Trouble breathing or swallowing  · Fainting or loss of consciousness  · Unusually fast heart rate  · Vomiting blood  · Large amounts of blood in stool     © 3623-2257 [employer] [address] [place] [address], [place] [address]. All rights reserved. This information is not intended as a substitute for professional medical care. Always follow your healthcare professional's instructions.         none

## 2018-03-06 ENCOUNTER — MEDICATION RENEWAL (OUTPATIENT)
Age: 38
End: 2018-03-06

## 2018-03-06 RX ORDER — SEVELAMER CARBONATE 800 MG/1
800 TABLET, FILM COATED ORAL 3 TIMES DAILY
Qty: 540 | Refills: 3 | Status: ACTIVE | COMMUNITY
Start: 1900-01-01 | End: 1900-01-01

## 2018-06-12 NOTE — PROGRESS NOTE ADULT - PROBLEM SELECTOR PROBLEM 2
1.  ARMD OU Advanced with subfoveal involvement/dry/stable. Importance of daily AREDS II study multivitamin and Amsler Grid checks discussed with patient. Patient to follow-up immediately with any new onset of decreased vision and/or metamorphopsia. 2. Glaucoma Suspect OU (0.8/0.75): Stable IOP and cupping OU. Past w/u (-). Patient is considered Low Risk. 3.  SHILA w/ PEK OU -- Recommend increase the use of OTC ATs TID-QID OU . 4. Anterior Blepharitis type OU- Continue daily warm compresses and lid scrubs were recommended. 5. Dermatochalasis OU UL's- Follow with no intervention at this time. 6. GR I Hypertensive Retinopathy OU- Stable continue HTN Control7. PVD OU- Old stable. RD precautions. 8.  Pseudophakia OU (Toric OU) -- H/o YAG Cap OU. Return for an appointment in 6 MO for a [facility] / [place] with Dr. Damir Roper.
Fistula, arteriovenous, acquired
Fistula, arteriovenous, acquired

## 2018-09-05 NOTE — ED ADULT TRIAGE NOTE - NS ED NOTE AC HIGH RISK COUNTRIES
1. AR  Handouts on allergies and avoidance measures provided and reviewed including the potential treatment option of immunotherapy  Trial of Zyrtec, cetirizine 10 mg once a night at bedtime as needed  Add Flonase or Nasacort 2 sprays per nostril once a da No

## 2018-10-10 NOTE — PROGRESS NOTE ADULT - PROBLEM SELECTOR PROBLEM 3
Dialysis AV fistula malfunction
10-Oct-2018 18:34

## 2019-04-02 NOTE — PATIENT PROFILE ADULT. - NS TRANSFER DISPOSITION PATIENT BELONGINGS
with patient [Normal] : General appearance: No acute distress, well appearing and well nourished [Fully active, able to carry on all pre-disease performance without restriction] : Status 0 - Fully active, able to carry on all pre-disease performance without restriction

## 2019-07-24 NOTE — PROGRESS NOTE ADULT - PROBLEM SELECTOR PROBLEM 5
Add 14593 Cpt? (Important Note: In 2017 The Use Of 99360 Is Being Tracked By Cms To Determine Future Global Period Reimbursement For Global Periods): yes
Detail Level: Detailed
Chronic kidney disease-mineral and bone disorder
Chronic kidney disease-mineral and bone disorder

## 2019-07-25 NOTE — PROGRESS NOTE ADULT - SUBJECTIVE AND OBJECTIVE BOX
Dialyzed yesterday without bleeding afterwards. Noted to be hypotensive with SBP 80s on Cardene gtt overnight, gtt and PO anti-hypertensives held with improvement in BP. Pt remained asymptomatic. He continues to complain of L chest wall pain but is otherwise without complaints.     aspirin  chewable 81  carvedilol 50  cloNIDine 0.1  doxazosin 2  heparin  Injectable 5000  isosorbide   mononitrate ER Tablet (IMDUR) 60  losartan 100      Allergies    morphine (Other)  nitroglycerin (Anaphylaxis)  shellfish (Anaphylaxis)    Intolerances        Vital Signs Last 24 Hrs  T(C): 36.7 (28 Nov 2017 09:00), Max: 37.6 (27 Nov 2017 22:25)  T(F): 98 (28 Nov 2017 09:00), Max: 99.6 (27 Nov 2017 22:25)  HR: 64 (28 Nov 2017 11:26) (62 - 104)  BP: 126/71 (28 Nov 2017 11:26) (84/54 - 185/99)  BP(mean): 102 (28 Nov 2017 11:26) (62 - 136)  RR: 21 (28 Nov 2017 11:26) (13 - 57)  SpO2: 98% (28 Nov 2017 11:26) (96% - 100%)  I&O's Summary    27 Nov 2017 07:01  -  28 Nov 2017 07:00  --------------------------------------------------------  IN: 902.7 mL / OUT: 4000 mL / NET: -3097.3 mL    28 Nov 2017 07:01  -  28 Nov 2017 13:43  --------------------------------------------------------  IN: 250 mL / OUT: 50 mL / NET: 200 mL        Physical Exam:  General: NAD, alert, interactive, comfortable  Pulmonary: non-labored breathing on room air   Cardiovascular: NSR on telemetry   Abdominal: soft, non-distended, tender over L lower ribs   Extremities: warm, well perfused, LUE AVF with palpable thrill, 1+ L radial pulse, 2+ R radial pulse      LABS:                        8.3    3.3   )-----------( 79       ( 28 Nov 2017 04:44 )             25.7     11-28    138  |  94<L>  |  38<H>  ----------------------------<  89  4.8   |  26  |  7.80<H>    Ca    9.2      28 Nov 2017 04:44  Phos  7.9     11-28  Mg     2.2     11-28    TPro  8.0  /  Alb  4.7  /  TBili  1.0  /  DBili  x   /  AST  15  /  ALT  9<L>  /  AlkPhos  48  11-27    PT/INR - ( 27 Nov 2017 08:10 )   PT: 12.7 sec;   INR: 1.14          PTT - ( 27 Nov 2017 08:10 )  PTT:48.6 sec    Radiology and Additional Studies: n/a Procedure completed pt tolerated well site and dressing look good

## 2020-01-09 NOTE — PATIENT PROFILE ADULT. - FUNCTIONAL SCREEN CURRENT LEVEL: SWALLOWING (IF SCORE 2 OR MORE FOR ANY ITEM, CONSULT REHAB SERVICES), MLM)
1/9/2020:  Prior to surgery, H&P dated 12/17/2020 reviewed along with patient's physical exam.  No changes are noted.  Blood pressure 101/71, pulse 65, resp. rate 16, height 5' 2\" (1.575 m), weight 61.2 kg, last menstrual period 11/24/2019, SpO2 97 %, not currently breastfeeding.  Proceed as planned with diagnostic cerebral angiogram with possible arterial closure device.  Risks and benefits discussed with patient/family.  Consent signed and in chart.    
(0) swallows foods/liquids without difficulty

## 2020-03-11 NOTE — ED PROVIDER NOTE - CHIEF COMPLAINT
The patient is a 37y Male complaining of complication, access device.
Detail Level: Detailed
Quality 130: Documentation Of Current Medications In The Medical Record: Current Medications Documented
Quality 402: Tobacco Use And Help With Quitting Among Adolescents: Patient screened for tobacco and is an ex-smoker

## 2020-07-14 NOTE — PRE-OP CHECKLIST - ASSESSMENT, HISTORY & PHYSICAL COMPLETED AND ON MEDICAL RECORD
-- DO NOT REPLY / DO NOT REPLY ALL --  -- Message is from the [facility]--    COVID-19 Universal Screening: Positive    Transfer to RN      Chief Complaint:  Patient is wanting to speak with nurse she has symptoms of diarrhea shortness of breath and migraine (headaches) when advised patient I had to list her as positive she got very upset she stated her symptoms are from a diease she has and wants to speak with a nurse (she also stated previous reps didn't list her as positive with these symptoms)    Caller Information       Type Contact Phone    07/14/2020 05:53 PM Phone (Incoming) Radha Hatch (Self) [phone] (H)          Provider Name: Lily RENTERIA Practice Site Name:  [facility]    Alternative Phone Number:       done

## 2020-10-07 NOTE — CONSULT NOTE ADULT - PROBLEM SELECTOR RECOMMENDATION 6
Statement Selected
Unclear etiology  Consider sonographic imaging for the abdominal aorta.   No precise renal related etiologies can explain this pain right now.

## 2022-05-11 NOTE — DISCHARGE NOTE ADULT - ADMISSION DATE +STARTOFVISITDATE
[FreeTextEntry1] : #1  Consider small cyst-like lesion palpable left anterior shoulder region.  By hiistory, this has decreased in size over the last 3 weeks.  It this does persist for another montth, the patient will return for repeat examination.  He knows that if this becomes infected or increases in size that it should be removed.
[FreeTextEntry1] : #1  Consider small cyst-like lesion palpable left anterior shoulder region.  By hiistory, this has decreased in size over the last 3 weeks.  It this does persist for another montth, the patient will return for repeat examination.  He knows that if this becomes infected or increases in size that it should be removed.
Statement Selected

## 2022-09-21 NOTE — DISCHARGE NOTE ADULT - MEDICATION SUMMARY - MEDICATIONS TO TAKE
POST-OP DIAGNOSIS:  Elevated PSA 21-Sep-2022 13:37:50  Rajiv Ledesma  
I will START or STAY ON the medications listed below when I get home from the hospital:    aspirin 81 mg oral delayed release tablet  -- 1 tab(s) by mouth once a day  -- Indication: For Circulation    Percocet 5/325 oral tablet  -- 1 tab(s) by mouth every 6 hours, As Needed MDD:4 tabs  -- Caution federal law prohibits the transfer of this drug to any person other  than the person for whom it was prescribed.  May cause drowsiness.  Alcohol may intensify this effect.  Use care when operating dangerous machinery.  This prescription cannot be refilled.  This product contains acetaminophen.  Do not use  with any other product containing acetaminophen to prevent possible liver damage.  Using more of this medication than prescribed may cause serious breathing problems.    -- Indication: For Pain    losartan 50 mg oral tablet  -- 2 tab(s) by mouth once a day  -- Do not take this drug if you are pregnant.  It is very important that you take or use this exactly as directed.  Do not skip doses or discontinue unless directed by your doctor.  Some non-prescription drugs may aggravate your condition.  Read all labels carefully.  If a warning appears, check with your doctor before taking.    -- Indication: For HTN    Catapres 0.3 mg oral tablet  -- 1 tab(s) by mouth 3 times a day  -- It is very important that you take or use this exactly as directed.  Do not skip doses or discontinue unless directed by your doctor.  May cause drowsiness.  Alcohol may intensify this effect.  Use care when operating dangerous machinery.  Some non-prescription drugs may aggravate your condition.  Read all labels carefully.  If a warning appears, check with your doctor before taking.    -- Indication: For HTN    doxazosin 2 mg oral tablet  -- 1 tab(s) by mouth once a day (at bedtime)  -- Indication: For HTN    isosorbide mononitrate 60 mg oral tablet, extended release  -- 1 tab(s) by mouth once a day  -- Indication: For HTN    gabapentin 300 mg oral capsule  -- 1 cap(s) by mouth once a day  -- It is very important that you take or use this exactly as directed.  Do not skip doses or discontinue unless directed by your doctor.  May cause drowsiness.  Alcohol may intensify this effect.  Use care when operating dangerous machinery.    -- Indication: For Neuropathic pain    diphenhydrAMINE 50 mg oral capsule  -- 1 cap(s) by mouth once a day (at bedtime), As needed, Insomnia  -- Indication: For Insomnia    atorvastatin 10 mg oral tablet  -- 1 tab(s) by mouth once a day (at bedtime)  -- Indication: For HLD    carvedilol 25 mg oral tablet  -- 2 tab(s) by mouth every 12 hours  -- Indication: For HTN    amLODIPine 10 mg oral tablet  -- 1 tab(s) by mouth once a day  -- Indication: For HTN    famotidine 20 mg oral tablet  -- 1 tab(s) by mouth once a day  -- Indication: For GERD    docusate sodium 100 mg oral capsule  -- 1 cap(s) by mouth once a day  -- Indication: For Constipation    polyethylene glycol 3350 oral powder for reconstitution  -- 17 gram(s) by mouth once a day, As needed, Constipation  -- Indication: For Constipation    senna oral tablet  -- 2 tab(s) by mouth once a day (at bedtime)  -- Indication: For Constipation    Renagel 800 mg oral tablet  -- 2 tab(s) by mouth 3 times a day  -- Indication: For ESRD (end stage renal disease) on dialysis    minoxidil 10 mg oral tablet  -- 1 tab(s) by mouth 3 times a day  -- Indication: For HTN    hydrALAZINE 25 mg oral tablet  -- 4 tab(s) by mouth every 8 hours  -- It is very important that you take or use this exactly as directed.  Do not skip doses or discontinue unless directed by your doctor.  Some non-prescription drugs may aggravate your condition.  Read all labels carefully.  If a warning appears, check with your doctor before taking.    -- Indication: For HTN

## 2022-12-02 NOTE — ED PROVIDER NOTE - NS_EDPROVIDERDISPOUSERTYPE_ED_A_ED
Plan Long discussion regarding patient's situation (HIPAA compliant) with Katharine Murray rep at [phone] as well as  for Trevor Thapa at [phone].  They both mention that because the patient has been over a year without infusion , it might be a good idea to get a CD19 test to see if the B cells have been repleted.  Also they said that if the  B cells are repleted it might be an option to give the patient 300 mg Ocrevus in the first infusion and then 2 weeks later the next 300 mg.  Patient's MRIs have been scheduled for next week and we will be setting up the infusion for infusions of the Ocrevus after that   Attending Attestation (For Attendings USE Only)...

## 2023-07-26 NOTE — ED PROVIDER NOTE - CPE EDP ENMT NORM
2023      RE: Brady Bower  [address]  [place] [address]     Dear Colleague,    Thank you for the opportunity to participate in the care of your patient, Brady Bower, at the [facility] at [facility]. Please see a copy of my visit note below.    Carolina Wellington MD  [facility]  [address] [place] [address]    RE:      Brady Bower  MRN:  [id number]  :   2023    Dear Dr. Wellington:    It was my pleasure seeing Brady Bower in clinic today regarding a perirectal abscess.  Brady is also noted to have bilateral hydroceles.  He is, according to the 40 week and 1 day gestational age infant, who has a perirectal fistula, evident on examination.  This has been persistent for some time.  I discussed with his mother the procedure of laying open the fistula, possible Seton placement.  He does also have bilateral hydroceles, which I think may be just physiologic hydroceles and may not require surgical correction at this point, given his early age.  So I recommended to her that he undergo examination under anesthesia and perirectal fistulotomy, which we are going to schedule electively in the near future.    Thank you very much for allowing us to be involved in Brady's care.  Please contact me if I can be of further assistance.    Sincerely,        Adolfo Albarran MD  
normal...

## 2023-10-30 NOTE — PATIENT PROFILE ADULT. - MEDICATION HERBAL REMEDIES, PROFILE
Increase Miralax 17 twice daily in 8 ounces of water. Milk of Magnesia 2 tablespoons every 3rd day   no

## 2023-11-03 NOTE — ED ADULT TRIAGE NOTE - LOCATION:
**Preliminary Note - patient to be seen**    [facility] Division of Hospital Medicine  Maty William MD      HPI:  58F w/ severe obesity, HTN, type 2 diabetes, asthma, NAKUL (not using CPAP), chronic hypercapneic respiratory failure 2/2 obesity, restrictive lung disease, and kyphosis (?nocturnal bipap ?), left RCC now POD#0 s/p left laparoscopic radical nephrectomy.    Seen and examined post-operatively. She reports ***.    Allergies  mushroom (Unknown)  venlafaxine (Hives)  gadobutrol (Rash; Urticaria; Hives)  Fioricet (Rash)  IV Contrast (Short breath)      HOME MEDICATIONS: Reviewed    MEDICATIONS  (STANDING):  lactated ringers. 1000 milliLiter(s) (30 mL/Hr) IV Continuous <Continuous>    MEDICATIONS  (PRN):      PAST MEDICAL & SURGICAL HISTORY:  Hypertension  vaginal cyst  Hyperlipidemia  Asthma  last inhaler use with in 10 days, on Pulm follow up [id number]  Hypothyroid  Obstructive sleep apnea  refuses Cpap  Obesity  morbid  Trigeminal neuralgia R  Fibromyalgia  DM (diabetes mellitus)  DJD (degenerative joint disease)  Cervical/Thoracic/Lumbar  Cervical neuralgia  difficulty moving my neck  Back pain  thoracic & lumbar  H/O bursitis  right shoulder  Radicular pain  arms, legs  Vasculitis  Legs, forerhead, arms & hands, flare ups in R leg  Dr susie Ribeiro is my Rheumatologist  Renal cell carcinoma, left  on follow up Dr schulte, HOD renal [facility], waiting for suregry in 2020  Falls frequently  Morbid obesity with body mass index (BMI) of 50.0 to 59.9 in adult  Psoriasis  Diverticulosis  Fatty liver  S/P abdominal hysterectomy    S/P  section  1  Left adrenal mass  excision, benign   Vaginal cyst  removed   Vasculitis on nerve biopsy  , had another surgery called microvascular decompression   S/P left oophorectomy  S/P colonoscopic polypectomy      SOCIAL HISTORY:  ***    FAMILY HISTORY:  Family history of diabetes mellitus  Family history of hypertension  Family history of obesity        REVIEW OF SYSTEMS:  CONSTITUTIONAL: No fever, weight loss, or fatigue  RESPIRATORY: No cough, wheezing, chills or hemoptysis; No shortness of breath  CARDIOVASCULAR: No chest pain, palpitations, dizziness, or leg swelling  GASTROINTESTINAL: No abdominal or epigastric pain. No nausea, vomiting, or hematemesis; No diarrhea or constipation. No melena or hematochezia.  NEUROLOGICAL: No headaches, memory loss, loss of strength, numbness, or tremors  SKIN: No itching, burning, rashes, or lesions     Vital Signs Last 24 Hrs  T(C): 37.2 (2023 10:31), Max: 37.2 (2023 10:31)  T(F): 99 (2023 10:31), Max: 99 (2023 10:31)  HR: 96 (2023 10:31) (96 - 96)  BP: 133/81 (2023 10:31) (133/81 - 133/81)  BP(mean): --  RR: 14 (2023 10:31) (14 - 14)  SpO2: 98% (2023 10:31) (98% - 98%)      CAPILLARY BLOOD GLUCOSE  POCT Blood Glucose.: 174 mg/dL (2023 10:35)      PHYSICAL EXAM:    CONSTITUTIONAL: NAD,  EYES: PERRLA; conjunctiva and sclera clear  ENMT: Moist oral mucosa, no pharyngeal injection or exudates;  RESPIRATORY: Normal respiratory effort; lungs are clear to auscultation bilaterally  CARDIOVASCULAR: Regular rate and rhythm, normal S1 and S2, no murmur/rub/gallop; No lower extremity edema  ABDOMEN: Nontender to palpation, normoactive bowel sounds, no rebound/guarding  PSYCH: A+O to person, place, and time; affect appropriate  NEUROLOGY: CN 2-12 are intact and symmetric; no gross sensory deficits   SKIN: No rashes; no palpable lesions    LABS:    Pre-operative labs reviewed notable for:  CBC wnl  BMP CO2 32  A1c 10.4%      POCT Blood Glucose.: 174 mg/dL (2023 10:35)      RADIOLOGY & ADDITIONAL STUDIES:    Imaging:   Personally Reviewed:  [x] YES             MRI 2023 w/ Left renal mass consistent with renal cell carcinoma demonstrating continued mild interval growth.    EKG:   Personally Reviewed:  [x] YES    EKG 2023 w/ ST () otherwise wnl    Care Discussed with Consultant(s)/Other Providers:  Care Discussed with Primary Team.    I reviewed recent pulmonology and cardiology notes detailing her history and management as delineated in A/P.   Left arm; Right arm;
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
## 2024-02-22 NOTE — ED PROVIDER NOTE - CADM POA URETHRAL CATHETER
Dear Georgie King,     It was our pleasure to care for you here at [facility].  It is our hope that we were always able to exceed the expected standards for your care during your stay.  You were hospitalized due to ischemic right great toe.  You were cared for on the 2nd floor by Oren Stevens,  with the [facility] who covers for your primary care physician (PCP), No primary care provider on file., while you were hospitalized.  If you have any questions or concerns related to this hospitalization, you may contact us at .  For follow up as well as any medication refills, we recommend that you follow up with your primary care physician.  A registered nurse will reach out to you by phone within a few days after your discharge to answer any additional questions that you may have after going home.  However, at this time we provide for you here, the most important instructions / recommendations at discharge:     Notable Medication Adjustments -   Plavix 75 mg - blood thinner  Xarelto - Blood thinner  Amaryl - Medication for diabetes  Metformin - Medication for diabetes  Testing Required after Discharge -   Call to schedule PCP follow up  Pelvic Ultrasound  Important follow up information -   Vascular follow up to determine timing of toe amputation  PCP follow up- Call info link to set up  Podiatry    Other Instructions -   You have been discharged on Xarelto which is a blood thinner  -If you have any unexplained bleed, worst headache of your life, strike your head or any other body part in any manner, you need to go the Emergency room.   Please do not participate in any high risk or contact sports while you are on a blood thinner.   Please review this entire after visit summary as additional general instructions including medication list, appointments, activity, diet, any pertinent wound care, and other additional recommendations from your  care team that may be provided for you.      Sincerely,     Oren Stevens,  and Nurse Sudha Lynch                                          DISCHARGE INSTRUCTIONS  ENDOVASCULAR REPAIR  THROMBECTOMY ANGIO AND STENTING    ACTIVITY:  Limit your activity to walking for the first week after surgery. Avoid heavy lifting (no more than 15 lbs) for 2 weeks after surgery. Walking up steps and normal activities may be resumed as you feel ready.   You should not drive a car for at least 1 week following discharge from the hospital and you are off all narcotic pain medications. You may ride in a car.   You may notice a mild back or left flank pain after surgery.  If this becomes severe or does not improve, please call the office.  If you have any questions regarding a particular activity, please discuss with your doctor or nurse before you are discharged.    DIET:  Resume your normal diet.  Drink more water than usual for the next 24 hours.    PROCEDURE SITE: You have a procedure site in each groin.  You have surgical glue at your procedure sites.  The glue is used to cover the procedure site, assist in closure, and prevent contamination. This adhesive will darken and peel away on its own within one to two weeks. Do not pick at it.    You should shower daily.  Wash incision daily with soap and water, but do not rub or scrub the incision; rinse thoroughly and pat dry.  Do not bathe in a tub or swim for the first 2 week following surgery or if you have any open wounds.  It is normal to have some bruising, swelling or discoloration around the procedure site/incision.  IF increasing redness, pain, or a bulge develops, call our office immediately.  If present, you may remove the band-aid or “steri-strips” over your procedure site/incision after two days.   If you notice any active bleeding at the site, apply pressure to the site and call our office ([phone]) or [phone].    FOLLOW UP STUDIES:  Doppler ultrasound studies and cat  scans are very important to your post-operative care.  Your surgeon will arrange for them at your first postoperative visit. You will most likely get a cat scan at 1 month and an ultrasound at 3 months following surgery.    FOLLOW UP APPOINTMENTS:  Making and keeping follow up appointments and ultrasound tests are important to your recovery.  If you have difficulty making it to or keeping your follow up appointments, call the office.    If you have increased pain, fever >101.5, increased drainage, redness or a bad smell at your surgery site, new coldness/numbness of your arm or leg, please call us immediately and GO directly to the ER.    PLEASE CALL THE OFFICE IF YOU HAVE ANY QUESTIONS  [phone]  [phone] [address], [address], [place] [address]  [address] [facility], [address], [place] [address]  [address] [place] [address]  [address] [facility], [facility] [address], [place] [address]  [address] [facility], [place] [address]  [address] [facility], [address]nd Floor, [address], [place] [address]  [address] [facility], [address], [place] [address]  [address] [facility], 1st Floor, [address], [place] [address]  [address] [facility] [place] [address]  [address] [address]    No not applicable
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
## 2025-05-12 NOTE — ED ADULT TRIAGE NOTE - ESI TRIAGE ACUITY LEVEL, MLM
- Perform stretching exercises, see handout for details, to address tightness of calf muscles.      - Recommend wearing supportive shoes only.  Avoid barefoot walking, flip flops, and Crocs, as this will exacerbate current symptoms.      - Recommend icing the affected area a minimum of 20 minutes daily.    - Avoid high impact activities such as squatting, stooping, and running as these activities will exacerbate symptoms.      - May consider applying a topical analgesic (Voltaren cream) to help with pain symptoms.      3